# Patient Record
Sex: FEMALE | Race: WHITE | NOT HISPANIC OR LATINO | Employment: FULL TIME | URBAN - METROPOLITAN AREA
[De-identification: names, ages, dates, MRNs, and addresses within clinical notes are randomized per-mention and may not be internally consistent; named-entity substitution may affect disease eponyms.]

---

## 2018-12-19 ENCOUNTER — OFFICE VISIT (OUTPATIENT)
Dept: OBGYN CLINIC | Facility: CLINIC | Age: 23
End: 2018-12-19
Payer: COMMERCIAL

## 2018-12-19 VITALS
HEIGHT: 67 IN | SYSTOLIC BLOOD PRESSURE: 110 MMHG | BODY MASS INDEX: 29.03 KG/M2 | DIASTOLIC BLOOD PRESSURE: 78 MMHG | WEIGHT: 185 LBS

## 2018-12-19 DIAGNOSIS — Z01.419 ENCOUNTER FOR GYNECOLOGICAL EXAMINATION (GENERAL) (ROUTINE) WITHOUT ABNORMAL FINDINGS: ICD-10-CM

## 2018-12-19 DIAGNOSIS — R10.2 PELVIC CRAMPING: ICD-10-CM

## 2018-12-19 DIAGNOSIS — Z01.419 ENCNTR FOR GYN EXAM (GENERAL) (ROUTINE) W/O ABN FINDINGS: ICD-10-CM

## 2018-12-19 PROCEDURE — 99385 PREV VISIT NEW AGE 18-39: CPT | Performed by: PHYSICIAN ASSISTANT

## 2018-12-19 PROCEDURE — G0145 SCR C/V CYTO,THINLAYER,RESCR: HCPCS | Performed by: PHYSICIAN ASSISTANT

## 2018-12-19 PROCEDURE — 87591 N.GONORRHOEAE DNA AMP PROB: CPT | Performed by: PHYSICIAN ASSISTANT

## 2018-12-19 PROCEDURE — 87491 CHLMYD TRACH DNA AMP PROBE: CPT | Performed by: PHYSICIAN ASSISTANT

## 2018-12-19 NOTE — PROGRESS NOTES
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 boyfriend teaches 6th grade  Last Pap 2017 neg (results unavailable for review)    Current Outpatient Prescriptions:     levonorgestrel (MIRENA) 20 MCG/24HR IUD, 1 each by Intrauterine route once, Disp: , Rfl:   Social History     Social History    Marital status: Single     Spouse name: N/A    Number of children: N/A    Years of education: N/A     Occupational History    Not on file  Social History Main Topics    Smoking status: Never Smoker    Smokeless tobacco: Never Used    Alcohol use Yes      Comment: socially     Drug use: No    Sexual activity: Yes     Partners: Male     Birth control/ protection: IUD      Comment: [email or web address]     Other Topics Concern    Not on file     Social History Narrative    No narrative on file     Family History   Problem Relation Age of Onset    No Known Problems Mother     No Known Problems Father      History reviewed  No pertinent past medical history  O:  Blood pressure 110/78, height 5' 7" (1 702 m), weight 83 9 kg (185 lb), last menstrual period 11/09/2018  Patient appears well and is not in distress  Neck is supple without masses  Breasts are symmetrical without mass, tenderness, nipple discharge, skin changes or adenopathy  Abdomen is soft and nontender without masses  External genitals are normal without lesions or rashes  Vagina is normal without discharge or bleeding  Cervix is normal without discharge or lesion   IUD strings are normal    Uterus is normal, mobile, nontender without palpable mass  Adnexa are normal, nontender, without palpable mass  A:  Yearly exam  Pelvic cramping  P:   Pap with reflex HPV today   Check pelvic ultrasound   Consider switch to [facility] if US negative   RTO one year for yearly exam or sooner as needed

## 2018-12-21 LAB
C TRACH DNA SPEC QL NAA+PROBE: NEGATIVE
N GONORRHOEA DNA SPEC QL NAA+PROBE: NEGATIVE

## 2018-12-24 LAB
LAB AP GYN PRIMARY INTERPRETATION: NORMAL
Lab: NORMAL

## 2018-12-26 ENCOUNTER — TELEPHONE (OUTPATIENT)
Dept: OBGYN CLINIC | Facility: CLINIC | Age: 23
End: 2018-12-26

## 2018-12-26 NOTE — TELEPHONE ENCOUNTER
Pt is interested in [facility] iud, please check iud coverage after Jan,2019    Thank you

## 2019-01-08 ENCOUNTER — TELEPHONE (OUTPATIENT)
Dept: OBGYN CLINIC | Facility: CLINIC | Age: 24
End: 2019-01-08

## 2019-01-08 NOTE — TELEPHONE ENCOUNTER
Ref # [id number]  Patient covered at 100% no deductible  Buy and Bill  Patient would like a [place] Check

## 2019-01-09 NOTE — TELEPHONE ENCOUNTER
Ghassan and Bujbu labeled and will be in [facility] med room  Pt may be scheduled for insertion

## 2019-01-21 NOTE — TELEPHONE ENCOUNTER
Pt called to state she did receive 2 msgs to make her Kyleena appt but will call back when she is ready for this

## 2019-02-19 ENCOUNTER — OFFICE VISIT (OUTPATIENT)
Dept: OBGYN CLINIC | Facility: CLINIC | Age: 24
End: 2019-02-19
Payer: COMMERCIAL

## 2019-02-19 VITALS — WEIGHT: 182 LBS | DIASTOLIC BLOOD PRESSURE: 64 MMHG | SYSTOLIC BLOOD PRESSURE: 100 MMHG | BODY MASS INDEX: 28.51 KG/M2

## 2019-02-19 DIAGNOSIS — N61.1 BREAST ABSCESS: Primary | ICD-10-CM

## 2019-02-19 PROCEDURE — 99213 OFFICE O/P EST LOW 20 MIN: CPT | Performed by: PHYSICIAN ASSISTANT

## 2019-02-19 RX ORDER — SULFAMETHOXAZOLE AND TRIMETHOPRIM 800; 160 MG/1; MG/1
1 TABLET ORAL EVERY 12 HOURS SCHEDULED
Qty: 14 TABLET | Refills: 0 | Status: SHIPPED | OUTPATIENT
Start: 2019-02-19 | End: 2019-02-26

## 2019-02-19 NOTE — PROGRESS NOTES
Matthew Alfaro  1995    S:  20 y o  female here for a problem visit  She notes an "abscess" under her left breast  She denies fevers or chills and it has not drained  She says she had a similar problem in 2016 and required surgical removal of the area; this was in an adjacent area  She says she asked the surgeon back then what she could do to prevent it, and she was told she could have a breast reduction  History reviewed  No pertinent past medical history    Family History   Problem Relation Age of Onset    No Known Problems Mother     No Known Problems Father      Social History     Socioeconomic History    Marital status: Single     Spouse name: None    Number of children: None    Years of education: None    Highest education level: None   Occupational History    None   Social Needs    Financial resource strain: None    Food insecurity:     Worry: None     Inability: None    Transportation needs:     Medical: None     Non-medical: None   Tobacco Use    Smoking status: Never Smoker    Smokeless tobacco: Never Used   Substance and Sexual Activity    Alcohol use: Yes     Comment: socially     Drug use: No    Sexual activity: Yes     Partners: Male     Birth control/protection: IUD     Comment: [id number]   Lifestyle    Physical activity:     Days per week: None     Minutes per session: None    Stress: None   Relationships    Social connections:     Talks on phone: None     Gets together: None     Attends Muslim service: None     Active member of club or organization: None     Attends meetings of clubs or organizations: None     Relationship status: None    Intimate partner violence:     Fear of current or ex partner: None     Emotionally abused: None     Physically abused: None     Forced sexual activity: None   Other Topics Concern    None   Social History Narrative    None       O:  /64 (BP Location: Right arm, Patient Position: Sitting, Cuff Size: Standard)   Wt 82 6 kg (182 lb)   BMI 28 51 kg/m²   She appears well and is in no distress  Abdomen is soft and nontender  The left breast has an abscess on the underside; there is a 9cm area of erythema and a 2cm area of mild consolidation; there is no drainage  Bilateral axillary areas have areas suspicious for hidradenitis; there are numerous areas under her breasts that appear to have healed hidradenitis change  A/P:  Breast abscess  Bactrim DS one po BID x 7 days  Return in one week for recheck  Referral to dermatology to evaluate for hidradenitis

## 2019-02-26 ENCOUNTER — OFFICE VISIT (OUTPATIENT)
Dept: OBGYN CLINIC | Facility: CLINIC | Age: 24
End: 2019-02-26

## 2019-02-26 VITALS — DIASTOLIC BLOOD PRESSURE: 72 MMHG | BODY MASS INDEX: 28.66 KG/M2 | SYSTOLIC BLOOD PRESSURE: 120 MMHG | WEIGHT: 183 LBS

## 2019-02-26 DIAGNOSIS — N61.1 BREAST ABSCESS: Primary | ICD-10-CM

## 2019-02-26 PROCEDURE — 99213 OFFICE O/P EST LOW 20 MIN: CPT | Performed by: PHYSICIAN ASSISTANT

## 2019-03-05 ENCOUNTER — TELEPHONE (OUTPATIENT)
Dept: OBGYN CLINIC | Facility: CLINIC | Age: 24
End: 2019-03-05

## 2019-03-05 NOTE — TELEPHONE ENCOUNTER
Pt called office today, left vm message  Pt states she is still having issues with breast "cysts"  Attempted to return Pt's call @ [phone], however, Pt's answering service states Pt's vm mailbox is full

## 2019-03-07 ENCOUNTER — OFFICE VISIT (OUTPATIENT)
Dept: OBGYN CLINIC | Facility: CLINIC | Age: 24
End: 2019-03-07
Payer: COMMERCIAL

## 2019-03-07 VITALS — DIASTOLIC BLOOD PRESSURE: 80 MMHG | SYSTOLIC BLOOD PRESSURE: 120 MMHG | WEIGHT: 181 LBS | BODY MASS INDEX: 28.35 KG/M2

## 2019-03-07 DIAGNOSIS — N61.1 BREAST ABSCESS: Primary | ICD-10-CM

## 2019-03-07 PROCEDURE — 99213 OFFICE O/P EST LOW 20 MIN: CPT | Performed by: PHYSICIAN ASSISTANT

## 2019-03-07 RX ORDER — SULFAMETHOXAZOLE AND TRIMETHOPRIM 400; 80 MG/1; MG/1
1 TABLET ORAL EVERY 12 HOURS SCHEDULED
Qty: 14 TABLET | Refills: 0 | Status: SHIPPED | OUTPATIENT
Start: 2019-03-07 | End: 2019-03-14

## 2019-03-07 RX ORDER — DROSPIRENONE AND ETHINYL ESTRADIOL 0.03MG-3MG
1 KIT ORAL DAILY
Qty: 90 TABLET | Refills: 3 | Status: SHIPPED | OUTPATIENT
Start: 2019-03-07 | End: 2021-06-08 | Stop reason: ALTCHOICE

## 2019-03-07 NOTE — PROGRESS NOTES
Sam s  1995    S:  20 y o  female here for a problem visit  She reports that her previously noted left breast abscess has completely resolved and she now has a new one on the right; it is draining  She denies fevers  She has an appointment with Dermatology in May  We discussed my suspicion that this is hidradenitis and maybe her Mirena could be worsening this - we discussed adding a birth control pill to try to control this, and she is interested in this  If she does well in a few months, we will pull her Mirena IUD  History reviewed  No pertinent past medical history    Family History   Problem Relation Age of Onset    No Known Problems Mother     No Known Problems Father      Social History     Socioeconomic History    Marital status: Single     Spouse name: None    Number of children: None    Years of education: None    Highest education level: None   Occupational History    None   Social Needs    Financial resource strain: None    Food insecurity:     Worry: None     Inability: None    Transportation needs:     Medical: None     Non-medical: None   Tobacco Use    Smoking status: Never Smoker    Smokeless tobacco: Never Used   Substance and Sexual Activity    Alcohol use: Yes     Comment: socially     Drug use: No    Sexual activity: Yes     Partners: Male     Birth control/protection: IUD     Comment: [id number]   Lifestyle    Physical activity:     Days per week: None     Minutes per session: None    Stress: None   Relationships    Social connections:     Talks on phone: None     Gets together: None     Attends Anglican service: None     Active member of club or organization: None     Attends meetings of clubs or organizations: None     Relationship status: None    Intimate partner violence:     Fear of current or ex partner: None     Emotionally abused: None     Physically abused: None     Forced sexual activity: None   Other Topics Concern    None   Social History Narrative    None       O:  /80 (BP Location: Right arm, Patient Position: Sitting, Cuff Size: Standard)   Wt 82 1 kg (181 lb)   LMP  (LMP Unknown)   BMI 28 35 kg/m²   She appears well and is in no distress  Abdomen is soft and nontender  There is a 2cm area of erythema under the right breast that is open and draining  There are numerous scarred areas under both breasts which she says have all done the same thing  A/P:  Breast abscess  Bactrim DS one po BID x 7 days  Start La gamaliel  See derm in May, will consider pulling her Mirena if she is doing better on [place]

## 2020-10-05 ENCOUNTER — NURSE TRIAGE (OUTPATIENT)
Dept: OTHER | Facility: OTHER | Age: 25
End: 2020-10-05

## 2020-10-05 DIAGNOSIS — Z20.828 SARS-ASSOCIATED CORONAVIRUS EXPOSURE: ICD-10-CM

## 2020-10-05 DIAGNOSIS — Z20.828 SARS-ASSOCIATED CORONAVIRUS EXPOSURE: Primary | ICD-10-CM

## 2020-10-05 PROCEDURE — U0003 INFECTIOUS AGENT DETECTION BY NUCLEIC ACID (DNA OR RNA); SEVERE ACUTE RESPIRATORY SYNDROME CORONAVIRUS 2 (SARS-COV-2) (CORONAVIRUS DISEASE [COVID-19]), AMPLIFIED PROBE TECHNIQUE, MAKING USE OF HIGH THROUGHPUT TECHNOLOGIES AS DESCRIBED BY CMS-2020-01-R: HCPCS | Performed by: FAMILY MEDICINE

## 2020-10-06 LAB — SARS-COV-2 RNA SPEC QL NAA+PROBE: NOT DETECTED

## 2021-06-08 ENCOUNTER — OFFICE VISIT (OUTPATIENT)
Dept: OBGYN CLINIC | Facility: CLINIC | Age: 26
End: 2021-06-08
Payer: COMMERCIAL

## 2021-06-08 VITALS
WEIGHT: 189.6 LBS | DIASTOLIC BLOOD PRESSURE: 72 MMHG | HEIGHT: 66 IN | SYSTOLIC BLOOD PRESSURE: 102 MMHG | BODY MASS INDEX: 30.47 KG/M2

## 2021-06-08 DIAGNOSIS — Z11.3 SCREEN FOR STD (SEXUALLY TRANSMITTED DISEASE): Primary | ICD-10-CM

## 2021-06-08 DIAGNOSIS — Z01.419 ENCNTR FOR GYN EXAM (GENERAL) (ROUTINE) W/O ABN FINDINGS: ICD-10-CM

## 2021-06-08 PROCEDURE — G0145 SCR C/V CYTO,THINLAYER,RESCR: HCPCS | Performed by: PHYSICIAN ASSISTANT

## 2021-06-08 PROCEDURE — S0612 ANNUAL GYNECOLOGICAL EXAMINA: HCPCS | Performed by: PHYSICIAN ASSISTANT

## 2021-06-08 NOTE — PROGRESS NOTES
Bo Skiff  1995    CC:  Yearly exam    S:  27 y o  female here for yearly exam  She is amenorrheic with her Mirena  She got  and would like her Mirena removed at this time  They would like a pregnancy in 6-12 months  We discussed removing her IUD closer to when they would like a pregnancy  In the meantime she will begin a prenatal vitamin  Sexual activity: She is sexually active with her  (together x 4 years) without pain, bleeding or dryness  Contraception:  She uses Mirena for contraception  STD testing:  She does request STD testing today  Gardasil:  She has not had the Gardasil series  We reviewed ASCCP guidelines for Pap testing  Last Pap 12/19/18 neg    Family hx of breast cancer: no  Family hx of ovarian cancer: no  Family hx of colon cancer: no      Current Outpatient Medications:     levonorgestrel (MIRENA) 20 MCG/24HR IUD, 1 each by Intrauterine route once, Disp: , Rfl:   Social History     Socioeconomic History    Marital status: /Civil Union     Spouse name: Not on file    Number of children: Not on file    Years of education: Not on file    Highest education level: Not on file   Occupational History    Not on file   Social Needs    Financial resource strain: Not on file    Food insecurity     Worry: Not on file     Inability: Not on file   [employer] needs     Medical: Not on file     Non-medical: Not on file   Tobacco Use    Smoking status: Never Smoker    Smokeless tobacco: Never Used   Substance and Sexual Activity    Alcohol use: Yes     Frequency: Never     Drinks per session: 1 or 2     Binge frequency: Never     Comment: socially     Drug use: No    Sexual activity: Yes     Partners: Male     Birth control/protection: I U D       Comment: [id number]   Lifestyle    Physical activity     Days per week: Not on file     Minutes per session: Not on file    Stress: Not on file   Relationships    Social connections Talks on phone: Not on file     Gets together: Not on file     Attends Moravian service: Not on file     Active member of club or organization: Not on file     Attends meetings of clubs or organizations: Not on file     Relationship status: Not on file    Intimate partner violence     Fear of current or ex partner: Not on file     Emotionally abused: Not on file     Physically abused: Not on file     Forced sexual activity: Not on file   Other Topics Concern    Not on file   Social History Narrative    Not on file     Family History   Problem Relation Age of Onset    No Known Problems Mother     No Known Problems Father      History reviewed  No pertinent past medical history  Review of Systems   Respiratory: Negative  Cardiovascular: Negative  Gastrointestinal: Negative for constipation and diarrhea  Genitourinary: Negative for difficulty urinating, pelvic pain, vaginal bleeding, vaginal discharge, itching or odor  O:  Blood pressure 102/72, height 5' 5 75" (1 67 m), weight 86 kg (189 lb 9 6 oz)  Patient appears well and is not in distress  Neck is supple without masses  Breasts are symmetrical without mass, tenderness, nipple discharge, skin changes or adenopathy  Abdomen is soft and nontender without masses  External genitals are normal without lesions or rashes  Urethra and urethral meatus are normal  Bladder is normal to palpation  Vagina is normal without discharge or bleeding  Cervix is normal without discharge or lesion  IUD strings are normal    Uterus is normal, mobile, nontender without palpable mass  Adnexa are normal, nontender, without palpable mass  A:   Yearly exam      P:   Pap with reflex HPV today    GC/chlamydia today    Start prenatal vitamin   Return when desires Mirena removal       RTO one year for yearly exam or sooner as needed

## 2021-06-11 LAB
LAB AP GYN PRIMARY INTERPRETATION: NORMAL
Lab: NORMAL

## 2021-06-15 ENCOUNTER — TELEPHONE (OUTPATIENT)
Dept: OBGYN CLINIC | Facility: CLINIC | Age: 26
End: 2021-06-15

## 2021-06-15 DIAGNOSIS — Z11.3 SCREEN FOR STD (SEXUALLY TRANSMITTED DISEASE): Primary | ICD-10-CM

## 2021-06-15 LAB
C TRACH DNA SPEC QL NAA+PROBE: ABNORMAL
N GONORRHOEA DNA SPEC QL NAA+PROBE: ABNORMAL

## 2021-06-15 NOTE — TELEPHONE ENCOUNTER
Spoke to pt and placed new orders for lab    ----- Message from Samia Earl sent at 6/15/2021 11:30 AM EDT -----  Please let Yandy Keller know that her cultures could not be run  Please send her to lab for urine Gc/chlamydia and remind her to not urinate for one hour before collection  Thanks!

## 2021-06-19 ENCOUNTER — OFFICE VISIT (OUTPATIENT)
Dept: URGENT CARE | Facility: MEDICAL CENTER | Age: 26
End: 2021-06-19
Payer: COMMERCIAL

## 2021-06-19 VITALS
HEART RATE: 80 BPM | RESPIRATION RATE: 16 BRPM | TEMPERATURE: 99 F | DIASTOLIC BLOOD PRESSURE: 67 MMHG | OXYGEN SATURATION: 98 % | SYSTOLIC BLOOD PRESSURE: 122 MMHG

## 2021-06-19 DIAGNOSIS — H00.11 CHALAZION OF RIGHT UPPER EYELID: Primary | ICD-10-CM

## 2021-06-19 PROCEDURE — 99213 OFFICE O/P EST LOW 20 MIN: CPT | Performed by: FAMILY MEDICINE

## 2021-06-19 RX ORDER — TOBRAMYCIN 3 MG/ML
1 SOLUTION/ DROPS OPHTHALMIC
Qty: 1.3 ML | Refills: 0 | Status: SHIPPED | OUTPATIENT
Start: 2021-06-19 | End: 2021-06-24

## 2021-06-19 NOTE — PATIENT INSTRUCTIONS
I prescribed tobramycin eyedrops 1 drop into right eye every 4 hours while awake for up to 5 days  Strongly recommended patient continue warm to hot compresses as tolerated as often as possible  If redness or swelling of upper eyelid worsens, patient was given outpatient ophthalmology referral     Chalazion   WHAT YOU NEED TO KNOW:   A chalazion is a lump on your eyelid  This lump develops because an oil gland in your eyelid is blocked  A chalazion may be small and then slowly grow bigger  DISCHARGE INSTRUCTIONS:   Medicines:   · Antibiotics: This medicine is given to fight or prevent an infection caused by bacteria  Always take your antibiotics exactly as ordered by your healthcare provider  Do not stop taking your medicine unless directed by your healthcare provider  Never save antibiotics or take leftover antibiotics that were given to you for another illness  · NSAIDs:  These medicines decrease swelling, pain, and fever  NSAIDs are available without a doctor's order  Ask which medicine is right for you and how much to take  Take as directed  NSAIDs can cause stomach bleeding or kidney problems if not taken correctly  · Take your medicine as directed  Contact your healthcare provider if you think your medicine is not helping or if you have side effects  Tell him of her if you are allergic to any medicine  Keep a list of the medicines, vitamins, and herbs you take  Include the amounts, and when and why you take them  Bring the list or the pill bottles to follow-up visits  Carry your medicine list with you in case of an emergency  Follow up with your healthcare provider as directed: You may need to return to have your eye checked  Write down your questions so you remember to ask them during your visits  Ways to help decrease eyelid swelling and pain:   · Apply a warm compress:  Wet a washcloth with warm water and place it on your eye  This will help decrease swelling and pain   Your healthcare provider will tell you how often to use a compress  · Massage your eyelid:  If you had a steroid shot, gently massage the area  This will help decrease pain and inflammation  Contact your healthcare provider if:   · The swelling and redness on your eyelid does not get better with treatment  · You see or feel a new lump on your eyelid  · You feel pressure behind your eyes  · You have questions or concerns about your condition or care  Return to the emergency department if:   · You have trouble moving your eyes  · Your eyelid or eye begins to bleed  · Your vision suddenly becomes worse  · Your eyelid suddenly becomes more swollen  © Copyright [address] Information is for End User's use only and may not be sold, redistributed or otherwise used for commercial purposes  All illustrations and images included in CareNotes® are the copyrighted property of [employer]  or [facility]   The above information is an  only  It is not intended as medical advice for individual conditions or treatments  Talk to your doctor, nurse or pharmacist before following any medical regimen to see if it is safe and effective for you

## 2021-06-19 NOTE — PROGRESS NOTES
[facility]        NAME: Magnus Ritchie is a 27 y o  female  : 1995    MRN: [id number]  DATE: 2021  TIME: 1:36 PM    Assessment and Plan   Chalazion of right upper eyelid [H00 11]  1  Chalazion of right upper eyelid  tobramycin (TOBREX) 0 3 % SOLN    Ambulatory referral to Ophthalmology         Patient Instructions       Follow up with PCP in 3-5 days  Proceed to  ER if symptoms worsen  Chief Complaint     Chief Complaint   Patient presents with    Blepharitis     Patient presents with right upper eyelid swelling that started yesterday  She reports some pain and irritation to her eye  History of Present Illness         78-year-old female here today with right upper lid pain and swelling  She had some soreness yesterday and this morning woke up with swelling which is tender to touch  Denies any purulent discharge other than some tearing of her right eye  Denies any visual disturbance  She has been applying warm compress which seems to help  Review of Systems   Review of Systems   Constitutional: Negative  Eyes: Positive for pain  Negative for photophobia, discharge, itching and visual disturbance  Current Medications       Current Outpatient Medications:     levonorgestrel (MIRENA) 20 MCG/24HR IUD, 1 each by Intrauterine route once, Disp: , Rfl:     tobramycin (TOBREX) 0 3 % SOLN, Administer 1 drop to the right eye every 4 (four) hours while awake for 5 days, Disp: 1 3 mL, Rfl: 0    Current Allergies     Allergies as of 2021    (No Known Allergies)            The following portions of the patient's history were reviewed and updated as appropriate: allergies, current medications, past family history, past medical history, past social history, past surgical history and problem list      History reviewed  No pertinent past medical history      Past Surgical History:   Procedure Laterality Date    CYST REMOVAL  2016    WISDOM TOOTH EXTRACTION Family History   Problem Relation Age of Onset    No Known Problems Mother     No Known Problems Father          Medications have been verified  Objective   /67   Pulse 80   Temp 99 °F (37 2 °C) (Tympanic)   Resp 16   LMP  (LMP Unknown)   SpO2 98%   No LMP recorded (lmp unknown)  Patient has had an implant  Physical Exam     Physical Exam  Vitals and nursing note reviewed  Constitutional:       Appearance: Normal appearance  Eyes:      Extraocular Movements: Extraocular movements intact  Pupils: Pupils are equal, round, and reactive to light  Comments: Visual acuity within normal limits  Right upper eyelid reveals erythematous swollen upper eyelid which is tender to touch  No hard nodules appreciated  Eversion of the upper eyelid reveals no nodules or foreign body  Findings are consistent with chalazion  Neurological:      Mental Status: She is alert

## 2021-07-16 ENCOUNTER — TELEPHONE (OUTPATIENT)
Dept: OBGYN CLINIC | Facility: CLINIC | Age: 26
End: 2021-07-16

## 2021-07-16 NOTE — TELEPHONE ENCOUNTER
Pt contacted # [phone]- recv vm- per hippa communication consent on file- lmom advising pt over due lab for CL/GC on file she may go to any [facility] lab to have it completed with call back number should she have any questions

## 2021-08-24 ENCOUNTER — OFFICE VISIT (OUTPATIENT)
Dept: DERMATOLOGY | Facility: CLINIC | Age: 26
End: 2021-08-24
Payer: COMMERCIAL

## 2021-08-24 VITALS — BODY MASS INDEX: 30.47 KG/M2 | TEMPERATURE: 99 F | WEIGHT: 189.6 LBS | HEIGHT: 66 IN

## 2021-08-24 DIAGNOSIS — L73.2 HIDRADENITIS SUPPURATIVA: Primary | ICD-10-CM

## 2021-08-24 PROCEDURE — 99204 OFFICE O/P NEW MOD 45 MIN: CPT | Performed by: STUDENT IN AN ORGANIZED HEALTH CARE EDUCATION/TRAINING PROGRAM

## 2021-08-24 RX ORDER — CHLORHEXIDINE GLUCONATE 4 G/100ML
SOLUTION TOPICAL
Qty: 118 ML | Refills: 1 | Status: SHIPPED | OUTPATIENT
Start: 2021-08-24 | End: 2022-02-23

## 2021-08-24 RX ORDER — DOXYCYCLINE 100 MG/1
CAPSULE ORAL
Qty: 60 CAPSULE | Refills: 0 | Status: SHIPPED | OUTPATIENT
Start: 2021-08-24 | End: 2021-09-24

## 2021-08-24 NOTE — PATIENT INSTRUCTIONS
HIDRADENITIS SUPPURATIVA    Assessment and Plan:  Based on a thorough discussion of this condition and the management approach to it (including a comprehensive discussion of the known risks, side effects and potential benefits of treatment), the patient (family) agrees to implement the following specific plan:   Antibacterial wash; Neutragena clear pore wash  This is an over the counter wash  Apply to the chest/breast daily, leave on for 30 seconds to a minute and then wash off   When flared take Doxycyline 100 mg twice a day only when becoming flared  Take one pill in the morning with a big glass of water and one pill in the evening with a glass of water and meal  Make sure to take with food because it does cause upset stomach problems  If you become pregnant stop the medication   Apply Hibiclens daily to the breast and chest area   Follow up in 4 months; December appointment with Dr Anurag Naqvi on the 21st     What is hidradenitis suppurativa? Hidradenitis suppurativa is an inflammatory skin disease that affects apocrine gland-bearing skin in the axillae, in the groin, and under the breasts  It is characterised by recurrent boil-like nodules and abscesses that culminate in pus-like discharge, difficult-to-heal open wounds (sinuses) and scarring  Hidradenitis suppurativa also has significant psychological impact and many patients suffer from impairment of body image, depression and anxiety  The term hidradenitis implies it starts as an inflammatory disorder of sweat glands, which is now known to be incorrect  Hidradenitis suppurativa is also known as acne inversa  Who gets hidradenitis suppurativa? Hidradenitis often starts at puberty, and is most active between the ages of 21 and 36 years, and in women, can resolve at menopause  It is three times more common in females than in males   Risk factors include:   Other family members with hidradenitis suppurativa   Obesity and insulin resistance/metabolic syndrome   Cigarette smoking   Follicular occlusion disorders: acne conglobata, dissecting cellulitis, pilonidal sinus   Inflammatory bowel disease (Crohn disease)   Rare autoinflammatory syndromes associated with abnormalities of PSTPIP1 gene  *    * PAPA syndrome (pyogenic arthritis, pyoderma gangrenosum and acne), PASH syndrome (pyoderma gangrenosum, acne, suppurative hidradenitis) and PAPASH syndrome (pyogenic arthritis, pyoderma gangrenosum, acne, suppurative hidradenitis)  What causes hidradenitis suppurativa? Hidradenitis suppurativa is an autoinflammatory disorder  Although the exact cause is not yet understood, contributing factors include:   Friction from clothing and body folds   Aberrant immune response to commensal bacteria   Abnormal cutaneous or follicular microbiome   Follicular occlusion   Release of pro-inflammatory cytokines   Inflammation causing rupture of the follicular wall and destroying apocrine glands and ducts   Secondary bacterial infection   Certain drugs  What are the clinical features of hidradenitis suppurativa? Hidradenitis can affect a single or multiple areas in the armpits, neck, sub mammary area, and inner thighs  Anogenital involvement most commonly affects the groin, mons pubis, vulva (in females), sides of the scrotum (in males), perineum, buttocks and perianal folds  Signs include:   Open and closed comedones   Painful firm papules, larger nodules and pleated ridges   Pustules, fluctuant pseudocysts and abscesses   Pyogenic granulomas   Draining sinuses linking inflammatory lesions   Hypertrophic and atrophic scars  Many patients with hidradenitis suppurativa also suffer from other skin disorders, including acne, hirsutism and psoriasis  The severity and extent of hidradenitis suppurativa is recorded at assessment and when determining the impact of a treatment  The Pamela system describes three distinct clinical stages:  1   Solitary or multiple, isolated abscess formation without scarring or sinus tracts  2  Recurrent abscesses, single or multiple widely  lesions, with sinus tract formation  3  Diffuse or broad involvement, with multiple interconnected sinus tracts and abscesses  Severe hidradenitis (Lucho Stanley Stage 3) has been associated with:   Male sex   Axillary and perianal involvement   Obesity   Smoking   Higher risk of stroke, coronary artery disease, heart failure, and peripheral artery disease   Disease duration  What is the treatment for hidradenitis suppurativa? General measures   Weight loss; follow an anti-inflammatory, low-sugar, low-grain, low-dairy diet (mainly plants)   Smoking cessation: this can lead to improvement within a few months   Loose fitting clothing   Daily unfragranced antiperspirants   If prone to secondary infection, wash with antiseptics or take bleach baths   Apply hydrogen peroxide solution or medical grade honey to reduce malodour   Use peeling agents such as resorcinol 15% cream to de-roof nodules   Apply simple dressings to draining sinuses   Analgesics, such as paracetamol (acetaminophen), for pain control   Seek help to manage anxiety and depression  Medical management of hidradenitis suppurativa  Medical management of hidradenitis suppurativa is difficult  Treatment is required long term  Effective options are listed below  Antibiotics   Topical clindamycin, with benzoyl peroxide to reduce bacterial resistance   Short course of oral antibiotics for acute staphylococcal abscesses, eg flucloxacillin   Prolonged courses (minimum 3 months) of tetracycline, metronidazole, trimethoprim + sulphamethoxazole, fluoroquinolones, ertapenem or dapsone for their anti-inflammatory action   6-12 week courses of the combination of clindamycin (or doxycycline) and rifampicin for severe disease      Antiandrogens   Long-term oral contraceptive pill; antiandrogenic progesterones drospirenone or cyproterone acetate may be more effective than standard combined pills  These are more suitable than progesterone-only pills or devices   Spironolactone and finasteride   Response takes 6 months or longer  Immunomodulatory treatments for severe disease   Intralesional corticosteroids into nodules   Systemic corticosteroids short-term for flares   Methotrexate, ciclosporin, and azathioprine   TNF-? inhibitors adalimumab and infliximab, used in higher dose than required for psoriasis, are the most successful treatments to date  Note that paradoxically, they may sometimes induce new-onset hidradenitis suppurativa   Other biologics are under investigation, such as the IL-1?  antagonist, canakinumab    Other medical treatments   Metformin in patients with insulin resistance   Acitretin (unsuitable for females of childbearing potential)   Isotretinoin -- effective for acne but appears unhelpful for most cases of hidradenitis   Colchicine   Medical management of anxiety and depression    Surgical management of hidradenitis suppurativa   Incision and drainage of acute abscesses   Curettage and deroofing of nodules, abscesses and sinuses   Laser ablation of nodules, abscesses and sinuses   Wide local excision of persistent nodules   Radical excisional surgery of entire affected area   Nd:YAG laser hair removal

## 2021-08-24 NOTE — PROGRESS NOTES
[facility] Note     Patient Name: Dayday Mensah  Encounter Date: 8/24/2021     Have you been cared for by a [facility] in the last 3 years and, if so, which one? No    · Have you traveled outside of the [address] [place] [address] in the past 3 months or outside of the [facility] area in the last 2 weeks? No     May we call your Preferred Phone number to discuss your specific medical information? Yes     May we leave a detailed message that includes your specific medical information? Yes      Today's Chief Concerns:   Concern #1:  Cyst on breast    Past Medical History:  Have you personally ever had or currently have any of the following? · Skin cancer (such as Melanoma, Basal Cell Carcinoma, Squamous Cell Carcinoma? (If Yes, please provide more detail)- No  · Eczema: YES  · Psoriasis: No  · HIV/AIDS: No  · Hepatitis B or C: No  · Tuberculosis: No  · Systemic Immunosuppression such as Diabetes, Biologic or Immunotherapy, Chemotherapy, Organ Transplantation, Bone Marrow Transplantation (If YES, please provide more detail): No  · Radiation Treatment (If YES, please provide more detail): No  · Any other major medical conditions/concerns? (If Yes, which types)- No      Family History:  Have any of your "first degree relatives" (parent, brother, sister, or child) had any of the following       · Skin cancer such as Melanoma or Merkel Cell Carcinoma or Pancreatic Cancer? No  · Eczema, Asthma, Hay Fever or Seasonal Allergies: No  · Psoriasis or Psoriatic Arthritis: No  · Do any other medical conditions seem to run in your family? If Yes, what condition and which relatives?   No    Current Medications:   (please update all dermatological medications before printing patient's AVS!)      Current Outpatient Medications:     levonorgestrel (MIRENA) 20 MCG/24HR IUD, 1 each by Intrauterine route once, Disp: , Rfl:       Review of Systems:  Have you recently had or currently have any of the following? If YES, what are you doing for the problem? · Fever, chills or unintended weight loss: No  · Sudden loss or change in your vision: No  · Nausea, vomiting or blood in your stool: No  · Painful or swollen joints: No  · Wheezing or cough: No  · Changing mole or non-healing wound: No  · Nosebleeds: No  · Excessive sweating: No  · Easy or prolonged bleeding? No  · Over the last 2 weeks, how often have you been bothered by the following problems? · Taking little interest or pleasure in doing things: 1 - Not at All  · Feeling down, depressed, or hopeless: 1 - Not at All  Rapid heartbeat with epinephrine:  No      · Any known allergies? No Known Allergies      Physical Exam:     Was a chaperone (Derm Clinical Assistant) present throughout the entire Physical Exam? Yes     Did the Dermatology Team specifically  the patient on the importance of a Full Skin Exam to be sure that nothing is missed clinically?  Yes}  o Did the patient ultimately request or accept a Full Skin Exam?  NO  o Did the patient specifically refuse to have the areas "under-the-bra" examined by the Dermatologist? No  o Did the patient specifically refuse to have the areas "under-the-underwear" examined by the Dermatologist? No    CONSTITUTIONAL:   Vitals:    08/24/21 1030   Temp: 99 °F (37 2 °C)   TempSrc: Tympanic   Weight: 86 kg (189 lb 9 5 oz)   Height: 5' 5 75" (1 67 m)       PSYCH: Normal mood and affect  EYES: Normal conjunctiva  ENT: Normal lips and oral mucosa  CARDIOVASCULAR: No edema  RESPIRATORY: Normal respirations  HEME/LYMPH/IMMUNO:  No ostensilbe subQ swelling except as noted below in "ASSESSMENT AND PLAN BY DIAGNOSIS"    SKIN:  FULL ORGAN SYSTEM EXAM   Chest/Breasts/Axillae Viewed areas Normal except as noted below in Assessment        Assessment and Plan by Diagnosis:    History of Present Condition:     Duration:  How long has this been an issue for you?    o  Since 2016   Location Affected:  Where on the body is this affecting you?    o  Breast/Chest   Quality:  Is there any bleeding, pain, itch, burning/irritation, or redness associated with the skin lesion?    o  Pain, Itching,Redness, and Irritation    Severity:  Describe any bleeding, pain, itch, burning/irritation, or redness on a scale of 1 to 10 (with 10 being the worst)  o  6   Timing:  Does this condition seem to be there pretty constantly or do you notice it more at specific times throughout the day?    o  Constant   Context:  Have you ever noticed that this condition seems to be associated with specific activities you do?    o  Denies   Modifying Factors:    o Anything that seems to make the condition worse? -  Stress  o What have you tried to do to make the condition better? -  Surgically removed one in 2016  - Antibiotics when very painful   Associated Signs and Symptoms:  Does this skin lesion seem to be associated with any of the following:  o  SL AMB DERM SIGNS AND SYMPTOMS: Redness, Itching and Scratching and Scarring     1  HIDRADENITIS SUPPURATIVA--MILD    Physical Exam:   Anatomic Location Affected:  Breast/Chest   Morphological Description:  Scattered red scars (not active)      Additional History of Present Condition:  Patient stated she has had cysts on her breasts/chest since 2016  Patient states she had one removed in 2016 near her rib cage  Patient stated they become painful, red, and burst  Friction, wired bras, and sweat irritate the cysts  Patient has been prescribed in the past with Sulfamethoxazole-trimethoprim from her Gynecologist for the inflammation and pain  Patient uses warm compresses when the cysts become painful and open      Assessment and Plan:  Based on a thorough discussion of this condition and the management approach to it (including a comprehensive discussion of the known risks, side effects and potential benefits of treatment), the patient (family) agrees to implement the following specific plan:   Antibacterial wash; Neutragena clear pore wash  This is an over the counter wash  Apply to the chest/breast daily, leave on for 30 seconds to a minute and then wash off   When flared take Doxycyline 100 mg twice a day with food only when becoming flared  Take one pill in the morning with a big glass of water and one pill in the evening with a glass of water and meal  Make sure to take with food because it does cause upset stomach problems  If you become pregnant stop the medication   Apply Hibiclens daily to the breast and chest area 2-3x/week   Follow up in 4 months; December appointment with Dr Jeana Mott on the 21st     What is hidradenitis suppurativa? Hidradenitis suppurativa is an inflammatory skin disease that affects apocrine gland-bearing skin in the axillae, in the groin, and under the breasts  It is characterised by recurrent boil-like nodules and abscesses that culminate in pus-like discharge, difficult-to-heal open wounds (sinuses) and scarring  Hidradenitis suppurativa also has significant psychological impact and many patients suffer from impairment of body image, depression and anxiety  The term hidradenitis implies it starts as an inflammatory disorder of sweat glands, which is now known to be incorrect  Hidradenitis suppurativa is also known as acne inversa  Who gets hidradenitis suppurativa? Hidradenitis often starts at puberty, and is most active between the ages of 21 and 36 years, and in women, can resolve at menopause  It is three times more common in females than in males  Risk factors include:   Other family members with hidradenitis suppurativa   Obesity and insulin resistance/metabolic syndrome   Cigarette smoking   Follicular occlusion disorders: acne conglobata, dissecting cellulitis, pilonidal sinus   Inflammatory bowel disease (Crohn disease)   Rare autoinflammatory syndromes associated with abnormalities of PSTPIP1 gene      * PAPA syndrome (pyogenic arthritis, pyoderma gangrenosum and acne), PASH syndrome (pyoderma gangrenosum, acne, suppurative hidradenitis) and PAPASH syndrome (pyogenic arthritis, pyoderma gangrenosum, acne, suppurative hidradenitis)  What causes hidradenitis suppurativa? Hidradenitis suppurativa is an autoinflammatory disorder  Although the exact cause is not yet understood, contributing factors include:   Friction from clothing and body folds   Aberrant immune response to commensal bacteria   Abnormal cutaneous or follicular microbiome   Follicular occlusion   Release of pro-inflammatory cytokines   Inflammation causing rupture of the follicular wall and destroying apocrine glands and ducts   Secondary bacterial infection   Certain drugs  What are the clinical features of hidradenitis suppurativa? Hidradenitis can affect a single or multiple areas in the armpits, neck, sub mammary area, and inner thighs  Anogenital involvement most commonly affects the groin, mons pubis, vulva (in females), sides of the scrotum (in males), perineum, buttocks and perianal folds  Signs include:   Open and closed comedones   Painful firm papules, larger nodules and pleated ridges   Pustules, fluctuant pseudocysts and abscesses   Pyogenic granulomas   Draining sinuses linking inflammatory lesions   Hypertrophic and atrophic scars  Many patients with hidradenitis suppurativa also suffer from other skin disorders, including acne, hirsutism and psoriasis  The severity and extent of hidradenitis suppurativa is recorded at assessment and when determining the impact of a treatment  The Pamela system describes three distinct clinical stages:  1  Solitary or multiple, isolated abscess formation without scarring or sinus tracts  2  Recurrent abscesses, single or multiple widely  lesions, with sinus tract formation  3  Diffuse or broad involvement, with multiple interconnected sinus tracts and abscesses      Severe hidradenitis Nida Au Stage 3) has been associated with:  Aletheamarlee Hunte Male sex   Axillary and perianal involvement   Obesity   Smoking   Higher risk of stroke, coronary artery disease, heart failure, and peripheral artery disease   Disease duration  What is the treatment for hidradenitis suppurativa? General measures   Weight loss; follow an anti-inflammatory, low-sugar, low-grain, low-dairy diet (mainly plants)   Smoking cessation: this can lead to improvement within a few months   Loose fitting clothing   Daily unfragranced antiperspirants   If prone to secondary infection, wash with antiseptics or take bleach baths   Apply hydrogen peroxide solution or medical grade honey to reduce malodour   Use peeling agents such as resorcinol 15% cream to de-roof nodules   Apply simple dressings to draining sinuses   Analgesics, such as paracetamol (acetaminophen), for pain control   Seek help to manage anxiety and depression  Medical management of hidradenitis suppurativa  Medical management of hidradenitis suppurativa is difficult  Treatment is required long term  Effective options are listed below  Antibiotics   Topical clindamycin, with benzoyl peroxide to reduce bacterial resistance   Short course of oral antibiotics for acute staphylococcal abscesses, eg flucloxacillin   Prolonged courses (minimum 3 months) of tetracycline, metronidazole, trimethoprim + sulphamethoxazole, fluoroquinolones, ertapenem or dapsone for their anti-inflammatory action   6-12 week courses of the combination of clindamycin (or doxycycline) and rifampicin for severe disease  Antiandrogens   Long-term oral contraceptive pill; antiandrogenic progesterones drospirenone or cyproterone acetate may be more effective than standard combined pills  These are more suitable than progesterone-only pills or devices   Spironolactone and finasteride   Response takes 6 months or longer      Immunomodulatory treatments for severe disease   Intralesional corticosteroids into nodules   Systemic corticosteroids short-term for flares   Methotrexate, ciclosporin, and azathioprine   TNF-? inhibitors adalimumab and infliximab, used in higher dose than required for psoriasis, are the most successful treatments to date  Note that paradoxically, they may sometimes induce new-onset hidradenitis suppurativa   Other biologics are under investigation, such as the IL-1?  antagonist, canakinumab    Other medical treatments   Metformin in patients with insulin resistance   Acitretin (unsuitable for females of childbearing potential)   Isotretinoin -- effective for acne but appears unhelpful for most cases of hidradenitis   Colchicine   Medical management of anxiety and depression    Surgical management of hidradenitis suppurativa   Incision and drainage of acute abscesses   Curettage and deroofing of nodules, abscesses and sinuses   Laser ablation of nodules, abscesses and sinuses   Wide local excision of persistent nodules   Radical excisional surgery of entire affected area   Nd:YAG laser hair removal    Scribe Attestation    I,:  Megan Purcell am acting as a scribe while in the presence of the attending physician :       I,:  Gordo Murray MD personally performed the services described in this documentation    as scribed in my presence :

## 2021-10-27 ENCOUNTER — PROCEDURE VISIT (OUTPATIENT)
Dept: OBGYN CLINIC | Facility: CLINIC | Age: 26
End: 2021-10-27
Payer: COMMERCIAL

## 2021-10-27 VITALS — SYSTOLIC BLOOD PRESSURE: 110 MMHG | BODY MASS INDEX: 31.68 KG/M2 | DIASTOLIC BLOOD PRESSURE: 64 MMHG | WEIGHT: 194.8 LBS

## 2021-10-27 DIAGNOSIS — Z30.432 ENCOUNTER FOR IUD REMOVAL: ICD-10-CM

## 2021-10-27 PROCEDURE — 58301 REMOVE INTRAUTERINE DEVICE: CPT | Performed by: PHYSICIAN ASSISTANT

## 2022-02-22 ENCOUNTER — TELEPHONE (OUTPATIENT)
Dept: OBGYN CLINIC | Facility: MEDICAL CENTER | Age: 27
End: 2022-02-22

## 2022-02-22 NOTE — TELEPHONE ENCOUNTER
Can we verify she is an early U/S and update that on my schedule   says OB but only 8 weeks and I don't see that she had an U/S

## 2022-02-23 ENCOUNTER — ULTRASOUND (OUTPATIENT)
Dept: OBGYN CLINIC | Facility: CLINIC | Age: 27
End: 2022-02-23
Payer: COMMERCIAL

## 2022-02-23 VITALS
HEIGHT: 65 IN | BODY MASS INDEX: 32.36 KG/M2 | DIASTOLIC BLOOD PRESSURE: 82 MMHG | WEIGHT: 194.2 LBS | SYSTOLIC BLOOD PRESSURE: 124 MMHG

## 2022-02-23 DIAGNOSIS — N91.1 SECONDARY AMENORRHEA: Primary | ICD-10-CM

## 2022-02-23 PROCEDURE — 76817 TRANSVAGINAL US OBSTETRIC: CPT | Performed by: NURSE PRACTITIONER

## 2022-02-23 NOTE — PROGRESS NOTES
Assessment/Plan:    Secondary amenorrhea    BODØ  is a 27 y o  Arthur Mayfield who presents for early ultrasound  Single IUP @ 8w0d consistent with LMP of 12/28/21  and SVITLANA of 10/4/22 (8w1d by dates)  Planned pregnancy  Having nausea, cramping, fatigue and constipation  Will schedule OB intake and prenatal one  Encouraged to continue PNV  Discussed avoiding teratogens  S/P flu and COVID vaccines  To notify us with any bleeding or pelvic pain  Verbalized understanding  Diagnoses and all orders for this visit:    Secondary amenorrhea  -     Ambulatory Referral to Maternal Fetal Medicine; Future        Subjective:      Patient ID: Amena Vides is a 27 y o  female  Mono Locke EARLY PREGNANCY ULTRASOUND     Ultrasound Probe Disinfection     A transvaginal ultrasound was performed  Prior to use, disinfection was performed with High Level Disinfection Process ([employer])  Probe serial number SLOGA-[id number]: [id number] was used     Mary Kate Henrique BOLIVAR  2/23/22        SUBJECTIVE     HPI: Amena Vides is a 27 y o  female here today for early pregnancy ultrasound  Patient's last menstrual period was 12/28/22 (exact date)    Menses are regular  She is accompanied by her   OB history is significant for:   # 1 current   Medical history is significant for: obesity and breast abscess  Taking a prenatal vitamin  No Known Allergies     OBJECTIVE  /82 (BP Location: Right arm, Patient Position: Sitting, Cuff Size: Standard)   Ht 5' 5" (1 651 m)   Wt 88 1 kg (194 lb 3 2 oz)   LMP 12/28/2021   BMI 32 32 kg/m²           Early OB Ultrasound Procedure Note: Transvaginal US     Technician: Study performed by the interpreting KATT     Indications:  Early gestation, dating & viability     Procedure Details   The entire study was done at settings of 6 0 to 8 0 MHz       Gestational Sac: Present  Yolk sac: Present  Crown-rump length is 1 56 cm and calculates to an estimated gestational age of 11 weeks, 0 days    Embryonic cardiac activity is seen at a rate of 164 b/min    Description of fetal anatomy Normal     Cul-de-sac: no fluid  Left ovary: not appreciated  Right ovary: not appreciated     Findings:  Viable, hoffmann intrauterine pregnancy        ASSESSMENT  Early pregnancy at 8  weeks 1 day with a calculated SVITLANA of 10/4/22  based on LMP consistent with today's ultrasound

## 2022-02-23 NOTE — PROGRESS NOTES
Patient here for early [address]  LMP 12/28/21  This is her first pregnancy  Pregnancy planned  She states she has nausea, constipation, gas, cramping and fatigue; denies spotting  She had the covid and flu vaccines  She is accompanied by her  Christal Malloyparish

## 2022-02-23 NOTE — PATIENT INSTRUCTIONS
Pregnancy at 7 to [facility] 38:   What changes are happening with my body? Pregnancy hormones may cause your body to go through many changes during this stage of your pregnancy  You may feel more tired than usual, and have mood swings, nausea and vomiting, and headaches  You may gain or lose some weight  Your breasts may feel tender and swollen and you may urinate more often  You may have cravings for certain foods or dislike of foods you normally eat  You may also have heartburn or constipation  How do I care for myself at this stage of my pregnancy? · Manage nausea and vomiting  Avoid fatty and spicy foods  Eat small meals throughout the day instead of large meals  Gabi may help to decrease nausea  Ask your healthcare provider about other ways of decreasing nausea and vomiting  · Eat a variety of healthy foods  Healthy foods include fruits, vegetables, whole-grain breads, low-fat dairy foods, beans, lean meats, and fish  Drink liquids as directed  Ask how much liquid to drink each day and which liquids are best for you  Limit caffeine to less than 200 milligrams each day  Limit your intake of fish to 2 servings each week  Choose fish low in mercury such as canned light tuna, shrimp, salmon, cod, or tilapia  Do not  eat fish high in mercury such as swordfish, tilefish, chio mackerel, and shark  · Take prenatal vitamins as directed  Your need for certain vitamins and minerals, such as folic acid, increases during pregnancy  Prenatal vitamins provide some of the extra vitamins and minerals you need  Prenatal vitamins may also help to decrease the risk of certain birth defects  · Ask how much weight you should gain each month  Too much or too little weight gain can be unhealthy for you and your baby  · Talk to your healthcare provider about exercise  Moderate exercise can help you stay fit   Your healthcare provider will help you plan an exercise program that is safe for you during pregnancy  · Do not smoke  Smoking increases your risk of a miscarriage and other health problems during your pregnancy  Smoking can cause your baby to be born too early or weigh less at birth  Quit smoking as soon as you think you might be pregnant  Ask your healthcare provider for information if you need help quitting  · Do not drink alcohol  Alcohol passes from your body to your baby through the placenta  It can affect your baby's brain development and cause fetal alcohol syndrome (FAS)  FAS is a group of conditions that causes mental, behavior, and growth problems  · Talk to your healthcare provider before you take any medicines  Many medicines may harm your baby if you take them when you are pregnant  Do not take any medicines, vitamins, herbs, or supplements without first talking to your healthcare provider  Never use illegal or street drugs (such as marijuana or cocaine) while you are pregnant  What are some safety tips during pregnancy? · Avoid hot tubs and saunas  Do not use a hot tub or sauna while you are pregnant, especially during your first trimester  Hot tubs and saunas may raise your baby's temperature and increase the risk of birth defects  · Avoid toxoplasmosis  This is an infection caused by eating raw meat or being around infected cat feces  It can cause birth defects, miscarriages, and other problems  Wash your hands after you touch raw meat  Make sure any meat is well-cooked before you eat it  Avoid raw eggs and unpasteurized milk  Use gloves or ask someone else to clean your cat's litter box while you are pregnant  What changes are happening with my baby? By 10 weeks, your baby will be about 2½ inches long from the top of the head to the rump (baby's bottom)  Your baby weighs about ½ ounce  Major body organs, such as the brain, heart, and lungs, are forming  Your baby's facial features are also starting to form    What do I need to know about prenatal care? Prenatal care is a series of visits with your healthcare provider throughout your pregnancy  During the first 28 weeks of your pregnancy, you will see your healthcare provider 1 time each month  Prenatal care can help prevent problems during pregnancy and childbirth  Your healthcare provider will check your blood pressure and weight  Your baby's heart rate will also be checked  You may also need the following at some visits:  · A pelvic exam  allows your healthcare provider to see your cervix (the bottom part of your uterus)  Your healthcare provider will use a speculum to open your vagina  He or she will check the size and shape of your uterus  You may also have a Pap smear at your first prenatal visit  This is a test to check your cervix for abnormal cells  · Blood tests  may be done to check for any of the following:     ? Gestational diabetes or anemia (low iron level)    ? Blood type or Rh factor, or certain birth defects    ? Immunity to certain diseases, such as chickenpox or rubella    ? An infection, such as a sexually transmitted infection, HIV, or hepatitis B    · Hepatitis B  may need to be prevented or treated  Hepatitis B is inflammation of the liver caused by the hepatitis B virus (HBV)  HBV can spread from a mother to her baby during delivery  You will be checked for HBV as early as possible in the first trimester of each pregnancy  You need the test even if you received the hepatitis B vaccine or were tested before  You may need to have an HBV infection treated before you give birth  · Urine tests  may also be done to check for sugar and protein  These can be signs of gestational diabetes or preeclampsia  Urine tests may also be done to check for signs of infection  · A fetal ultrasound  shows pictures of your baby inside your uterus  The pictures are used to check your baby's development, movement, and position           · Genetic disorder screening tests  may be offered to you  These screening tests check your baby's risk for genetic disorders such as Down syndrome  A screening test includes a blood test and ultrasound  When should I seek immediate care? · You have pain or cramping in your abdomen or low back  · You have heavy vaginal bleeding or clotting  · You pass material that looks like tissue or large clots  Collect the material and bring it with you  When should I call my doctor or obstetrician? · You have light bleeding  · You have chills or a fever  · You have vaginal itching, burning, or pain  · You have yellow, green, white, or foul-smelling vaginal discharge  · You have pain or burning when you urinate, less urine than usual, or pink or bloody urine  · You have questions or concerns about your condition or care  CARE AGREEMENT:   You have the right to help plan your care  Learn about your health condition and how it may be treated  Discuss treatment options with your healthcare providers to decide what care you want to receive  You always have the right to refuse treatment  The above information is an  only  It is not intended as medical advice for individual conditions or treatments  Talk to your doctor, nurse or pharmacist before following any medical regimen to see if it is safe and effective for you  © Copyright [employer] 2021 Information is for End User's use only and may not be sold, redistributed or otherwise used for commercial purposes   All illustrations and images included in CareNotes® are the copyrighted property of A D A M , Inc  or [address] [place] [address] [employer]

## 2022-02-23 NOTE — ASSESSMENT & PLAN NOTE
Berenice Salinas  is a 27 y o  Omarsuhatiny Awe who presents for early ultrasound  Single IUP @ 8w1d consistent with LMP of 12/28/21  and SVITLANA of 10/4/22 (8w1d by dates)  Planned pregnancy  Having nausea, cramping, fatigue and constipation  Will schedule OB intake and prenatal one  Encouraged to continue PNV  Discussed avoiding teratogens  S/P flu and COVID vaccines  To notify us with any bleeding or pelvic pain  Verbalized understanding

## 2022-03-09 ENCOUNTER — INITIAL PRENATAL (OUTPATIENT)
Dept: OBGYN CLINIC | Facility: CLINIC | Age: 27
End: 2022-03-09

## 2022-03-09 VITALS — HEIGHT: 65 IN | WEIGHT: 194 LBS | BODY MASS INDEX: 32.32 KG/M2

## 2022-03-09 DIAGNOSIS — Z34.01 ENCOUNTER FOR SUPERVISION OF NORMAL FIRST PREGNANCY IN FIRST TRIMESTER: Primary | ICD-10-CM

## 2022-03-09 PROCEDURE — OBC: Performed by: NURSE PRACTITIONER

## 2022-03-09 NOTE — PROGRESS NOTES
OB INTAKE INTERVIEW  Patient is 26 y o y o  who presents for OB intake at 10wks  She is accompanied by: herself  The father of her baby Alberto Morris) is involved in the pregnancy and is 29years old    Last Menstrual Period: 21  Ultrasound: Measured 8 weeks 0 days on 2022 by Sam Gilbert  Estimated Date of Delivery: 10/4/2022 confirmed by 8 week US    Signs/Symptoms of Pregnancy  Current pregnancy symptoms: nausea, fatigue  Constipation YES- advised enough H20 and colace  Headaches no  Cramping/spotting no  PICA cravings no    Diabetes-  Body mass index is 32 28 kg/m²  If patient has 1 or more, please order early 1 hour GTT  History of GDM no  BMI >35 no  History of PCOS or current metformin use no  History of LGA/macrosomic infant (4000g/9lbs) no    If patient has 2 or more, please order early 1 hour GTT  BMI>30 YES  AMA no  First degree relative with type 2 diabetes no  History of chronic HTN, hyperlipidemia, elevated A1C no  High risk race (, , ,  or ) no    Hypertension- if you answer yes, please order preeclampsia labs (cbc, comprehensive metabolic panel, urine protein creatinine ratio, uric acid)  History of of chronic HTN no  History of gestational HTN no  History of preeclampsia, eclampsia, or HELLP syndrome no  History of diabetes no  History of lupus, autoimmune disease, kidney disease no    Thyroid- if yes order TSH with reflex T4  History of thyroid disease no    Bleeding Disorder or Hx of DVT-patient or first degree relative with history of  Order the following if not done previously     (Factor V, antithrombin III, prothrombin gene mutation, protein C and S Ag, lupus anticoagulant, anticardiolipin, beta-2 glycoprotein)   no    OB/GYN-  History of abnormal pap smear no  History of HPV no  History of Herpes/HSV no  History of other STI (gonorrhea, chlamydia, trich) no  History of prior  no  History of prior  no  History of  delivery prior to 36 weeks 6 days no  History of blood transfusion no  Ok for blood transfusion YES    Substance screening- if yes outside of tobacco for her or anyone in her home-order urine drug screen  History of tobacco use no  Currently using tobacco no  Currently using alcohol no  Presently using drugs no  Past drug use  no  IV drug use-If yes add Hep C antibody to labs no  Partner drug use no  Parent/Family drug use no    MRSA Screening-   Does the pt have a hx of MRSA? no  If yes- please follow MRSA protocol and obtain a nasal swab for MRSA culture    Immunizations:  Influenza vaccine given this season YES  Discussed Tdap vaccine YES  Discussed COVID Vaccine YES + booster    Genetic/MFM-  Do you or your partner have a history of any of the following in yourselves or first degree relatives? Cystic fibrosis no  Spinal muscular atrophy no  Hemoglobinopathy/Sickle Cell/Thalassemia no  Fragile X Intellectual Disability no    If yes, discuss carrier screening and recommend consultation with [facility]/genetic counseling  If no, discuss option for carrier screening and/or genetic testing with Nuchal Ul[facility]  Patient interested YES  Appointment at [facility] made YES, NT is 3/30 and level 2 scan is 5/25    Interview education  [facility] Pregnancy Essentials Book reviewed and discussed  YES    Nurse/Family Partnership- patient may qualify ;YES referral placed NO    Prenatal lab work scripts YES  Extra labs ordered:  none    The patient has a history now or in prior pregnancy notable for:  none      Details that I feel the provider should be aware of: Mendoza Waynes and Santa Jackson are both expecting their first baby! 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

## 2022-03-09 NOTE — PATIENT INSTRUCTIONS
Congratulations!! Please review our Pregnancy Essential Guide and [employer] L&D Virtual tour from our [employer]  [facility]  [facility] ([address] [place])     [address])         Pregnancy at 7 to 10 Weeks   AMBULATORY CARE:   Changes happening with your body:  Pregnancy hormones may cause your body to go through many changes during this stage of your pregnancy  You may feel more tired than usual, and have mood swings, nausea and vomiting, and headaches  You may gain or lose some weight  Your breasts may feel tender and swollen and you may urinate more often  You may have cravings for certain foods or dislike of foods you normally eat  You may also have heartburn or constipation  Seek care immediately if:   · You have pain or cramping in your abdomen or low back  · You have heavy vaginal bleeding or clotting  · You pass material that looks like tissue or large clots  Collect the material and bring it with you  Call your doctor or obstetrician if:   · You have light bleeding  · You have chills or a fever  · You have vaginal itching, burning, or pain  · You have yellow, green, white, or foul-smelling vaginal discharge  · You have pain or burning when you urinate, less urine than usual, or pink or bloody urine  · You have questions or concerns about your condition or care  How to care for yourself at this stage of your pregnancy:       · Manage nausea and vomiting  Avoid fatty and spicy foods  Eat small meals throughout the day instead of large meals  Gabi may help to decrease nausea  Ask your healthcare provider about other ways of decreasing nausea and vomiting  · Eat a variety of healthy foods  Healthy foods include fruits, vegetables, whole-grain breads, low-fat dairy foods, beans, lean meats, and fish  Drink liquids as directed   Ask how much liquid to drink each day and which liquids are best for you  Limit caffeine to less than 200 milligrams each day  Limit your intake of fish to 2 servings each week  Choose fish low in mercury such as canned light tuna, shrimp, salmon, cod, or tilapia  Do not  eat fish high in mercury such as swordfish, tilefish, chio mackerel, and shark  · Take prenatal vitamins as directed  Your need for certain vitamins and minerals, such as folic acid, increases during pregnancy  Prenatal vitamins provide some of the extra vitamins and minerals you need  Prenatal vitamins may also help to decrease the risk of certain birth defects  · Ask how much weight you should gain each month  Too much or too little weight gain can be unhealthy for you and your baby  · Talk to your healthcare provider about exercise  Moderate exercise can help you stay fit  Your healthcare provider will help you plan an exercise program that is safe for you during pregnancy  · Do not smoke  Smoking increases your risk of a miscarriage and other health problems during your pregnancy  Smoking can cause your baby to be born too early or weigh less at birth  Quit smoking as soon as you think you might be pregnant  Ask your healthcare provider for information if you need help quitting  · Do not drink alcohol  Alcohol passes from your body to your baby through the placenta  It can affect your baby's brain development and cause fetal alcohol syndrome (FAS)  FAS is a group of conditions that causes mental, behavior, and growth problems  · Talk to your healthcare provider before you take any medicines  Many medicines may harm your baby if you take them when you are pregnant  Do not take any medicines, vitamins, herbs, or supplements without first talking to your healthcare provider  Never use illegal or street drugs (such as marijuana or cocaine) while you are pregnant  Safety tips during pregnancy:   · Avoid hot tubs and saunas    Do not use a hot tub or sauna while you are pregnant, especially during your first trimester  Hot tubs and saunas may raise your baby's temperature and increase the risk of birth defects  · Avoid toxoplasmosis  This is an infection caused by eating raw meat or being around infected cat feces  It can cause birth defects, miscarriages, and other problems  Wash your hands after you touch raw meat  Make sure any meat is well-cooked before you eat it  Avoid raw eggs and unpasteurized milk  Use gloves or ask someone else to clean your cat's litter box while you are pregnant  Changes happening with your baby:  By 10 weeks, your baby will be about 2½ inches long from the top of the head to the rump (baby's bottom)  Your baby weighs about ½ ounce  Major body organs, such as the brain, heart, and lungs, are forming  Your baby's facial features are also starting to form  Prenatal care:  Prenatal care is a series of visits with your healthcare provider throughout your pregnancy  During the first 28 weeks of your pregnancy, you will see your healthcare provider 1 time each month  Prenatal care can help prevent problems during pregnancy and childbirth  Your healthcare provider will check your blood pressure and weight  Your baby's heart rate will also be checked  You may also need the following at some visits:  · A pelvic exam  allows your healthcare provider to see your cervix (the bottom part of your uterus)  Your healthcare provider will use a speculum to open your vagina  He or she will check the size and shape of your uterus  You may also have a Pap smear at your first prenatal visit  This is a test to check your cervix for abnormal cells  · Blood tests  may be done to check for any of the following:     ? Gestational diabetes or anemia (low iron level)    ? Blood type or Rh factor, or certain birth defects    ? Immunity to certain diseases, such as chickenpox or rubella    ?  An infection, such as a sexually transmitted infection, HIV, or hepatitis B    · Hepatitis B  may need to be prevented or treated  Hepatitis B is inflammation of the liver caused by the hepatitis B virus (HBV)  HBV can spread from a mother to her baby during delivery  You will be checked for HBV as early as possible in the first trimester of each pregnancy  You need the test even if you received the hepatitis B vaccine or were tested before  You may need to have an HBV infection treated before you give birth  · Urine tests  may also be done to check for sugar and protein  These can be signs of gestational diabetes or preeclampsia  Urine tests may also be done to check for signs of infection  · A fetal ultrasound  shows pictures of your baby inside your uterus  The pictures are used to check your baby's development, movement, and position  · Genetic disorder screening tests  may be offered to you  These screening tests check your baby's risk for genetic disorders such as Down syndrome  A screening test includes a blood test and ultrasound  Follow up with your doctor or obstetrician as directed:  Go to all prenatal visits  Write down your questions so you remember to ask them during your visits  © Copyright [employer] 2022 Information is for End User's use only and may not be sold, redistributed or otherwise used for commercial purposes  All illustrations and images included in CareNotes® are the copyrighted property of A D A M , Inc  or [facility] [employer]   The above information is an  only  It is not intended as medical advice for individual conditions or treatments  Talk to your doctor, nurse or pharmacist before following any medical regimen to see if it is safe and effective for you

## 2022-03-10 ENCOUNTER — APPOINTMENT (OUTPATIENT)
Dept: LAB | Facility: MEDICAL CENTER | Age: 27
End: 2022-03-10
Payer: COMMERCIAL

## 2022-03-10 DIAGNOSIS — Z34.01 ENCOUNTER FOR SUPERVISION OF NORMAL FIRST PREGNANCY IN FIRST TRIMESTER: ICD-10-CM

## 2022-03-10 LAB
ABO GROUP BLD: NORMAL
BACTERIA UR QL AUTO: ABNORMAL /HPF
BASOPHILS # BLD AUTO: 0.06 THOUSANDS/ΜL (ref 0–0.1)
BASOPHILS NFR BLD AUTO: 0 % (ref 0–1)
BILIRUB UR QL STRIP: NEGATIVE
BLD GP AB SCN SERPL QL: NEGATIVE
CAOX CRY URNS QL MICRO: ABNORMAL /HPF
CLARITY UR: ABNORMAL
COLOR UR: ABNORMAL
EOSINOPHIL # BLD AUTO: 0.17 THOUSAND/ΜL (ref 0–0.61)
EOSINOPHIL NFR BLD AUTO: 1 % (ref 0–6)
ERYTHROCYTE [DISTWIDTH] IN BLOOD BY AUTOMATED COUNT: 12 % (ref 11.6–15.1)
GLUCOSE UR STRIP-MCNC: NEGATIVE MG/DL
HCT VFR BLD AUTO: 40 % (ref 34.8–46.1)
HGB BLD-MCNC: 13.7 G/DL (ref 11.5–15.4)
HGB UR QL STRIP.AUTO: NEGATIVE
IMM GRANULOCYTES # BLD AUTO: 0.05 THOUSAND/UL (ref 0–0.2)
IMM GRANULOCYTES NFR BLD AUTO: 0 % (ref 0–2)
KETONES UR STRIP-MCNC: NEGATIVE MG/DL
LEUKOCYTE ESTERASE UR QL STRIP: NEGATIVE
LYMPHOCYTES # BLD AUTO: 2.99 THOUSANDS/ΜL (ref 0.6–4.47)
LYMPHOCYTES NFR BLD AUTO: 22 % (ref 14–44)
MCH RBC QN AUTO: 27.6 PG (ref 26.8–34.3)
MCHC RBC AUTO-ENTMCNC: 34.3 G/DL (ref 31.4–37.4)
MCV RBC AUTO: 81 FL (ref 82–98)
MONOCYTES # BLD AUTO: 0.8 THOUSAND/ΜL (ref 0.17–1.22)
MONOCYTES NFR BLD AUTO: 6 % (ref 4–12)
MUCOUS THREADS UR QL AUTO: ABNORMAL
NEUTROPHILS # BLD AUTO: 9.54 THOUSANDS/ΜL (ref 1.85–7.62)
NEUTS SEG NFR BLD AUTO: 71 % (ref 43–75)
NITRITE UR QL STRIP: NEGATIVE
NON-SQ EPI CELLS URNS QL MICRO: ABNORMAL /HPF
NRBC BLD AUTO-RTO: 0 /100 WBCS
PH UR STRIP.AUTO: 5.5 [PH]
PLATELET # BLD AUTO: 333 THOUSANDS/UL (ref 149–390)
PMV BLD AUTO: 9.8 FL (ref 8.9–12.7)
PROT UR STRIP-MCNC: ABNORMAL MG/DL
RBC # BLD AUTO: 4.97 MILLION/UL (ref 3.81–5.12)
RBC #/AREA URNS AUTO: ABNORMAL /HPF
RH BLD: POSITIVE
SP GR UR STRIP.AUTO: 1.03 (ref 1–1.03)
UROBILINOGEN UR STRIP-ACNC: <2 MG/DL
WBC # BLD AUTO: 13.61 THOUSAND/UL (ref 4.31–10.16)
WBC #/AREA URNS AUTO: ABNORMAL /HPF

## 2022-03-10 PROCEDURE — 87086 URINE CULTURE/COLONY COUNT: CPT

## 2022-03-10 PROCEDURE — 36415 COLL VENOUS BLD VENIPUNCTURE: CPT

## 2022-03-10 PROCEDURE — 80081 OBSTETRIC PANEL INC HIV TSTG: CPT

## 2022-03-10 PROCEDURE — 81001 URINALYSIS AUTO W/SCOPE: CPT

## 2022-03-10 PROCEDURE — 86803 HEPATITIS C AB TEST: CPT

## 2022-03-11 LAB
HBV SURFACE AG SER QL: NORMAL
HCV AB SER QL: NORMAL
HIV 1+2 AB+HIV1 P24 AG SERPL QL IA: NORMAL
RPR SER QL: NORMAL
RUBV IGG SERPL IA-ACNC: 109.5 IU/ML

## 2022-03-13 ENCOUNTER — TELEPHONE (OUTPATIENT)
Dept: OBGYN CLINIC | Facility: MEDICAL CENTER | Age: 27
End: 2022-03-13

## 2022-03-13 LAB — BACTERIA UR CULT: ABNORMAL

## 2022-03-23 ENCOUNTER — INITIAL PRENATAL (OUTPATIENT)
Dept: OBGYN CLINIC | Facility: CLINIC | Age: 27
End: 2022-03-23

## 2022-03-23 VITALS — WEIGHT: 198 LBS | SYSTOLIC BLOOD PRESSURE: 110 MMHG | DIASTOLIC BLOOD PRESSURE: 72 MMHG | BODY MASS INDEX: 32.95 KG/M2

## 2022-03-23 DIAGNOSIS — Z34.02 FIRST PREGNANCY, SECOND TRIMESTER: Primary | ICD-10-CM

## 2022-03-23 DIAGNOSIS — Z34.01 ENCOUNTER FOR SUPERVISION OF NORMAL FIRST PREGNANCY IN FIRST TRIMESTER: ICD-10-CM

## 2022-03-23 PROBLEM — N91.1 SECONDARY AMENORRHEA: Status: RESOLVED | Noted: 2022-02-23 | Resolved: 2022-03-23

## 2022-03-23 LAB
SL AMB  POCT GLUCOSE, UA: NORMAL
SL AMB POCT URINE PROTEIN: NORMAL

## 2022-03-23 PROCEDURE — 87591 N.GONORRHOEAE DNA AMP PROB: CPT | Performed by: PHYSICIAN ASSISTANT

## 2022-03-23 PROCEDURE — PNV: Performed by: PHYSICIAN ASSISTANT

## 2022-03-23 PROCEDURE — 87491 CHLMYD TRACH DNA AMP PROBE: CPT | Performed by: PHYSICIAN ASSISTANT

## 2022-03-23 NOTE — PROGRESS NOTES
Problem List Items Addressed This Visit        Other    First pregnancy, second trimester - Primary     27 y o  female here for routine PN visit at Unknown  Feels well overall  Good fetal movement  First OB labs - normal  Gc/chlamydia - collected today   Pap - 6/8/21 neg  Blue folder - given today, discussed  Genetic screening - has MFM appt 3/30  Flu shot - received  COVID vaccine - received x 3    Some constipation, managing with increased fluids   Aware can use Miralax PRN   Having nausea with PN vitamins - will switch to gummies for now    FHTs by US today - 150           Other Visit Diagnoses     Encounter for supervision of normal first pregnancy in first trimester

## 2022-03-23 NOTE — ASSESSMENT & PLAN NOTE
27 y o  female here for routine PN visit at [address] well overall  Good fetal movement  First OB labs - normal  Gc/chlamydia - collected today   Pap - 6/8/21 neg  Blue folder - given today, discussed  Genetic screening - has MFM appt 3/30  Flu shot - received  COVID vaccine - received x 3    Some constipation, managing with increased fluids   Aware can use Miralax PRN   Having nausea with PN vitamins - will switch to gummies for now    FHTs by US today - 150

## 2022-03-23 NOTE — PROGRESS NOTES
1 ST OB VISIT, she has no complaints  Pap up to date (6/8/2021, neg)  Denies any bleeding or cramping   S/p covid X 3 and Flu vaccines  Blue folder given today

## 2022-03-25 LAB
C TRACH DNA SPEC QL NAA+PROBE: NEGATIVE
N GONORRHOEA DNA SPEC QL NAA+PROBE: NEGATIVE

## 2022-03-28 NOTE — PROGRESS NOTES
Please refer to the [facility] ultrasound report in Ob Procedures for additional information regarding today's visit

## 2022-03-30 ENCOUNTER — DOCUMENTATION (OUTPATIENT)
Dept: PERINATAL CARE | Facility: OTHER | Age: 27
End: 2022-03-30

## 2022-03-30 ENCOUNTER — ROUTINE PRENATAL (OUTPATIENT)
Dept: PERINATAL CARE | Facility: OTHER | Age: 27
End: 2022-03-30
Payer: COMMERCIAL

## 2022-03-30 VITALS
WEIGHT: 199.2 LBS | BODY MASS INDEX: 33.19 KG/M2 | DIASTOLIC BLOOD PRESSURE: 85 MMHG | HEART RATE: 108 BPM | HEIGHT: 65 IN | SYSTOLIC BLOOD PRESSURE: 124 MMHG

## 2022-03-30 DIAGNOSIS — Z36.82 ENCOUNTER FOR ANTENATAL SCREENING FOR NUCHAL TRANSLUCENCY: Primary | ICD-10-CM

## 2022-03-30 DIAGNOSIS — Z3A.13 13 WEEKS GESTATION OF PREGNANCY: ICD-10-CM

## 2022-03-30 DIAGNOSIS — O99.211 MATERNAL OBESITY, ANTEPARTUM, FIRST TRIMESTER: ICD-10-CM

## 2022-03-30 DIAGNOSIS — N91.1 SECONDARY AMENORRHEA: ICD-10-CM

## 2022-03-30 PROCEDURE — 76813 OB US NUCHAL MEAS 1 GEST: CPT | Performed by: OBSTETRICS & GYNECOLOGY

## 2022-03-30 PROCEDURE — 99203 OFFICE O/P NEW LOW 30 MIN: CPT | Performed by: OBSTETRICS & GYNECOLOGY

## 2022-03-30 RX ORDER — ASPIRIN 81 MG/1
162 TABLET, CHEWABLE ORAL DAILY
Qty: 180 TABLET | Refills: 1 | Status: SHIPPED | OUTPATIENT
Start: 2022-03-30 | End: 2022-06-28 | Stop reason: SDUPTHER

## 2022-03-30 NOTE — PROGRESS NOTES
Patient chose to use [facility] lab and was given lab slip for the Noninvasive Prenatal Screen -[facility] Qnatal Advanced  Blood sample is drawn at [facility] and online appointment scheduling and lab locations can be found @ www  [employer]     Qnatal  card given, patient instructed how to check her out- of -pocket responsibility @ [employer]  Patient aware that  is provided by third party and is only an estimate of cost ,not a guarantee  For definitive cost, patient encouraged to call her insurance provider- insurance phone # located on the back of her ID card  Some Insurance plans may require prior authorization before blood is drawn  Patient instructed to check with her plan before she goes to lab and notify [facility]  Patient made aware she may be responsible for full cost of test if lab drawn before prior authorization obtained  Insurance Authorization may take up to 14 business days, patient made aware insurance authorization does not mean test is covered 100%  Information on how to check OOP NIPT coverage/ check if a prior authorization needed for NIPT was also provided to patient during the appointment scheduling of her [facility] NT appt  Patient made aware Qnatal results typically are available in 7-10 business days after blood draw and to call [facility] if she does not receive notification of her results  Patient made aware that viewing Qnatal results online will reveal gender  Patient verbalized understanding of all instructions and no questions at this time

## 2022-03-30 NOTE — LETTER
March 30, 2022     Sekou Davis, [address] [facility] [address]    Patient: Matthew Alfaro   YOB: 1995   Date of Visit: 3/30/2022       Dear Dr Soo Gloria: Thank you for referring Matthew Alfaro to me for evaluation  Below are my notes for this consultation  If you have questions, please do not hesitate to call me  I look forward to following your patient along with you  Sincerely,        Kristina Peters MD        CC: No Recipients  Kristina Peters MD  3/28/2022  2:04 PM  Sign when Signing Visit  Please refer to the [facility] ultrasound report in Ob Procedures for additional information regarding today's visit

## 2022-03-30 NOTE — PROGRESS NOTES
Request for prior authorization submitted through availity along with ultrasound report  Confirmation receipt obtained and scanned into chart

## 2022-04-01 ENCOUNTER — TELEPHONE (OUTPATIENT)
Dept: PERINATAL CARE | Facility: CLINIC | Age: 27
End: 2022-04-01

## 2022-04-01 NOTE — TELEPHONE ENCOUNTER
Left [facility] for pt and informed her insurance Authorization for her NIPT ([id number])  genetic screening has been approved  Auth # is [id number] with dates of service 3/30/2022-5/30/2022  Insurance Authorization is not a guarantee of payment and final determination is based on your member benefits, appropriateness of service provided and eligibility at time of services rendered and claim received  Please check if you have any OOP lab co-pay or deductible prior to completing screening  Pt instructed to contact our office with questions

## 2022-04-07 LAB
# FETUSES US: 1
CFDNA.FET/TOTAL PLAS.CFDNA: NORMAL
FET CHR 13 TS PLAS.CFDNA QL: NEGATIVE
FET CHR 18 TS PLAS.CFDNA QL: NEGATIVE
FET CHR 21 TS PLAS.CFDNA QL: NEGATIVE
FET CHR X + Y ANEUP PLAS.CFDNA QL: NORMAL
FET CHROM X + Y ANEUP CFDNA IMP: NORMAL
FET Y CHROM PLAS.CFDNA QL: DETECTED
FET Y CHROM PLAS.CFDNA: NORMAL
GA (DAYS): 3 D
GA (WEEKS): 13 WK
MICRODELETION SYND BLD/T FISH: NORMAL
REF LAB TEST METHOD: NORMAL
SERVICE CMNT-IMP: NORMAL
SERVICE CMNT-IMP: NORMAL
SL AMB ABNORMAL MSS?: NORMAL
SL AMB ABNORMAL US?: NORMAL
SL AMB ADVANCED MATERNAL AGE?: NORMAL
SL AMB MICRODELETION INTERP: NORMAL
SL AMB MICRODELETION: NORMAL
SL AMB PERSONAL/FAM HISTORY?: NORMAL
SL AMB SPECIFICATIONS: NORMAL

## 2022-04-08 NOTE — RESULT ENCOUNTER NOTE
I reviewed the lab study today and the results revealed low risks for trisomy 24, 25, 15, and sex chromosome aneuploidies

## 2022-04-18 ENCOUNTER — ROUTINE PRENATAL (OUTPATIENT)
Dept: OBGYN CLINIC | Facility: CLINIC | Age: 27
End: 2022-04-18

## 2022-04-18 VITALS — SYSTOLIC BLOOD PRESSURE: 118 MMHG | BODY MASS INDEX: 32.95 KG/M2 | DIASTOLIC BLOOD PRESSURE: 74 MMHG | WEIGHT: 198 LBS

## 2022-04-18 DIAGNOSIS — O99.212 OBESITY AFFECTING PREGNANCY IN SECOND TRIMESTER: ICD-10-CM

## 2022-04-18 DIAGNOSIS — Z34.02 FIRST PREGNANCY, SECOND TRIMESTER: Primary | ICD-10-CM

## 2022-04-18 PROBLEM — O99.210 OBESITY AFFECTING PREGNANCY: Status: ACTIVE | Noted: 2022-04-18

## 2022-04-18 PROCEDURE — PNV: Performed by: CLINICAL NURSE SPECIALIST

## 2022-04-18 NOTE — PROGRESS NOTES
Prenatal Visit  Subjective:   Elida Anne is a 27 y o   15w6d here for Routine Prenatal Visit     She reports that she is having the following symptoms: none  Feels well  Reports she may have started feeling some little flutters of activity  She denies any cramping, LOF/unusual discharge or VB  She has obtained her prenatal labs and the results have been reviewed with her  Taking LDA as recommended  (BMI/nulliparous)  The patient has chosen to undergo  NIPT genetic screening  Her Level II u/s has been ordered and has been scheduled  Flu vaccine: already has      Objective:  Vitals:    22 1451   BP: 118/74     Pregravid Weight/BMI: 88 kg (194 lb) (BMI 32 28)  Current Weight: 89 8 kg (198 lb)   Total Weight Gain: 1 814 kg (4 lb)     Fetal Heart Rate: 155      OBGyn Exam  Physical Exam  Fetal Heart Rate: 155 ,      General: Not in acute distress and appearing well nourished and well groomed  Genitourinary:          Pelvic exam exam deferred   Cardiovascular:   Rate and Rhythm: Normal rate  Pulmonary:    Effort: normal, not labored  Abdominal:  Abdomen is soft   Musculoskeletal: Active movement of all extremities, no gross limitations of ROM     Edema:None noted  Neurological:   Mental Status: She is alert and oriented to person, place, and time  Skin: General: Skin is warm and dry  Psychiatric:    Mood and Affect: Mood normal       Behavior: Behavior normal          Assessment & Plan:        1  First pregnancy, second trimester  Assessment & Plan:  , 15w6d  Doing well overall  Genetic screening NIPT- nml  Discussed recommended testing for ONTD - AFP and order placed  Keep appt for Level 2 US  We reviewed the common symptoms of this stage of pregnancy as well as warning signs/symptoms, including spotting/bleeding, severe pain or persistent nausea and vomiting with an inability keep anything down for 24 hours      I have reviewed and updated the patient's problem list Her questions were answered to her satisfaction  Orders:  -     Alpha fetoprotein, maternal; Future; Expected date: 04/19/2022  -     Glucose, 1H PG; Future; Expected date: 04/18/2022  -     POCT urine dip    2  Obesity affecting pregnancy in second trimester  Assessment & Plan:  Pre-gest BMI 32  Early glucola not done w/ initial PNBW - ordered today, can be done with AFP  Discussed approp wt gain for pregnancy     Taking LDA for Pree ppx    Orders:  -     Glucose, 1H PG; Future; Expected date: 04/18/2022      KATT Renee  4/19/2022

## 2022-04-18 NOTE — ASSESSMENT & PLAN NOTE
Pre-gest BMI 32  Early glucola not done w/ initial PNBW - ordered today, can be done with AFP  Discussed approp wt gain for pregnancy     Taking LDA for Pree ppx

## 2022-04-18 NOTE — ASSESSMENT & PLAN NOTE
, 15w6d  Doing well overall  Genetic screening NIPT- nml  Discussed recommended testing for ONTD - AFP and order placed  Keep appt for Level 2 US  We reviewed the common symptoms of this stage of pregnancy as well as warning signs/symptoms, including spotting/bleeding, severe pain or persistent nausea and vomiting with an inability keep anything down for 24 hours  I have reviewed and updated the patient's problem list Her questions were answered to her satisfaction

## 2022-04-18 NOTE — PROGRESS NOTES
Materni 21 done- AFP given  Prenatal labs complete  Level 2- 5/25  Denies cramping and bleeding  Round ligament discomfort  Constipation better

## 2022-04-19 LAB
SL AMB  POCT GLUCOSE, UA: NORMAL
SL AMB POCT URINE PROTEIN: NORMAL

## 2022-04-30 ENCOUNTER — APPOINTMENT (OUTPATIENT)
Dept: LAB | Facility: CLINIC | Age: 27
End: 2022-04-30
Payer: COMMERCIAL

## 2022-04-30 DIAGNOSIS — O99.212 OBESITY AFFECTING PREGNANCY IN SECOND TRIMESTER: ICD-10-CM

## 2022-04-30 DIAGNOSIS — Z34.02 FIRST PREGNANCY, SECOND TRIMESTER: ICD-10-CM

## 2022-04-30 LAB — GLUCOSE 1H P 50 G GLC PO SERPL-MCNC: 133 MG/DL (ref 40–134)

## 2022-04-30 PROCEDURE — 82950 GLUCOSE TEST: CPT

## 2022-04-30 PROCEDURE — 36415 COLL VENOUS BLD VENIPUNCTURE: CPT

## 2022-04-30 PROCEDURE — 82105 ALPHA-FETOPROTEIN SERUM: CPT

## 2022-05-05 LAB
2ND TRIMESTER 4 SCREEN SERPL-IMP: NORMAL
AFP ADJ MOM SERPL: 0.92
AFP INTERP AMN-IMP: NORMAL
AFP INTERP SERPL-IMP: NORMAL
AFP INTERP SERPL-IMP: NORMAL
AFP SERPL-MCNC: 33.4 NG/ML
AGE AT DELIVERY: 29.4 YR
GA METHOD: NORMAL
GA: 17.6 WEEKS
IDDM PATIENT QL: NO
MULTIPLE PREGNANCY: NO
NEURAL TUBE DEFECT RISK FETUS: NORMAL %

## 2022-05-13 ENCOUNTER — TELEPHONE (OUTPATIENT)
Dept: OBGYN CLINIC | Facility: CLINIC | Age: 27
End: 2022-05-13

## 2022-05-13 NOTE — TELEPHONE ENCOUNTER
Yes, ok to monitor  Should hydrate, use stool softeners or laxatives as needed for constipation, OK to use prep H for hemorroids

## 2022-05-13 NOTE — TELEPHONE ENCOUNTER
Pt called and is 19w3d  She said for past day or so has been struggling with constipation  She said past 2 bowel mvts noticed some pink discharge, and tried to wipe vaginal and rectal area and does believe it is coming from rectum  Asking if this is normal or not? I told her sometimes this can happen when straining but I would get reassurance for her  Anything I can let her know to do? Denies any cramping or other sx  Mentioned not really feeling mvt yet but I did inform she is early  Please advise thanks!

## 2022-05-18 ENCOUNTER — ROUTINE PRENATAL (OUTPATIENT)
Dept: OBGYN CLINIC | Facility: CLINIC | Age: 27
End: 2022-05-18

## 2022-05-18 VITALS — WEIGHT: 201.4 LBS | SYSTOLIC BLOOD PRESSURE: 118 MMHG | BODY MASS INDEX: 33.51 KG/M2 | DIASTOLIC BLOOD PRESSURE: 74 MMHG

## 2022-05-18 DIAGNOSIS — O99.212 OBESITY AFFECTING PREGNANCY IN SECOND TRIMESTER: ICD-10-CM

## 2022-05-18 DIAGNOSIS — Z34.02 FIRST PREGNANCY, SECOND TRIMESTER: Primary | ICD-10-CM

## 2022-05-18 LAB
SL AMB  POCT GLUCOSE, UA: NORMAL
SL AMB POCT URINE PROTEIN: NORMAL

## 2022-05-18 PROCEDURE — PNV: Performed by: PHYSICIAN ASSISTANT

## 2022-05-18 NOTE — PROGRESS NOTES
Problem List Items Addressed This Visit        Other    First pregnancy, second trimester - Primary     32 y o  female here for routine PN visit at 20w1d  Feels well overall  Possibly some early fetal movement  First OB labs - normal  Gc/chlamydia - 3/23/22 neg  Pap - 6/8/21 neg  Blue folder - has  Genetic screening - Qnatal neg, MSAFP normal   It's a boy - Sonny     COVID vaccine - yes x 3   Level II scheduled             Relevant Orders    POCT urine dip (Completed)    Obesity affecting pregnancy     Early normal glucola  Maintained on aspirin 162mg po daily   Goal 11-20# TWG  Currently at 7#  For growth scan at 32 weeks

## 2022-05-18 NOTE — ASSESSMENT & PLAN NOTE
Early normal glucola  Maintained on aspirin 162mg po daily   Goal 11-20# TWG  Currently at 7#  For growth scan at 32 weeks

## 2022-05-18 NOTE — PROGRESS NOTES
Feels well, she has no complaints  Some FM  Denies any bleeding or cramping   Level II scheduled 5/25/2022  It's a Boy  28 wk labs next visit

## 2022-05-18 NOTE — ASSESSMENT & PLAN NOTE
32 y o  female here for routine PN visit at [address] [facility] well overall  Possibly some early fetal movement  First OB labs - normal  Gc/chlamydia - 3/23/22 neg  Pap - 6/8/21 neg  Blue folder - has  Genetic screening - Qnatal neg, MSAFP normal   It's a boy - Sonny CANTRELL vaccine - yes x 3   Level II scheduled

## 2022-05-20 ENCOUNTER — TELEPHONE (OUTPATIENT)
Dept: OBGYN CLINIC | Facility: CLINIC | Age: 27
End: 2022-05-20

## 2022-05-20 NOTE — TELEPHONE ENCOUNTER
----- Message from Maki Ramirez sent at 5/19/2022  5:55 PM EDT -----  Regarding: Vaginal Burning and Discharge   Hi Dr America Reilly,    Throughout the day today Anai noticed some vaginal irritation and burning sensations  Avinash Bahena also been having a bit more vaginal discharge than usual, but I havent noticed a change in color, odor, or consistency  Should I be concerned?     Thanks,  Shanta Lino

## 2022-05-25 ENCOUNTER — ROUTINE PRENATAL (OUTPATIENT)
Dept: PERINATAL CARE | Facility: OTHER | Age: 27
End: 2022-05-25
Payer: COMMERCIAL

## 2022-05-25 VITALS
HEIGHT: 65 IN | WEIGHT: 203.2 LBS | DIASTOLIC BLOOD PRESSURE: 68 MMHG | BODY MASS INDEX: 33.85 KG/M2 | SYSTOLIC BLOOD PRESSURE: 103 MMHG | HEART RATE: 125 BPM

## 2022-05-25 DIAGNOSIS — Z3A.21 21 WEEKS GESTATION OF PREGNANCY: ICD-10-CM

## 2022-05-25 DIAGNOSIS — O99.212 OBESITY DURING PREGNANCY IN SECOND TRIMESTER: Primary | ICD-10-CM

## 2022-05-25 DIAGNOSIS — Z36.86 ENCOUNTER FOR ANTENATAL SCREENING FOR CERVICAL LENGTH: ICD-10-CM

## 2022-05-25 PROCEDURE — 76811 OB US DETAILED SNGL FETUS: CPT | Performed by: OBSTETRICS & GYNECOLOGY

## 2022-05-25 PROCEDURE — 99213 OFFICE O/P EST LOW 20 MIN: CPT | Performed by: OBSTETRICS & GYNECOLOGY

## 2022-05-25 PROCEDURE — 76817 TRANSVAGINAL US OBSTETRIC: CPT | Performed by: OBSTETRICS & GYNECOLOGY

## 2022-05-25 NOTE — LETTER
May 25, 2022     Patricia Ramirez, [address]  [facility]    Patient: Jeremías Anna   YOB: 1995   Date of Visit: 5/25/2022       Dear Dr Camille Lamar: Thank you for referring Jeremías Anna to me for evaluation  Below are my notes for this consultation  If you have questions, please do not hesitate to call me  I look forward to following your patient along with you  Sincerely,        Severiano Hughs, MD        CC: No Recipients  Severiano Hughs, MD  5/25/2022  2:29 PM  Sign when Signing Visit  Jeremías Anna  has no complaints today at 21w1d  She reports fetal movements and does not report any vaginal bleeding or signs of labor  Her recently completed fetal testing revealed a normal MSAFP and normal NIPT  Is a family history of large babies on her partner side  Problem list:  1  Pre gravid BMI of 32  She is on baby aspirin    Ultrasound findings: The ultrasound today shows normal interval fetal growth and fluid, normal cervical length, and no malformations were detected  Pregnancy ultrasound has limitations and is unable to detect all forms of fetal congenital abnormalities  Specific counseling was provided on the following problems:  1  Patient asked about allergies  Discussed options of Zyrtec, Claritin, Benadryl, Flonase OB would be 1st line recommendation  Flonase is absorbed locally and locally effective  Follow up recommended:   1  Recommend a 32 week growth scan    Pre visit time reviewing her records   5 minutes  Face to face time 10 minutes  Post visit time on documentation of note, updating her problem list, adding orders and prescriptions 5 minutes  Procedures that were completed today were charged separately  The level of decision making was low level complexity      Severiano Hughs, MD

## 2022-05-25 NOTE — PROGRESS NOTES
Ultrasound Probe Disinfection    A transvaginal ultrasound was performed  Prior to use, disinfection was performed with High Level Disinfection Process (Trophon)  Probe serial number S3: [id number] was used        Kyle Monge RDMS  05/25/22  12:57 PM

## 2022-05-25 NOTE — PROGRESS NOTES
Paulino Lane  has no complaints today at 21w1d  She reports fetal movements and does not report any vaginal bleeding or signs of labor  Her recently completed fetal testing revealed a normal MSAFP and normal NIPT  Is a family history of large babies on her partner side  Problem list:  1  Pre gravid BMI of 32  She is on baby aspirin    Ultrasound findings: The ultrasound today shows normal interval fetal growth and fluid, normal cervical length, and no malformations were detected  Pregnancy ultrasound has limitations and is unable to detect all forms of fetal congenital abnormalities  Specific counseling was provided on the following problems:  1  Patient asked about allergies  Discussed options of Zyrtec, Claritin, Benadryl, Flonase OB would be 1st line recommendation  Flonase is absorbed locally and locally effective  Follow up recommended:   1  Recommend a 32 week growth scan    Pre visit time reviewing her records   5 minutes  Face to face time 10 minutes  Post visit time on documentation of note, updating her problem list, adding orders and prescriptions 5 minutes  Procedures that were completed today were charged separately  The level of decision making was low level complexity      Libia Aranda MD

## 2022-06-08 ENCOUNTER — TELEPHONE (OUTPATIENT)
Dept: PERINATAL CARE | Facility: OTHER | Age: 27
End: 2022-06-08

## 2022-06-08 NOTE — TELEPHONE ENCOUNTER
Left patient a message that her 7/7 ultraound M appointment had to be rescheduled from 11:30am to 10:30am   The new time, date and location were provided  The patient has been instructed to please call us back at [phone] with any questions or concerns

## 2022-06-11 ENCOUNTER — HOSPITAL ENCOUNTER (OUTPATIENT)
Facility: HOSPITAL | Age: 27
Discharge: HOME | End: 2022-06-11
Attending: STUDENT IN AN ORGANIZED HEALTH CARE EDUCATION/TRAINING PROGRAM | Admitting: STUDENT IN AN ORGANIZED HEALTH CARE EDUCATION/TRAINING PROGRAM
Payer: COMMERCIAL

## 2022-06-11 ENCOUNTER — NURSE TRIAGE (OUTPATIENT)
Dept: OTHER | Facility: OTHER | Age: 27
End: 2022-06-11

## 2022-06-11 VITALS
HEART RATE: 100 BPM | SYSTOLIC BLOOD PRESSURE: 124 MMHG | RESPIRATION RATE: 17 BRPM | DIASTOLIC BLOOD PRESSURE: 77 MMHG | OXYGEN SATURATION: 100 % | TEMPERATURE: 97.5 F

## 2022-06-11 PROBLEM — Z3A.23 23 WEEKS GESTATION OF PREGNANCY: Status: ACTIVE | Noted: 2022-06-11

## 2022-06-11 PROCEDURE — NC001 PR NO CHARGE: Performed by: STUDENT IN AN ORGANIZED HEALTH CARE EDUCATION/TRAINING PROGRAM

## 2022-06-11 PROCEDURE — 99213 OFFICE O/P EST LOW 20 MIN: CPT

## 2022-06-12 NOTE — TELEPHONE ENCOUNTER
Reason for Disposition   [1] Pregnant 23 or more weeks AND [2] baby moving less today by kick count  (e g , kick count < 5 in 1 hour or < 10 in 2 hours)    Answer Assessment - Initial Assessment Questions  1  FETAL MOVEMENT: "Has the baby's movement decreased or changed significantly from normal?" (e g , yes, no; describe)        My normal is 3 x times a day - I have not felt baby move    2  SVITLANA: "What date are you expecting to deliver?"       10 /4/22    3  PREGNANCY: "How many weeks pregnant are you?"      24 weeks pregnant    4   OTHER SYMPTOMS: "Do you have any other symptoms?" (e g , abdominal pain, leaking fluid from vagina, vaginal bleeding, etc )       Denies headache fever vomiting bleeding LOF ctxs but complains of not feeling baby move since 1030 pm last night    Protocols used: PREGNANCY - DECREASED FETAL MOVEMENT-ADULT-

## 2022-06-12 NOTE — TELEPHONE ENCOUNTER
Patient calls in at 23 weeks 4 days EDC 10 4 22  She calls in with not feeling the baby move  She said for two weeks straight she was feelling constant movement and suddenly stopped last night  TT sent to on call  Patient sent to L& D for evaluation

## 2022-06-12 NOTE — PROGRESS NOTES
Triage Note - OB  Kareem Garg 32 y o  female MRN: [id number]  Unit/Bed#:  TRIAGE 3-01 Encounter: [id number]    OB TRIAGE NOTE  Kareem Garg  [id number]  6/11/2022  11:42 PM  LD TRIAGE 3/LD TRIAGE 3-*    ASSESS:  32 y o  Kylah Quick 23w4d with decreased fetal movement with no concern for fetal compromise at this time  PLAN  #1  Decreased fetal movement  · Patient reports feeling normal fetal movement since being in triage  · LVP: 5 8 cm  · Cardiac activity and fetal movement visualized on transabdominal ultrasound  · Patient reassured  · FHT reactive no contractions    #2  Discharge instructions  · Patient instructed to call if experiencing worsening contractions, vaginal bleeding, loss of fluid or decreased fetal movement  · Will follow up with OBGYN on 6/14/2022  D/w Dr Severino Rivera  ______________    SUBJECTIVE    SVITLANA: Estimated Date of Delivery: 10/4/22    HPI Chronology:  32 y o  Kylah Quick 23w4d presents with complaint of decreased fetal movement  She last felt baby move last night at 10 pm  She tried drinking something cold and sweet and laying on her side with no improvement  Contractions: no  Leakage: n  Bleeding: no  Fetal Movement: decreased  Pelvic pain: no    Vitals:   /81 (Patient Position: Sitting)   Pulse 98   Temp 97 5 °F (36 4 °C) (Oral)   Resp 17   LMP 12/28/2021   SpO2 100%   There is no height or weight on file to calculate BMI  Review of Systems   Constitutional: Negative for chills and fatigue  Eyes: Negative for visual disturbance  Respiratory: Negative for chest tightness and shortness of breath  Cardiovascular: Negative for chest pain and palpitations  Gastrointestinal: Negative for abdominal pain  Genitourinary: Negative for vaginal bleeding, vaginal discharge and vaginal pain  Musculoskeletal: Negative for back pain  Neurological: Negative for headaches  Physical Exam  HENT:      Head: Normocephalic     Eyes:      Conjunctiva/sclera: Conjunctivae normal    Cardiovascular:      Rate and Rhythm: Normal rate  Pulses: Normal pulses  Pulmonary:      Effort: Pulmonary effort is normal    Abdominal:      Palpations: Abdomen is soft  Tenderness: There is no abdominal tenderness  Skin:     General: Skin is warm  Capillary Refill: Capillary refill takes less than 2 seconds  Neurological:      Mental Status: She is alert and oriented to person, place, and time  Psychiatric:         Mood and Affect: Mood normal          FHT:  Baseline Rate: 135 bpm  Variability: Moderate 6-25 bpm  Accelerations: 10 x 10 (<32 weeks)  Decelerations: None  TOCO:   Contraction Frequency (minutes): 0  Contraction Duration (seconds): 0  Contraction Quality: Not applicable    Labs: No results found for this or any previous visit (from the past 24 hour(s))  Lab, Imaging and other studies: I have personally reviewed pertinent reports      Javy Corey MD  6/11/2022  11:42 PM

## 2022-06-12 NOTE — TELEPHONE ENCOUNTER
Regarding: decreased baby movement   ----- Message from Blayne Grey sent at 6/11/2022  9:18 PM EDT -----  "im 23 weeks and 4 days pregnant and i havent felt my baby move at all today "

## 2022-06-14 ENCOUNTER — ROUTINE PRENATAL (OUTPATIENT)
Dept: OBGYN CLINIC | Facility: CLINIC | Age: 27
End: 2022-06-14

## 2022-06-14 VITALS — WEIGHT: 200.8 LBS | SYSTOLIC BLOOD PRESSURE: 122 MMHG | DIASTOLIC BLOOD PRESSURE: 82 MMHG | BODY MASS INDEX: 33.41 KG/M2

## 2022-06-14 DIAGNOSIS — Z34.02 FIRST PREGNANCY, SECOND TRIMESTER: Primary | ICD-10-CM

## 2022-06-14 LAB
SL AMB  POCT GLUCOSE, UA: NORMAL
SL AMB POCT URINE PROTEIN: NORMAL

## 2022-06-14 PROCEDURE — PNV: Performed by: OBSTETRICS & GYNECOLOGY

## 2022-06-14 NOTE — PROGRESS NOTES
Patient presents for a routine prenatal visit  24W0D  GFM+   No LOF, VB or CTX  No current complaints at this time  Just this Saturday no movement but was sent to [facility] and was hooked up to monitor and baby started to move    Labs comp  Covid comp  SVITLANA 10/4/22  Pt is having a Boy  Urine protein neg/neg glucose

## 2022-06-15 ENCOUNTER — TELEPHONE (OUTPATIENT)
Dept: OBGYN CLINIC | Facility: CLINIC | Age: 27
End: 2022-06-15

## 2022-06-15 NOTE — TELEPHONE ENCOUNTER
Pt said she has previously been dxed with HS - hydradenitis suppurativa  ? She has in the past been to derm - she now has 2 pea sized lumps on l groin and 1 on r groin  They are uncomfortable  Pt concerned it will get worst  Has had this about 1 week and forgot to mention to Dr Hollie Fuller these get soaked? I don't know rx for this  Rajani Hope  [birth year]  She has no record of this on chart (that I can find)   She said it is HS    Thanks  G1,  24 and 1  Per [facility], pt to use hot compresses on it - if worsens , to pcp

## 2022-06-28 DIAGNOSIS — Z3A.13 13 WEEKS GESTATION OF PREGNANCY: ICD-10-CM

## 2022-06-29 RX ORDER — ASPIRIN 81 MG/1
162 TABLET, CHEWABLE ORAL DAILY
Qty: 180 TABLET | Refills: 0 | Status: SHIPPED | OUTPATIENT
Start: 2022-06-29 | End: 2022-09-27

## 2022-07-05 ENCOUNTER — APPOINTMENT (OUTPATIENT)
Dept: LAB | Facility: CLINIC | Age: 27
End: 2022-07-05
Payer: COMMERCIAL

## 2022-07-05 ENCOUNTER — TELEPHONE (OUTPATIENT)
Dept: OBGYN CLINIC | Facility: CLINIC | Age: 27
End: 2022-07-05

## 2022-07-05 DIAGNOSIS — Z34.02 FIRST PREGNANCY, SECOND TRIMESTER: ICD-10-CM

## 2022-07-05 DIAGNOSIS — R73.9 ELEVATED SERUM GLUCOSE: Primary | ICD-10-CM

## 2022-07-05 LAB
BASOPHILS # BLD AUTO: 0.04 THOUSANDS/ΜL (ref 0–0.1)
BASOPHILS NFR BLD AUTO: 0 % (ref 0–1)
EOSINOPHIL # BLD AUTO: 0.15 THOUSAND/ΜL (ref 0–0.61)
EOSINOPHIL NFR BLD AUTO: 1 % (ref 0–6)
ERYTHROCYTE [DISTWIDTH] IN BLOOD BY AUTOMATED COUNT: 13 % (ref 11.6–15.1)
GLUCOSE 1H P 50 G GLC PO SERPL-MCNC: 149 MG/DL (ref 40–134)
HCT VFR BLD AUTO: 36.8 % (ref 34.8–46.1)
HGB BLD-MCNC: 12.1 G/DL (ref 11.5–15.4)
IMM GRANULOCYTES # BLD AUTO: 0.05 THOUSAND/UL (ref 0–0.2)
IMM GRANULOCYTES NFR BLD AUTO: 1 % (ref 0–2)
LYMPHOCYTES # BLD AUTO: 1.68 THOUSANDS/ΜL (ref 0.6–4.47)
LYMPHOCYTES NFR BLD AUTO: 15 % (ref 14–44)
MCH RBC QN AUTO: 27.6 PG (ref 26.8–34.3)
MCHC RBC AUTO-ENTMCNC: 32.9 G/DL (ref 31.4–37.4)
MCV RBC AUTO: 84 FL (ref 82–98)
MONOCYTES # BLD AUTO: 0.55 THOUSAND/ΜL (ref 0.17–1.22)
MONOCYTES NFR BLD AUTO: 5 % (ref 4–12)
NEUTROPHILS # BLD AUTO: 8.48 THOUSANDS/ΜL (ref 1.85–7.62)
NEUTS SEG NFR BLD AUTO: 78 % (ref 43–75)
NRBC BLD AUTO-RTO: 0 /100 WBCS
PLATELET # BLD AUTO: 247 THOUSANDS/UL (ref 149–390)
PMV BLD AUTO: 9.4 FL (ref 8.9–12.7)
RBC # BLD AUTO: 4.39 MILLION/UL (ref 3.81–5.12)
RPR SER QL: NORMAL
WBC # BLD AUTO: 10.95 THOUSAND/UL (ref 4.31–10.16)

## 2022-07-05 PROCEDURE — 36415 COLL VENOUS BLD VENIPUNCTURE: CPT

## 2022-07-05 PROCEDURE — 85025 COMPLETE CBC W/AUTO DIFF WBC: CPT

## 2022-07-05 PROCEDURE — 82950 GLUCOSE TEST: CPT

## 2022-07-05 PROCEDURE — 86592 SYPHILIS TEST NON-TREP QUAL: CPT

## 2022-07-05 NOTE — TELEPHONE ENCOUNTER
Called pt -informed of elevated glucose & need for 3 hr GTT- advised this is a fasting glucose test & to arrive at 0700 at [facility]  Informed pt she will be there longer than 3 hrs   & to bring a snack & drink to have after testing  Pt verbalized understanding

## 2022-07-09 ENCOUNTER — APPOINTMENT (OUTPATIENT)
Dept: LAB | Facility: CLINIC | Age: 27
End: 2022-07-09
Payer: COMMERCIAL

## 2022-07-09 DIAGNOSIS — R73.9 ELEVATED SERUM GLUCOSE: ICD-10-CM

## 2022-07-09 LAB
GLUCOSE 1H P 100 G GLC PO SERPL-MCNC: 141 MG/DL (ref 70–183)
GLUCOSE 2H P 100 G GLC PO SERPL-MCNC: 136 MG/DL (ref 70–155)
GLUCOSE 3H P 100 G GLC PO SERPL-MCNC: 63 MG/DL (ref 70–140)
GLUCOSE P FAST SERPL-MCNC: 86 MG/DL (ref 70–105)

## 2022-07-09 PROCEDURE — 82952 GTT-ADDED SAMPLES: CPT

## 2022-07-09 PROCEDURE — 36415 COLL VENOUS BLD VENIPUNCTURE: CPT

## 2022-07-09 PROCEDURE — 82951 GLUCOSE TOLERANCE TEST (GTT): CPT

## 2022-07-14 ENCOUNTER — ROUTINE PRENATAL (OUTPATIENT)
Dept: OBGYN CLINIC | Facility: CLINIC | Age: 27
End: 2022-07-14
Payer: COMMERCIAL

## 2022-07-14 VITALS
BODY MASS INDEX: 34.22 KG/M2 | DIASTOLIC BLOOD PRESSURE: 84 MMHG | WEIGHT: 205.4 LBS | HEIGHT: 65 IN | SYSTOLIC BLOOD PRESSURE: 122 MMHG

## 2022-07-14 DIAGNOSIS — O99.212 OBESITY AFFECTING PREGNANCY IN SECOND TRIMESTER: ICD-10-CM

## 2022-07-14 DIAGNOSIS — Z23 NEED FOR TDAP VACCINATION: ICD-10-CM

## 2022-07-14 DIAGNOSIS — Z3A.28 28 WEEKS GESTATION OF PREGNANCY: ICD-10-CM

## 2022-07-14 DIAGNOSIS — Z34.82 ENCOUNTER FOR SUPERVISION OF OTHER NORMAL PREGNANCY, SECOND TRIMESTER: Primary | ICD-10-CM

## 2022-07-14 LAB
SL AMB  POCT GLUCOSE, UA: NEGATIVE
SL AMB POCT URINE PROTEIN: NEGATIVE

## 2022-07-14 PROCEDURE — 90471 IMMUNIZATION ADMIN: CPT

## 2022-07-14 PROCEDURE — PNV: Performed by: STUDENT IN AN ORGANIZED HEALTH CARE EDUCATION/TRAINING PROGRAM

## 2022-07-14 PROCEDURE — 90715 TDAP VACCINE 7 YRS/> IM: CPT

## 2022-07-14 NOTE — PROGRESS NOTES
Pt is here for routine ob visit   No concerns at this time  Urine neg/neg   No LOF,VB,Contractions  +FM   UTD covid vaccines   28wk labs completed/3hr glucose   Yellow folder given/reviewed w/ pt   Delivery consent signed today   Peds referral placed   Tdap vaccine given today/pt tolerated well   Breast pump ordered

## 2022-07-14 NOTE — PROGRESS NOTES
28 weeks gestation of pregnancy  33 yo  at 28+2 here for routine ob visit  Feeling well  No contractions, leaking or bleeding  Good fetal movement  Failed 1 hour, passed 3 hour  tdap today, breast pump today  Return 2 wks  The patient was counseled about the labor process  She was counseled regarding potential reasons that she may need a  section including arrest of dilation/descent, non-reassuring fetal status, or worsening maternal status  She was counseled on the risks of  including bleeding, infection, and injury to surrounding structures including the bowel and bladder  She was counseled that there are medical and surgical methods to manage excessive postpartum bleeding  She was counseled that in the event of excessive blood loss, she may require blood transfusion which includes a small risk of blood borne diseases such as hepatitis and HIV  The patient is OK with receiving a blood transfusion if necessary  The patient had an opportunity to ask questions and signed consent  She was counseled about the possible need for operative delivery using the vacuum or forceps and the indications for doing so  She was counseled that there is a small risk of  complications including intracranial hemorrhage, lacerations, nerve damage or fracture as well as the increased risk for more severe maternal laceration  The patient signed consent  Obesity affecting pregnancy  [facility] 11# goal 1120

## 2022-07-14 NOTE — ASSESSMENT & PLAN NOTE
31 yo  at 28+2 here for routine ob visit  Feeling well  No contractions, leaking or bleeding  Good fetal movement  Failed 1 hour, passed 3 hour  tdap today, breast pump today  Return 2 wks  The patient was counseled about the labor process  She was counseled regarding potential reasons that she may need a  section including arrest of dilation/descent, non-reassuring fetal status, or worsening maternal status  She was counseled on the risks of  including bleeding, infection, and injury to surrounding structures including the bowel and bladder  She was counseled that there are medical and surgical methods to manage excessive postpartum bleeding  She was counseled that in the event of excessive blood loss, she may require blood transfusion which includes a small risk of blood borne diseases such as hepatitis and HIV  The patient is OK with receiving a blood transfusion if necessary  The patient had an opportunity to ask questions and signed consent  She was counseled about the possible need for operative delivery using the vacuum or forceps and the indications for doing so  She was counseled that there is a small risk of  complications including intracranial hemorrhage, lacerations, nerve damage or fracture as well as the increased risk for more severe maternal laceration  The patient signed consent

## 2022-07-20 LAB
DME PARACHUTE DELIVERY DATE ACTUAL: NORMAL
DME PARACHUTE DELIVERY DATE EXPECTED: NORMAL
DME PARACHUTE DELIVERY DATE REQUESTED: NORMAL
DME PARACHUTE ITEM DESCRIPTION: NORMAL
DME PARACHUTE ITEM DESCRIPTION: NORMAL
DME PARACHUTE ORDER STATUS: NORMAL
DME PARACHUTE SUPPLIER NAME: NORMAL
DME PARACHUTE SUPPLIER PHONE: NORMAL

## 2022-07-27 ENCOUNTER — ROUTINE PRENATAL (OUTPATIENT)
Dept: OBGYN CLINIC | Facility: CLINIC | Age: 27
End: 2022-07-27

## 2022-07-27 VITALS — DIASTOLIC BLOOD PRESSURE: 78 MMHG | BODY MASS INDEX: 34.08 KG/M2 | SYSTOLIC BLOOD PRESSURE: 126 MMHG | WEIGHT: 204.8 LBS

## 2022-07-27 DIAGNOSIS — Z34.82 ENCOUNTER FOR SUPERVISION OF OTHER NORMAL PREGNANCY, SECOND TRIMESTER: ICD-10-CM

## 2022-07-27 DIAGNOSIS — Z34.02 FIRST PREGNANCY, SECOND TRIMESTER: Primary | ICD-10-CM

## 2022-07-27 DIAGNOSIS — O99.212 OBESITY AFFECTING PREGNANCY IN SECOND TRIMESTER: ICD-10-CM

## 2022-07-27 LAB
SL AMB  POCT GLUCOSE, UA: ABNORMAL
SL AMB POCT URINE PROTEIN: ABNORMAL

## 2022-07-27 PROCEDURE — PNV: Performed by: PHYSICIAN ASSISTANT

## 2022-07-27 NOTE — PROGRESS NOTES
Feels well, she has no complaints  Nl FM  Denies any bleeding or cramping   Pt has breast pump at home  S/p Tdap

## 2022-07-27 NOTE — PROGRESS NOTES
Problem List Items Addressed This Visit        Other    First pregnancy, second trimester     Feels well overall  Possibly some early fetal movement  First OB labs - normal  Gc/chlamydia - 3/23/22 neg  Pap - 6/8/21 neg  Blue folder - has  Genetic screening - Qnatal neg, MSAFP normal   It's a boy - Sonny      COVID vaccine - yes x 3   Level II normal   28 week labs - CBC, RPR normal  1hr gtt abnormal, 3hr gtt normal   Received TDAP   Has breast pump  Delivery consent previously signed    Maintained on aspirin 162mg po daily thru 36 weeks         Obesity affecting pregnancy     Recommended 11-20# TWG  Currently at 10#           Other Visit Diagnoses     Encounter for supervision of other normal pregnancy, second trimester    -  Primary    Relevant Orders    POCT urine dip

## 2022-07-27 NOTE — ASSESSMENT & PLAN NOTE
Feels well overall  Possibly some early fetal movement  First OB labs - normal  Gc/chlamydia - 3/23/22 neg  Pap - 6/8/21 neg  Blue folder - has  Genetic screening - Qnatal neg, MSAFP normal   It's a boy - Sonny      COVID vaccine - yes x 3   Level II normal   28 week labs - CBC, RPR normal  1hr gtt abnormal, 3hr gtt normal   Received TDAP   Has breast pump  Delivery consent previously signed    Maintained on aspirin 162mg po daily thru 36 weeks

## 2022-08-08 ENCOUNTER — ROUTINE PRENATAL (OUTPATIENT)
Dept: OBGYN CLINIC | Facility: CLINIC | Age: 27
End: 2022-08-08

## 2022-08-08 VITALS — SYSTOLIC BLOOD PRESSURE: 122 MMHG | BODY MASS INDEX: 34.45 KG/M2 | DIASTOLIC BLOOD PRESSURE: 82 MMHG | WEIGHT: 207 LBS

## 2022-08-08 DIAGNOSIS — Z3A.31 31 WEEKS GESTATION OF PREGNANCY: ICD-10-CM

## 2022-08-08 DIAGNOSIS — Z34.03 FIRST PREGNANCY, THIRD TRIMESTER: Primary | ICD-10-CM

## 2022-08-08 DIAGNOSIS — O99.213 OBESITY AFFECTING PREGNANCY IN THIRD TRIMESTER: ICD-10-CM

## 2022-08-08 LAB
SL AMB  POCT GLUCOSE, UA: NORMAL
SL AMB POCT URINE PROTEIN: NORMAL

## 2022-08-08 PROCEDURE — PNV: Performed by: NURSE PRACTITIONER

## 2022-08-08 NOTE — ASSESSMENT & PLAN NOTE
She is here with the FOB  She feels well  She denies LOF/CTX/VB  We discussed fetal kick counting  No concerns were voiced  Advised to increase fluids on these warmer days

## 2022-08-08 NOTE — PATIENT INSTRUCTIONS
Pregnancy at 31 to [address]:   What changes are happening with my body? You may continue to have symptoms such as shortness of breath, heartburn, contractions, or swelling of your ankles and feet  You may be gaining about 1 pound a week now  How do I care for myself at this stage of my pregnancy? Eat a variety of healthy foods  Healthy foods include fruits, vegetables, whole-grain breads, low-fat dairy foods, beans, lean meats, and fish  Drink liquids as directed  Ask how much liquid to drink each day and which liquids are best for you  Limit caffeine to less than 200 milligrams each day  Limit your intake of fish to 2 servings each week  Choose fish low in mercury such as canned light tuna, shrimp, salmon, cod, or tilapia  Do not  eat fish high in mercury such as swordfish, tilefish, chio mackerel, and shark  Manage heartburn  by eating 4 or 5 small meals each day instead of large meals  Avoid spicy food  Manage swelling  by lying down and putting your feet up  Take prenatal vitamins as directed  Your need for certain vitamins and minerals, such as folic acid, increases during pregnancy  Prenatal vitamins provide some of the extra vitamins and minerals you need  Prenatal vitamins may also help to decrease the risk of certain birth defects  Talk to your healthcare provider about exercise  Moderate exercise can help you stay fit  Your healthcare provider will help you plan an exercise program that is safe for you during pregnancy  Do not smoke  Smoking increases your risk of a miscarriage and other health problems during your pregnancy  Smoking can cause your baby to be born too early or weigh less at birth  Ask your healthcare provider for information if you need help quitting  Do not drink alcohol  Alcohol passes from your body to your baby through the placenta   It can affect your baby's brain development and cause fetal alcohol syndrome (FAS)  FAS is a group of conditions that causes mental, behavior, and growth problems  Talk to your healthcare provider before you take any medicines  Many medicines may harm your baby if you take them when you are pregnant  Do not take any medicines, vitamins, herbs, or supplements without first talking to your healthcare provider  Never use illegal or street drugs (such as marijuana or cocaine) while you are pregnant  What are some safety tips during pregnancy? Avoid hot tubs and saunas  Do not use a hot tub or sauna while you are pregnant, especially during your first trimester  Hot tubs and saunas may raise your baby's temperature and increase the risk of birth defects  Avoid toxoplasmosis  This is an infection caused by eating raw meat or being around infected cat feces  It can cause birth defects, miscarriages, and other problems  Wash your hands after you touch raw meat  Make sure any meat is well-cooked before you eat it  Avoid raw eggs and unpasteurized milk  Use gloves or ask someone else to clean your cat's litter box while you are pregnant  What changes are happening with my baby? By 34 weeks, your baby may weigh more than 5 pounds  Your baby will be about 12 ½ inches long from the top of the head to the rump (baby's bottom)  Your baby is gaining about ½ pound a week  Your baby's eyes open and close now  Your baby's kicks and movements are more forceful at this time  What do I need to know about prenatal care? Your healthcare provider will check your blood pressure and weight  You may also need the following:  A urine test  may also be done to check for sugar and protein  These can be signs of gestational diabetes or infection  Protein in your urine may also be a sign of preeclampsia  Preeclampsia is a condition that can develop during week 20 or later of your pregnancy  It causes high blood pressure, and it can cause problems with your kidneys and other organs      A gestational diabetes screen  may be done  Your healthcare provider may order either a 1-step or 2-step oral glucose tolerance test (OGTT)  1-step OGTT:  Your blood sugar level will be tested after you have not eaten for 8 hours (fasting)  You will then be given a glucose drink  Your level will be tested again 1 hour and 2 hours after you finish the drink  2-step OGTT:  You do not have to fast for the first part of the test  You will have the glucose drink at any time of day  Your blood sugar level will be checked 1 hour later  If your blood sugar is higher than a certain level, another test will be ordered  You will fast and your blood sugar level will be tested  You will have the glucose drink  Your blood will be tested again 1 hour, 2 hours, and 3 hours after you finish the glucose drink  A Tdap vaccine  may be recommended by your healthcare provider  Fundal height  is a measurement of your uterus to check your baby's growth  This number is usually the same as the number of weeks that you have been pregnant  Your healthcare provider may also check your baby's position  Your baby's heart rate  will be checked  When should I seek immediate care? You develop a severe headache that does not go away  You have new or increased vision changes, such as blurred or spotted vision  You have new or increased swelling in your face or hands  You have vaginal spotting or bleeding  Your water broke or you feel warm water gushing or trickling from your vagina  When should I call my obstetrician? You have more than 5 contractions in 1 hour  You notice any changes in your baby's movements  You have abdominal cramps, pressure, or tightening  You have a change in vaginal discharge  You have chills or a fever  You have vaginal itching, burning, or pain  You have yellow, green, white, or foul-smelling vaginal discharge      You have pain or burning when you urinate, less urine than usual, or pink or bloody urine  You have questions or concerns about your condition or care  CARE AGREEMENT:   You have the right to help plan your care  Learn about your health condition and how it may be treated  Discuss treatment options with your healthcare providers to decide what care you want to receive  You always have the right to refuse treatment  The above information is an  only  It is not intended as medical advice for individual conditions or treatments  Talk to your doctor, nurse or pharmacist before following any medical regimen to see if it is safe and effective for you  © Copyright [employer] 2022 Information is for End User's use only and may not be sold, redistributed or otherwise used for commercial purposes   All illustrations and images included in CareNotes® are the copyrighted property of A D A M , Inc  or [address] [place] [address]

## 2022-08-08 NOTE — PROGRESS NOTES
Problem   31 Weeks Gestation of Pregnancy    First OB labs - normal  Gc/chlamydia - 3/23/22 neg  Pap - 21 neg  Blue folder - has  Genetic screening - Qnatal neg, MSAFP normal   It's a boy - Sonny      COVID vaccine - yes x 3   Level II normal   28 week labs - CBC, RPR normal  1hr gtt abnormal, 3hr gtt normal   Received TDAP   Has breast pump  Delivery consent previously signed     Maintained on aspirin 162mg po daily thru 36 weeks  32 week FG appt on 8/10   kg (13 lb)        First Pregnancy, Third Trimester     31 weeks gestation of pregnancy  She is here with the FOB  She feels well  She denies LOF/CTX/VB  We discussed fetal kick counting  No concerns were voiced  Advised to increase fluids on these warmer days

## 2022-08-10 ENCOUNTER — ULTRASOUND (OUTPATIENT)
Dept: PERINATAL CARE | Facility: OTHER | Age: 27
End: 2022-08-10
Payer: COMMERCIAL

## 2022-08-10 VITALS
BODY MASS INDEX: 34.39 KG/M2 | WEIGHT: 206.4 LBS | HEIGHT: 65 IN | DIASTOLIC BLOOD PRESSURE: 76 MMHG | HEART RATE: 110 BPM | SYSTOLIC BLOOD PRESSURE: 121 MMHG

## 2022-08-10 DIAGNOSIS — Z3A.32 32 WEEKS GESTATION OF PREGNANCY: Primary | ICD-10-CM

## 2022-08-10 DIAGNOSIS — O36.60X0 EXCESSIVE FETAL GROWTH, ANTEPARTUM, SINGLE OR UNSPECIFIED FETUS: ICD-10-CM

## 2022-08-10 DIAGNOSIS — O99.213 OBESITY AFFECTING PREGNANCY IN THIRD TRIMESTER: ICD-10-CM

## 2022-08-10 PROCEDURE — 99213 OFFICE O/P EST LOW 20 MIN: CPT | Performed by: OBSTETRICS & GYNECOLOGY

## 2022-08-10 PROCEDURE — 76816 OB US FOLLOW-UP PER FETUS: CPT | Performed by: OBSTETRICS & GYNECOLOGY

## 2022-08-10 NOTE — LETTER
August 10, 2022     Diane Headings, [address] [facility]  [address] [id number]    Patient: Filipe Vela   YOB: 1995   Date of Visit: 8/10/2022       Dear Dr Luis Alberto Monterroso: Thank you for referring Filiep Vela to me for evaluation  Below are my notes for this consultation  If you have questions, please do not hesitate to call me  I look forward to following your patient along with you  Sincerely,        Sheyla Salcido MD        CC: No Recipients  Sheyla Salcido MD  8/10/2022 11:35 AM  Sign when Signing Visit  A fetal ultrasound was completed  See Ob procedures in Epic for an interpretation and recommendations  Do not hesitate to contact us in [facility] if you have questions  Emily Renteria MD, [address] [place] [address]  Maternal Fetal Medicine

## 2022-08-12 ENCOUNTER — TELEPHONE (OUTPATIENT)
Dept: OBGYN CLINIC | Facility: CLINIC | Age: 27
End: 2022-08-12

## 2022-08-12 ENCOUNTER — ROUTINE PRENATAL (OUTPATIENT)
Dept: OBGYN CLINIC | Facility: MEDICAL CENTER | Age: 27
End: 2022-08-12

## 2022-08-12 VITALS
HEIGHT: 65 IN | DIASTOLIC BLOOD PRESSURE: 78 MMHG | WEIGHT: 206.6 LBS | SYSTOLIC BLOOD PRESSURE: 118 MMHG | BODY MASS INDEX: 34.42 KG/M2

## 2022-08-12 DIAGNOSIS — O36.60X0 EXCESSIVE FETAL GROWTH, ANTEPARTUM, SINGLE OR UNSPECIFIED FETUS: ICD-10-CM

## 2022-08-12 DIAGNOSIS — Z34.03 PRENATAL CARE, FIRST PREGNANCY, THIRD TRIMESTER: Primary | ICD-10-CM

## 2022-08-12 DIAGNOSIS — O99.213 OBESITY AFFECTING PREGNANCY IN THIRD TRIMESTER: Primary | ICD-10-CM

## 2022-08-12 LAB
SL AMB  POCT GLUCOSE, UA: ABNORMAL
SL AMB POCT URINE PROTEIN: ABNORMAL

## 2022-08-12 PROCEDURE — PNV: Performed by: STUDENT IN AN ORGANIZED HEALTH CARE EDUCATION/TRAINING PROGRAM

## 2022-08-12 NOTE — TELEPHONE ENCOUNTER
----- Message from Shruthi Cooper MD sent at 8/11/2022  9:05 PM EDT -----    Please order nutrition referral per Dr Nadiya Vogt and notify Norma Levine   Thank you

## 2022-08-12 NOTE — PROGRESS NOTES
Mendoza Boston is a 33 yo  at 28 3/7 wga - presents for follow up discussion regarding suspected macrosomic fetus  On 8/10 EFW 2532 g, > 97%  AC > 97%  Mendoza Boston failed 1 hr glucola with 4/4 normal on 3 hr gtt  Her weight gain so far is 12# which is within recommend range for her starting BMI of 32  Mendoza Boston reports she was a 7 lb baby, her  was 10 lb baby  They left MFM appt unclear of what direction to go with treatment  We discussed limitations of ultrasound in general  We discussed possible etiologies of macrosomia including undiagnosed diabetes and genetics  Per MFM, possible undiagnosed insulin resistance and recommend meeting with nutrition and following diabetic diet  Pt and her  questioned whether repeat 3 hr gtt would be useful which we discussed could help rule out or confirm diabetes diet  Sometimes GDM can present later in pregnancy  They are very agreeable to repeating  In the mean time will schedule nutrition appt  Discussed delivery options depending on 36 wk growth  If non-diabetic and projected EFW about 5000 g may recommend   Can consider IOL at 39 wks  Discussed risks of abnormal labor as well as shoulder dystocia  Pt and  very agreeable to whatever is safest for baby  At this time plan to:  1  Complete 3 hr gtt tomorrow  2  Establish with nutrition  3  Discuss delivery planning after 36 wk ultrasound  Going [place] vacation to the beach next week  F/u in 2 wks as scheduled

## 2022-08-12 NOTE — PROGRESS NOTES
PNV 32+3  G1P  Patient denies any lof, vb or ctx  Patient has +fm    Patient urine is neg glu / trace pro   97th percentile fetus   28 week labs completed  Delivery consent is signed  UTD on Tdap vaccine

## 2022-08-13 ENCOUNTER — APPOINTMENT (OUTPATIENT)
Dept: LAB | Facility: CLINIC | Age: 27
End: 2022-08-13
Payer: COMMERCIAL

## 2022-08-13 DIAGNOSIS — O36.60X0 EXCESSIVE FETAL GROWTH, ANTEPARTUM, SINGLE OR UNSPECIFIED FETUS: ICD-10-CM

## 2022-08-13 LAB
GLUCOSE 1H P 100 G GLC PO SERPL-MCNC: 158 MG/DL (ref 70–183)
GLUCOSE 2H P 100 G GLC PO SERPL-MCNC: 153 MG/DL (ref 70–155)
GLUCOSE 3H P 100 G GLC PO SERPL-MCNC: 104 MG/DL (ref 70–140)
GLUCOSE P FAST SERPL-MCNC: 86 MG/DL (ref 65–94)

## 2022-08-13 PROCEDURE — 82952 GTT-ADDED SAMPLES: CPT

## 2022-08-13 PROCEDURE — 82951 GLUCOSE TOLERANCE TEST (GTT): CPT

## 2022-08-13 PROCEDURE — 36415 COLL VENOUS BLD VENIPUNCTURE: CPT

## 2022-08-22 ENCOUNTER — HOSPITAL ENCOUNTER (OUTPATIENT)
Facility: HOSPITAL | Age: 27
Discharge: HOME | End: 2022-08-22
Attending: OBSTETRICS & GYNECOLOGY | Admitting: OBSTETRICS & GYNECOLOGY
Payer: COMMERCIAL

## 2022-08-22 ENCOUNTER — TELEPHONE (OUTPATIENT)
Dept: OBGYN CLINIC | Facility: CLINIC | Age: 27
End: 2022-08-22

## 2022-08-22 VITALS
DIASTOLIC BLOOD PRESSURE: 73 MMHG | SYSTOLIC BLOOD PRESSURE: 121 MMHG | TEMPERATURE: 97.8 F | HEART RATE: 121 BPM | RESPIRATION RATE: 18 BRPM

## 2022-08-22 PROCEDURE — 99213 OFFICE O/P EST LOW 20 MIN: CPT | Performed by: OBSTETRICS & GYNECOLOGY

## 2022-08-22 PROCEDURE — 76817 TRANSVAGINAL US OBSTETRIC: CPT | Performed by: OBSTETRICS & GYNECOLOGY

## 2022-08-22 PROCEDURE — 76817 TRANSVAGINAL US OBSTETRIC: CPT

## 2022-08-22 PROCEDURE — 99213 OFFICE O/P EST LOW 20 MIN: CPT

## 2022-08-22 NOTE — TELEPHONE ENCOUNTER
Per Dr Yolanda Salcedo, patient to be evaluated in L&D  Patient verbalizes understanding  Dr Mike Hodges, L&D notified

## 2022-08-22 NOTE — PROCEDURES
Tyron Boxer, danielle  at 33w6d with an SVITLANA of 10/4/2022, by Last Menstrual Period, was seen at [address] for the following procedure(s): $Procedure Type: US - Transvaginal]                   Ultrasound Other  Fetal Presentation: Vertex  Cervical Length: 3 09  Funnel: No  Debris: No  Placenta Previa: No             Ultrasound Probe Disinfection    A transvaginal ultrasound was performed     Prior to use, disinfection was performed with High Level Disinfection Process (Trophon)      Hyun Elizabeth MD  22  11:59 AM

## 2022-08-22 NOTE — TELEPHONE ENCOUNTER
Patient fell on Saturday  Landed on hands and knees  Did not hit belly  Denies lof,contractions  Good fetal movement  Small amount of red blood    B positive  Routed to provider to advise  Office visit? Monitor?

## 2022-08-22 NOTE — PROGRESS NOTES
L&D Triage Note - OB/GYN  Sanaz Noonan 32 y o  female MRN: [id number]  Unit/Bed#: LD TRIAGE  Encounter: [id number]      ASSESSMENT:    Sanaz Noonan is a 32 y o  Adalottie Tanner at 33w6d  who was evaluated today in triage to rule out  labor  Due to the reassuring work up described below, the patient does not appear to be in  labor and it is safe to send her home  PLAN:    1) Speculum Exam  2) Cervical Length via Transvaginal Ultrasound  3) SVE  4) Continue routine prenatal care  5) Discharge from [facility] triage with  labor precautions    - Reviewed rupture of membranes, false vs true labor, decreased fetal movement, and vaginal bleeding   - Pt to call provider with any concerns and follow up at her next scheduled prenatal appointment    - Case discussed with Dr Lito Heart:    Sanaz Noonan 32 y o  Ada Tanner at [address] [facility] with an Estimated Date of Delivery: 10/4/22 who presented today to triage after a fall on Saturday night  Patient states that she tripped and fell but caught herself on hands and knees, she did not strike her belly  Baby has been moving as normal   But this morning she noticed bright red blood on a panty liner  This pregnancy has been complicated by excessive fetal growth  At last [facility] appointment baby was at the 97th percentile       Contractions:  Denies  Leakage of fluid:  Denies  Vaginal Bleeding:  Yes  Fetal movement: present    OBJECTIVE:    Vitals:    22 1015   BP: 121/73   Pulse: (!) 121   Resp: 18   Temp: 97 8 °F (36 6 °C)       ROS:  Constitutional: Negative  Respiratory: Negative  Cardiovascular: Negative    Gastrointestinal: Negative    General Physical Exam:  General: in no apparent distress and alert  Cardiovascular: Cor RRR  Lungs: non-labored breathing  Abdomen: abdomen is soft without significant tenderness, masses, organomegaly or guarding  Lower extremeties: nontender      Fetal monitoring:  FHT:  130 bpm/ Moderate 6 - 25 bpm / 15 x 15 accelerations present, no decelerations  [place]: contractions occasionally present        Cervical Exam  Speculum: Cervical os is closed   Increased erethema notes    KOH/WTMT:     Infection:   - no clue cells    - no hyphae   - no trichomonads present    Membrane status   - negative ferning   - negative nitrazene   - negative pooling       SVE:Closed/thick/high    Imaging:       TVUS   - Cervical length    - 3 07cm    - 3 09cm    - 3 11cm   - Presentation: vertex          Chemo Reddy MD,  OBGYN PGY-1  8/22/2022 10:54 AM

## 2022-08-22 NOTE — TELEPHONE ENCOUNTER
Nataliya Newsome had a fall Sat  Evening and landed on all fours but today she has some blood on a panty liner  Please advise

## 2022-08-23 ENCOUNTER — CLINICAL SUPPORT (OUTPATIENT)
Dept: NUTRITION | Facility: CLINIC | Age: 27
End: 2022-08-23
Payer: COMMERCIAL

## 2022-08-23 VITALS — BODY MASS INDEX: 34.51 KG/M2 | WEIGHT: 207.4 LBS

## 2022-08-23 DIAGNOSIS — O36.60X0 EXCESSIVE FETAL GROWTH, ANTEPARTUM, SINGLE OR UNSPECIFIED FETUS: ICD-10-CM

## 2022-08-23 PROCEDURE — 97802 MEDICAL NUTRITION INDIV IN: CPT | Performed by: DIETITIAN, REGISTERED

## 2022-08-23 NOTE — PROGRESS NOTES
Initial Nutrition Assessment Form    Patient Name: Brenda Petersen    YOB: 1995    Sex: Female     Assessment Date: 2022  Start Time: 3 PM Stop Time: 3:45 Total Minutes: 45 min     Data:  Present at session: Self and  Jillian Chong   Parent/Patient Concerns: "We want to make sure diet isn't affecting the large growth of our baby "   Medical Dx/Reason for Referral: Excessive fetal growth   No past medical history on file     Current Outpatient Medications   Medication Sig Dispense Refill    aspirin 81 mg chewable tablet Chew 2 tablets (162 mg total) daily 180 tablet 0    [id number] (PRENA1 PO) Take by mouth       No current facility-administered medications for this visit  Additional Meds/Supplements: n/a   Special Learning Needs:    Height: HC Readings from Last 5 Encounters:   No data found for [facility]       Weight: Wt Readings from Last 10 Encounters:   22 93 7 kg (206 lb 9 6 oz)   08/10/22 93 6 kg (206 lb 6 4 oz)   22 93 9 kg (207 lb)   22 92 9 kg (204 lb 12 8 oz)   22 93 2 kg (205 lb 6 4 oz)   22 91 1 kg (200 lb 12 8 oz)   22 92 2 kg (203 lb 3 2 oz)   22 91 4 kg (201 lb 6 4 oz)   22 89 8 kg (198 lb)   22 90 4 kg (199 lb 3 2 oz)     Estimated body mass index is 34 38 kg/m² as calculated from the following:    Height as of 22: 5' 5" (1 651 m)  Weight as of 22: 93 7 kg (206 lb 9 6 oz)  Recent Weight Change: [x]Yes     []No  Amount: Weight gain during pregnancy, starting weight of 194lb pre-pregnancy  Energy Needs: [address] [facility] Equation: 2500 kcal (mifflin x 1 2 activity factor + 452 kcal for 3rd trimester of pregnancy)  94 grams PRO (1 g/kg)   No Known Allergies    Social History     Substance and Sexual Activity   Alcohol Use Not Currently    Comment: socially     none   Social History     Tobacco Use   Smoking Status Never Smoker   Smokeless Tobacco Never Used    none   Who shops?  spouse   Who cooks? spouse   Exercise: 20-30 min walking with  dogs, about 2 times a week   Prior Counseling? []Yes     [x]No  When:      Why:         Diet Hx:  Breakfast: Diet:  Bowl of cheerios with 4 oz whole milk 1 5 cups or 1 slice toast with peanut butter   Lunch: Leftovers from night before such as turkey club on whole wheat toast         Dinner: Bowl of cereal or wheat toast or creamy mustard chicken thighs or leon salad with turkey breast or chicken bruschetta with pasta about 1 1/4 cups         Snacks: AM -   PM -   HS - ghiradelli chocolate chips or dove dark chocolate 2 pieces, occasionally bowl of ice cream  Like sweets occasionally  Really does not snack much in between meals  Mostly drinks water "9 out of 10 x", may have chocolate milk as a sweet, sometimes craves lemonade though only 12 oz  Was recommended to drink electrolytes during hot summer months  Pt is  34w2/7d GA  Was told by MFM that fetus is in > 97% for weight  Pt had completed 3 h GTT which came back normal  Was instructed to follow diabetic type diet to help prevent macrosomic fetus at birth  Is noted that her  was nearly 10lb when he was born and per GYN note may be genetic component with pt's child  Pt does report that she is very hungry at nighttime "but I told myself that I shouldn't eat after 7 PM" and so she doesn't eat bedtime snack  Also noted reflux if she ate too close to bed  Reports no issues with N/V at this time though does get full relatively quickly  Pt listed several vegetables that she doesn't like, seems to only enjoy raw vegetables in a salad or with dip/peanut butter  Also does not eat much of any fruit  Minimal activity at this time apart from ADLs  Nutrition Diagnosis:   Overweight/obesity  related to Physical inactivity, limited vegetable intake as evidenced by  BMI more than normative standard for age and sex (obesity-grade I 26-30  9)       Medical Nutrition Therapy Intervention:  [x]Individualized Meal Plan--Encouraged intake of nonstarchy vegetables such as salad or carrot sticks/celery with peanut butter as vegetables are pretty lacking in pt's diet  Use of overnight oats at breakfast to help prevent excessive hunger throughout the day  Other snack options such as [place] Yogurt Oikos Triple Zero with nuts or skinny dipped almonds  Fairlife chocolate milk to reduce added sugars from regular chocolate milk and increase protein  Pt's intake overall actually appears somewhat low, would not recommend eating less  Aim for balanced meals, increase fiber, continued carb control  []Understanding Lab Values   []Basic Pathophysiology of Disease []Food/Medication Interactions   []Food Diary [x]Exercise--Encouraged walking regularly to promote BMs and continued appropriate weight gain  []Lifestyle/Behavior Modification Techniques []Medication, Mechanism of Action   []Label Reading []Self Blood Glucose Monitoring   [x]Weight/BMI Goals--Would aim between 11-20lb total weight gain for pre-pregnancy BMI of 32  Pt currently at 13lb weight gain, currently within range  Other:  Handouts provided:  Healthy Pregnancy Tips  Pregnancy Nutrition Therapy  Gestational Diabetes Nutrition Therapy (for carb control)    No follow up appointment at this time as pt is near end of her pregnancy  Welcome to call if she has any questions  Could have subsequent follow up postpartum if desired, telehealth is option if desired, noting pt traveling from \[facility]\""  Comprehension: []Excellent  [x]Very Good  []Good  []Fair   []Poor    Receptivity: []Excellent  [x]Very Good  []Good  []Fair   []Poor    Expected Compliance: []Excellent  [x]Very Good  []Good  []Fair   []Poor        Goals:  1  Pt to stay within 11-20lb weight gain during duration of pregnancy  2    3        No follow-ups on file    Labs:  CMP  Lab Results   Component Value Date    GLUF 86 08/13/2022       BMP  No results found for: GLUCOSE, CALCIUM, NA, K, CO2, CL, BUN, CREATININE    Lipids  No results found for: CHOL  No results found for: HDL  No results found for: LDLCALC  No results found for: TRIG  No results found for: CHOLHDL    Hemoglobin A1C  No results found for: HGBA1C    Fasting Glucose  Lab Results   Component Value Date    GLUF 86 08/13/2022       Insulin     Thyroid  No results found for: TSH, I6NPIYR, H2NOJCQ, THYROIDAB    Hepatic Function Panel  No results found for: ALT, AST, GGT, ALKPHOS, BILITOT    Celiac Disease Antibody Panel  No results found for: ENDOMYSIAL IGA, GLIADIN IGA, GLIADIN IGG, IGA, TISSUE TRANSGLUT AB, TTG IGA   Iron  No results found for: IRON, TIBC, [address], LDN  [address] [address]

## 2022-08-29 ENCOUNTER — ROUTINE PRENATAL (OUTPATIENT)
Dept: OBGYN CLINIC | Facility: CLINIC | Age: 27
End: 2022-08-29

## 2022-08-29 VITALS
SYSTOLIC BLOOD PRESSURE: 138 MMHG | WEIGHT: 207.2 LBS | HEIGHT: 65 IN | DIASTOLIC BLOOD PRESSURE: 78 MMHG | BODY MASS INDEX: 34.52 KG/M2

## 2022-08-29 DIAGNOSIS — Z34.03 FIRST PREGNANCY, THIRD TRIMESTER: Primary | ICD-10-CM

## 2022-08-29 DIAGNOSIS — Z3A.34 34 WEEKS GESTATION OF PREGNANCY: ICD-10-CM

## 2022-08-29 LAB
SL AMB  POCT GLUCOSE, UA: 1
SL AMB POCT URINE PROTEIN: NEGATIVE

## 2022-08-29 PROCEDURE — PNV: Performed by: OBSTETRICS & GYNECOLOGY

## 2022-08-29 NOTE — PROGRESS NOTES
Good FM  Occasional BH's  No VB  Seen in L&D for hemorrhoidal bleeding - nothing further  Concerned re:  Vaginal discharge  More volume and thinner  No odor or discharge  SSE:  Scant normal leukorrrhea, no pooling  Cervix appears closed  Labor precautions reviewed  RTO 2 weeks - GBBS collection  Has growth sono scheduled

## 2022-08-30 ENCOUNTER — TELEPHONE (OUTPATIENT)
Dept: OBGYN CLINIC | Facility: CLINIC | Age: 27
End: 2022-08-30

## 2022-08-30 NOTE — TELEPHONE ENCOUNTER
This pt was summons for [employer] on 10/25 but her due date is 10/4  She is asking for a note to excuse her from having to attend to that hearing  They also mentioned there are other dates in November but she still does not feel comfortable going that far along in her pregnancy  The court said all she needs is a note to excuse her   She provided the fax number: [phone] (Attention to Earl Beck)

## 2022-08-30 NOTE — TELEPHONE ENCOUNTER
Yes - note should say that she is due on that date and will not be allowed to participate in jury duty for at least 6 weeks postpartum

## 2022-09-01 ENCOUNTER — TELEPHONE (OUTPATIENT)
Dept: PEDIATRICS CLINIC | Facility: CLINIC | Age: 27
End: 2022-09-01

## 2022-09-01 NOTE — TELEPHONE ENCOUNTER
LVM for patient to give me a call back as soon as possible to help choose a Pediatrician for when baby is born

## 2022-09-06 ENCOUNTER — TELEPHONE (OUTPATIENT)
Dept: PERINATAL CARE | Facility: CLINIC | Age: 27
End: 2022-09-06

## 2022-09-07 PROBLEM — O36.63X0 FETAL MACROSOMIA IN PREGNANCY IN THIRD TRIMESTER: Status: ACTIVE | Noted: 2022-09-07

## 2022-09-07 NOTE — PATIENT INSTRUCTIONS
Thank you for choosing us for your  care today  If you have any questions about your ultrasound or care, please do not hesitate to contact us or your primary obstetrician  Some general instructions for your pregnancy are:    Protect against coronavirus: get vaccinated - pregnant women are increased risk of severe COVID  Notify your primary care doctor if you have any symptoms  Exercise: Aim for 22 minutes per day (150 minutes per week) of regular exercise  Walking is great! Nutrition: aim for calcium-rich and iron-rich foods as well as healthy sources of protein  Learn about Preeclampsia: preeclampsia is a common, serious high blood pressure complication in pregnancy  A blood pressure of 396QNJM (systolic or top number) or 67BERD (diastolic or bottom number) is not normal and needs evaluation by your doctor  Aspirin is sometimes prescribed in early pregnancy to prevent preeclampsia in women with risk factors - ask your obstetrician if you should be on this medication  If you smoke, try to reduce how many cigarettes you smoke or try to quit completely  Do not vape  Other warning signs to watch out for in pregnancy or postpartum: chest pain, obstructed breathing or shortness of breath, seizures, thoughts of hurting yourself or your baby, bleeding, a painful or swollen leg, fever, or headache (see [facility] POST-BIRTH Warning Signs campaign)  If these happen call 911  Itching is also not normal in pregnancy and if you experience this, especially over your hands and feet, potentially worse at night, notify your doctors

## 2022-09-08 ENCOUNTER — ULTRASOUND (OUTPATIENT)
Dept: PERINATAL CARE | Facility: CLINIC | Age: 27
End: 2022-09-08
Payer: COMMERCIAL

## 2022-09-08 ENCOUNTER — ROUTINE PRENATAL (OUTPATIENT)
Dept: OBGYN CLINIC | Facility: CLINIC | Age: 27
End: 2022-09-08

## 2022-09-08 VITALS
HEART RATE: 112 BPM | SYSTOLIC BLOOD PRESSURE: 112 MMHG | WEIGHT: 208 LBS | DIASTOLIC BLOOD PRESSURE: 80 MMHG | HEIGHT: 65 IN | BODY MASS INDEX: 34.66 KG/M2

## 2022-09-08 VITALS — WEIGHT: 208 LBS | BODY MASS INDEX: 34.61 KG/M2 | DIASTOLIC BLOOD PRESSURE: 80 MMHG | SYSTOLIC BLOOD PRESSURE: 120 MMHG

## 2022-09-08 DIAGNOSIS — Z34.03 FIRST PREGNANCY, THIRD TRIMESTER: Primary | ICD-10-CM

## 2022-09-08 DIAGNOSIS — Z3A.36 36 WEEKS GESTATION OF PREGNANCY: ICD-10-CM

## 2022-09-08 DIAGNOSIS — O99.213 OBESITY AFFECTING PREGNANCY IN THIRD TRIMESTER: Primary | ICD-10-CM

## 2022-09-08 DIAGNOSIS — Z36.89 ENCOUNTER FOR ULTRASOUND TO ASSESS FETAL GROWTH: ICD-10-CM

## 2022-09-08 DIAGNOSIS — O36.63X0 FETAL MACROSOMIA DURING PREGNANCY IN THIRD TRIMESTER, SINGLE OR UNSPECIFIED FETUS: ICD-10-CM

## 2022-09-08 LAB
SL AMB  POCT GLUCOSE, UA: ABNORMAL
SL AMB POCT URINE PROTEIN: ABNORMAL

## 2022-09-08 PROCEDURE — 87150 DNA/RNA AMPLIFIED PROBE: CPT | Performed by: OBSTETRICS & GYNECOLOGY

## 2022-09-08 PROCEDURE — PNV: Performed by: OBSTETRICS & GYNECOLOGY

## 2022-09-08 PROCEDURE — 76816 OB US FOLLOW-UP PER FETUS: CPT | Performed by: NURSE PRACTITIONER

## 2022-09-08 PROCEDURE — 99213 OFFICE O/P EST LOW 20 MIN: CPT | Performed by: NURSE PRACTITIONER

## 2022-09-08 PROCEDURE — 76816 OB US FOLLOW-UP PER FETUS: CPT | Performed by: OBSTETRICS & GYNECOLOGY

## 2022-09-08 NOTE — LETTER
September 8, 2022     Irma Noguera, [address] [facility] [address] [facility]  [address] [address]    Patient: Aparna Gomez   YOB: 1995   Date of Visit: 9/8/2022       Dear Dr Amanda Husain: Thank you for referring Aparna Gomez to me for evaluation  Below are my notes for this consultation  If you have questions, please do not hesitate to call me  I look forward to following your patient along with you  Sincerely,        Marquis Kelly MD        CC: MD Marquis Kelly Plaza MD  9/8/2022  8:10 PM  Sign when Signing Visit  Aparna Gomez has no complaints today  She reports regular fetal movements and does not report any problems  She is here today at [facility] for evaluation of fetal weight and amniotic fluid due to fetal macrosomia noted on prior ultrasound  She had a repeat 3 hour glucose tolerance test on 08/13 that was normal (4/4 values)  She reports a weight gain of 13 lb in this pregnancy  She reports that her partner was  over 9 lb at birth  Problem list:  1  Fetal macrosomia  2  Obesity     Ultrasound findings:  A viable hoffmann intrauterine pregnancy is seen  Estimated fetal weight indicates fetal macrosomia (>90th percentile-weight 3852 grams) and amniotic fluid is within normal limits for gestational age  No fetal anomalies are visualized on limited survey  Placenta is within normal limits  Pregnancy ultrasound has limitations and is unable to detect all forms of fetal congenital abnormalities  The inaccuracy in the EFW can be off by 1 lb either way in the third trimester  Specific counseling was provided on the following problems:  1  A macrosomic symmetric fetus is noted on todays scan  I reviewed the inaccuracy of an EFW in the third trimester which can be off by 1 lb either way  Macrosomic babies can have an increased risk of shoulder dystocia   The size of a baby over 4000, 4250, 4500, 4750 gm in a nondiabetic can increase her risk for a fetal shoulder dystocia to around 5 2, 9 1, 14 3, 21 1% respectively  Most of the deliveries diagnosed with a shoulder dystocia can be resolved by the different maneuvers that her provider is trained to do  There are some cases though that will be unable to be resolved without some danger to the fetus such as hypoxemia, neurologic damage, fetal death or  nerve injury to an arm ect  ACOG recommends a  section in a mother who has no evidence for diabetes and her fetus has an estimated fetal weight of 5000 gms or more  Follow up recommended:  No further ultrasounds are recommended at this time  Would consider induction at 39 weeks to limit further fetal growth  Other option also includes elective CS after 39 weeks  Fetal kick counting was recommended  Routine prenatal care is advised  Please refer her back to our office as clinically indicated  Pre visit time reviewing her records 5 minutes  Face to face time 10 minutes  Post visit time on documentation of note, updating her problem list, adding orders and prescriptions 5 minutes  Procedures that were completed today were charged separately  The level of decision making was moderate    Teddy BOLIVAR     I supervised the Advanced Practitioner, and we discussed the care of this patient after she saw the patient independently while I was present in the office  I reviewed the note and her ultrasound pictures and agree        Chris Lagunas MD

## 2022-09-08 NOTE — PROGRESS NOTES
Akua eMsa has no complaints today  She reports regular fetal movements and does not report any problems  She is here today at [facility] for evaluation of fetal weight and amniotic fluid due to fetal macrosomia noted on prior ultrasound  She had a repeat 3 hour glucose tolerance test on  that was normal (4/4 values)  She reports a weight gain of 13 lb in this pregnancy  She reports that her partner was  over 9 lb at birth  Problem list:  1  Fetal macrosomia  2  Obesity     Ultrasound findings:  A viable hoffmann intrauterine pregnancy is seen  Estimated fetal weight indicates fetal macrosomia (>90th percentile-weight 3852 grams) and amniotic fluid is within normal limits for gestational age  No fetal anomalies are visualized on limited survey  Placenta is within normal limits  Pregnancy ultrasound has limitations and is unable to detect all forms of fetal congenital abnormalities  The inaccuracy in the EFW can be off by 1 lb either way in the third trimester  Specific counseling was provided on the following problems:  1  A macrosomic symmetric fetus is noted on todays scan  I reviewed the inaccuracy of an EFW in the third trimester which can be off by 1 lb either way  Macrosomic babies can have an increased risk of shoulder dystocia  The size of a baby over 4000, 4250, 4500, 4750 gm in a nondiabetic can increase her risk for a fetal shoulder dystocia to around 5 2, 9 1, 14 3, 21 1% respectively  Most of the deliveries diagnosed with a shoulder dystocia can be resolved by the different maneuvers that her provider is trained to do  There are some cases though that will be unable to be resolved without some danger to the fetus such as hypoxemia, neurologic damage, fetal death or  nerve injury to an arm ect  ACOG recommends a  section in a mother who has no evidence for diabetes and her fetus has an estimated fetal weight of 5000 gms or more       Follow up recommended:  No further ultrasounds are recommended at this time  Would consider induction at 39 weeks to limit further fetal growth  Other option also includes elective CS after 39 weeks  Fetal kick counting was recommended  Routine prenatal care is advised  Please refer her back to our office as clinically indicated  Pre visit time reviewing her records 5 minutes  Face to face time 10 minutes  Post visit time on documentation of note, updating her problem list, adding orders and prescriptions 5 minutes  Procedures that were completed today were charged separately  The level of decision making was moderate    Lane BOLIVAR     I supervised the Advanced Practitioner, and we discussed the care of this patient after she saw the patient independently while I was present in the office  I reviewed the note and her ultrasound pictures and agree        Alber Dumont MD

## 2022-09-08 NOTE — PROGRESS NOTES
PNV  36w2d    Patient denies any lof, vb or ctx  Patient has +fm  Lots of discharge  Patient urine is   Patient has no concerns today

## 2022-09-10 LAB — GP B STREP DNA SPEC QL NAA+PROBE: NEGATIVE

## 2022-09-14 ENCOUNTER — ROUTINE PRENATAL (OUTPATIENT)
Dept: OBGYN CLINIC | Facility: CLINIC | Age: 27
End: 2022-09-14

## 2022-09-14 VITALS — WEIGHT: 208.8 LBS | DIASTOLIC BLOOD PRESSURE: 76 MMHG | SYSTOLIC BLOOD PRESSURE: 122 MMHG | BODY MASS INDEX: 34.75 KG/M2

## 2022-09-14 DIAGNOSIS — O36.63X0 FETAL MACROSOMIA DURING PREGNANCY IN THIRD TRIMESTER, SINGLE OR UNSPECIFIED FETUS: ICD-10-CM

## 2022-09-14 DIAGNOSIS — O12.13 PROTEINURIA AFFECTING PREGNANCY IN THIRD TRIMESTER: ICD-10-CM

## 2022-09-14 DIAGNOSIS — Z3A.37 37 WEEKS GESTATION OF PREGNANCY: Primary | ICD-10-CM

## 2022-09-14 DIAGNOSIS — Z34.03 FIRST PREGNANCY, THIRD TRIMESTER: ICD-10-CM

## 2022-09-14 LAB
SL AMB  POCT GLUCOSE, UA: ABNORMAL
SL AMB POCT URINE PROTEIN: ABNORMAL

## 2022-09-14 PROCEDURE — PNV: Performed by: PHYSICIAN ASSISTANT

## 2022-09-14 NOTE — ASSESSMENT & PLAN NOTE
Norma Levine  is a 32 y o   @37w1d who presents for routine prenatal visit  Fetal macrosomia noted on last growth scan  MFM recommends considering 39 wk IOL  Pt would like this option  Will route for IOL request today  GBS negative     Has ++ proteinuria today  Normotensive  Denies HA, blurred vision, swelling, dysuria  UA C&S orders given to be collected at lab    Reports good fetal movement  Denies LOF, vaginal bleeding, regular uterine contractions, cramping, headaches or visual changes  Reviewed PTL/Labor precautions and FKC

## 2022-09-14 NOTE — PROGRESS NOTES
Fetal macrosomia in pregnancy in third trimester  MF recommends considering a 39 IOL  IOL request sent    37 weeks gestation of pregnancy  Marvin Ramirez  is a 32 y o  Jhony Barrera @37w1d who presents for routine prenatal visit  Fetal macrosomia noted on last growth scan  [facility] recommends considering 39 wk IOL  Pt would like this option  Will route for IOL request today  GBS negative     Has ++ proteinuria today  Normotensive  Denies HA, blurred vision, swelling, dysuria  UA C&S orders given to be collected at lab    Reports good fetal movement  Denies LOF, vaginal bleeding, regular uterine contractions, cramping, headaches or visual changes  Reviewed PTL/Labor precautions and FKC

## 2022-09-15 ENCOUNTER — APPOINTMENT (OUTPATIENT)
Dept: LAB | Facility: MEDICAL CENTER | Age: 27
End: 2022-09-15
Payer: COMMERCIAL

## 2022-09-15 ENCOUNTER — TELEPHONE (OUTPATIENT)
Dept: OBGYN CLINIC | Facility: CLINIC | Age: 27
End: 2022-09-15

## 2022-09-15 DIAGNOSIS — Z3A.37 37 WEEKS GESTATION OF PREGNANCY: ICD-10-CM

## 2022-09-15 DIAGNOSIS — O12.13 PROTEINURIA AFFECTING PREGNANCY IN THIRD TRIMESTER: ICD-10-CM

## 2022-09-15 LAB
BACTERIA UR QL AUTO: ABNORMAL /HPF
BILIRUB UR QL STRIP: NEGATIVE
CLARITY UR: ABNORMAL
COLOR UR: ABNORMAL
GLUCOSE UR STRIP-MCNC: ABNORMAL MG/DL
HGB UR QL STRIP.AUTO: NEGATIVE
KETONES UR STRIP-MCNC: ABNORMAL MG/DL
LEUKOCYTE ESTERASE UR QL STRIP: NEGATIVE
MUCOUS THREADS UR QL AUTO: ABNORMAL
NITRITE UR QL STRIP: NEGATIVE
NON-SQ EPI CELLS URNS QL MICRO: ABNORMAL /HPF
PH UR STRIP.AUTO: 6 [PH]
PROT UR STRIP-MCNC: ABNORMAL MG/DL
RBC #/AREA URNS AUTO: ABNORMAL /HPF
SP GR UR STRIP.AUTO: 1.02 (ref 1–1.03)
UROBILINOGEN UR STRIP-ACNC: <2 MG/DL
WBC #/AREA URNS AUTO: ABNORMAL /HPF

## 2022-09-15 PROCEDURE — 81001 URINALYSIS AUTO W/SCOPE: CPT

## 2022-09-15 PROCEDURE — 87086 URINE CULTURE/COLONY COUNT: CPT

## 2022-09-15 NOTE — TELEPHONE ENCOUNTER
----- Message from Viktoria Perdue PA-C sent at 9/14/2022  5:25 PM EDT -----  Regarding: IOL request  Procedure to be scheduled (IOL or CS): IOL  SVITLANA: Estimated Date of Delivery: 10/4/22  Indication for delivery: for fetal macrosomia  Requested date (s) of delivery: 9/27/22    If requested date is unavailable, is there a date by which the pt must be delivered?    Physician preference: n/a    If IOL, anticipated method: will need ripening  If CS, with or without tubal: n/a

## 2022-09-15 NOTE — TELEPHONE ENCOUNTER
IOL set for 9/27 at 3 pm  Pt is aware  Fetal macrosomia is diagnosed by MFM  Dr Mcgee Running notified  Pt to eat and hydrate prior to appt

## 2022-09-16 LAB — BACTERIA UR CULT: NORMAL

## 2022-09-17 ENCOUNTER — HOSPITAL ENCOUNTER (OUTPATIENT)
Facility: HOSPITAL | Age: 27
Discharge: HOME | End: 2022-09-18
Attending: OBSTETRICS & GYNECOLOGY | Admitting: OBSTETRICS & GYNECOLOGY
Payer: COMMERCIAL

## 2022-09-17 ENCOUNTER — NURSE TRIAGE (OUTPATIENT)
Dept: OTHER | Facility: OTHER | Age: 27
End: 2022-09-17

## 2022-09-17 VITALS
HEART RATE: 104 BPM | DIASTOLIC BLOOD PRESSURE: 76 MMHG | TEMPERATURE: 97.6 F | BODY MASS INDEX: 34.66 KG/M2 | WEIGHT: 208 LBS | HEIGHT: 65 IN | SYSTOLIC BLOOD PRESSURE: 128 MMHG | RESPIRATION RATE: 18 BRPM | OXYGEN SATURATION: 96 %

## 2022-09-18 PROCEDURE — 99214 OFFICE O/P EST MOD 30 MIN: CPT

## 2022-09-18 PROCEDURE — 99213 OFFICE O/P EST LOW 20 MIN: CPT | Performed by: OBSTETRICS & GYNECOLOGY

## 2022-09-18 NOTE — PROGRESS NOTES
L&D Triage Note - OB/GYN  Brittany Mai 32 y o  female MRN: [id number]  Unit/Bed#: LD TRIAGE 3 Encounter: [id number]      ASSESSMENT:    Brittany Mai is a 32 y o   at 37w4d presenting for contractions    PLAN:    1) r/o labor  - FHT: Cat 1, rate 130 bpm, moderate variability, accelerations present, no decelerations   - SVE: 0/50/-3  - 2 hr repeat SVE unchanged  - She was joão q2-3 min while on toco, spaced out to q5-10 min prior to D/C  She is stable for D/C from triage    Discharge Instructions:   - Continue routine prenatal care  - Discharge from [facility] triage with term labor precautions    - Reviewed rupture of membranes, false vs true labor, decreased fetal movement, and vaginal bleeding   - Pt to call provider with any concerns and follow up at her next scheduled prenatal appointment on 22 at 1520    - Case discussed with Dr Cortez Solano  SUBJECTIVE:    Brittany Mai 32 y o  Nazia Tovar at 37w4d with an Estimated Date of Delivery: 10/4/22     Pt presents to triage for contractions  She reports contractions began around 1730 every 9-10 minutes and have increased in frequency since that time  Denies leakage of fluid or vaginal bleeding  Reports good fetal movement  Denies chest pain, shortness of breath, abdominal pain, dysuria, hematuria, or any other complaints at this time    Her current obstetrical history is significant for suspected fetal macrosomia  EFW 3852 g - 8lb 8oz on 22       OBJECTIVE:    Vitals:    22 2139   BP: 128/76   Pulse: (S) (!) 121   Resp: 18   Temp: 97 6 °F (36 4 °C)   SpO2: 96%       ROS:  Constitutional: Negative  Respiratory: Negative  Cardiovascular: Negative    Gastrointestinal: Negative    General Physical Exam:  General: in no apparent distress, in no respiratory distress and acyanotic and alert  Cardiovascular: Cor Tachy and No murmurs  Lungs: non-labored breathing  Abdomen: abdomen is soft without significant tenderness, masses, organomegaly or guarding  Lower extremeties: nontender, no edema    Cervical Exam  SVE: 0 / 50% / -3    Fetal monitoring:  FHT:  130 bpm/ Moderate 6 - 25 bpm / 15 x 15 accelerations present, no decelerations  [place]: contractions q2-3 minutes     Jennifer Peña DO,  OBSYDNIEN PGY-1  9/17/2022 10:10 PM

## 2022-09-18 NOTE — TELEPHONE ENCOUNTER
Regardin wks preganant/Contractions  ----- Message from James Ding sent at 2022  8:00 PM EDT -----  " I am 37 wks pregnant, I would like to speak to a nurse   I've been experiencing fetal hiccups, which is new to us and also contractions "

## 2022-09-18 NOTE — TELEPHONE ENCOUNTER
37w4d, SVITLANA 10/4, Patient started with mild contractions about 6pm today  Reports the abdominal tightening and lower back pain only lasts about 20 seconds at a time  Has had about 8/hour for the past two hours  Denies any leaking of fluid or bleeding  Reports baby has been "quieter" then usual and she has been noticing some fetal hiccups  First baby  On call provider notified    Per on call patient was advised to report to L&D for evaluation  Patient verbalized understanding  Reason for Disposition   [1] MILD abdominal pain (e g , doesn't interfere with normal activities) or tightening AND [2] constant AND [3] present > 2 hours    Answer Assessment - Initial Assessment Questions  1  LOCATION: "Where does it hurt?"       Abdominal tightening, lower back pain  2  RADIATION: "Does the pain shoot anywhere else?" (e g , chest, back)      Lower back  3  ONSET: "When did the pain begin?" (Minutes, hours or days ago)       About 6pm today  4  ONSET: "Gradual or sudden onset?"      gradual  5  PATTERN: "Does the pain come and go, or has it been constant since it started?"       intermittent  6  SEVERITY: "How bad is the pain?" "What does it keep you from doing?"  (e g , Scale 1-10; mild, moderate, or severe)    - MILD (1-3): doesn't interfere with normal activities, abdomen soft and not tender to touch     - MODERATE (4-7): interferes with normal activities or awakens from sleep, tender to touch     - SEVERE (8-10): excruciating pain, doubled over, unable to do any normal activities      mild  10  FETAL MOVEMENT: "Has the baby's movement decreased or changed significantly from normal?"        "quiter then usual"  11  OTHER SYMPTOMS: "Has there been any vaginal bleeding, fever, vomiting, diarrhea, or urine problems?"        Fetal hiccups  12   SVITLANA: "What date are you expecting to deliver?"        37w4d    Protocols used: PREGNANCY - ABDOMINAL PAIN GREATER THAN 20 WEEKS EGA-ADULT-

## 2022-09-20 NOTE — PROGRESS NOTES
Induction scheduled for 9/27  GBS= Negative  L+D 9/17 for Fetal Hic-ups and decreased fetal mvt  Denies LOF, VB  CTX 1-2 per hr- nothing timeable    +FM

## 2022-09-21 ENCOUNTER — ROUTINE PRENATAL (OUTPATIENT)
Dept: OBGYN CLINIC | Facility: CLINIC | Age: 27
End: 2022-09-21

## 2022-09-21 VITALS — DIASTOLIC BLOOD PRESSURE: 60 MMHG | WEIGHT: 208.2 LBS | BODY MASS INDEX: 34.65 KG/M2 | SYSTOLIC BLOOD PRESSURE: 112 MMHG

## 2022-09-21 DIAGNOSIS — Z34.03 PRENATAL CARE, FIRST PREGNANCY, THIRD TRIMESTER: Primary | ICD-10-CM

## 2022-09-21 LAB
SL AMB  POCT GLUCOSE, UA: NORMAL
SL AMB POCT URINE PROTEIN: NORMAL

## 2022-09-21 PROCEDURE — PNV: Performed by: OBSTETRICS & GYNECOLOGY

## 2022-09-26 ENCOUNTER — ROUTINE PRENATAL (OUTPATIENT)
Dept: OBGYN CLINIC | Facility: CLINIC | Age: 27
End: 2022-09-26

## 2022-09-26 VITALS — WEIGHT: 208.8 LBS | BODY MASS INDEX: 34.75 KG/M2 | SYSTOLIC BLOOD PRESSURE: 120 MMHG | DIASTOLIC BLOOD PRESSURE: 78 MMHG

## 2022-09-26 DIAGNOSIS — Z34.03 PRENATAL CARE, FIRST PREGNANCY, THIRD TRIMESTER: Primary | ICD-10-CM

## 2022-09-26 LAB
SL AMB  POCT GLUCOSE, UA: 100
SL AMB POCT URINE PROTEIN: ABNORMAL

## 2022-09-26 PROCEDURE — PNV: Performed by: OBSTETRICS & GYNECOLOGY

## 2022-09-26 NOTE — PROGRESS NOTES
Sage Rush presents for routine PN visit  IOL scheduled for tomorrow  Discussed expectations  Suspected macrosomia  Precautions reviewed

## 2022-09-26 NOTE — PROGRESS NOTES
Feels well, she has no complaints   NL FM  Denies any bleeding or cramping   GBS negative   IOL 9/27/2022

## 2022-09-27 ENCOUNTER — ANESTHESIA EVENT (INPATIENT)
Dept: ANESTHESIOLOGY | Facility: HOSPITAL | Age: 27
End: 2022-09-27
Payer: COMMERCIAL

## 2022-09-27 ENCOUNTER — HOSPITAL ENCOUNTER (OUTPATIENT)
Dept: LABOR AND DELIVERY | Facility: HOSPITAL | Age: 27
Discharge: HOME | End: 2022-09-27
Payer: COMMERCIAL

## 2022-09-27 ENCOUNTER — ANESTHESIA (INPATIENT)
Dept: ANESTHESIOLOGY | Facility: HOSPITAL | Age: 27
End: 2022-09-27
Payer: COMMERCIAL

## 2022-09-27 ENCOUNTER — HOSPITAL ENCOUNTER (INPATIENT)
Facility: HOSPITAL | Age: 27
LOS: 2 days | Discharge: HOME | End: 2022-09-29
Attending: OBSTETRICS & GYNECOLOGY | Admitting: OBSTETRICS & GYNECOLOGY
Payer: COMMERCIAL

## 2022-09-27 PROBLEM — Z3A.39 39 WEEKS GESTATION OF PREGNANCY: Status: ACTIVE | Noted: 2022-06-11

## 2022-09-27 LAB
ABO GROUP BLD: NORMAL
BLD GP AB SCN SERPL QL: NEGATIVE
ERYTHROCYTE [DISTWIDTH] IN BLOOD BY AUTOMATED COUNT: 13.9 % (ref 11.6–15.1)
HCT VFR BLD AUTO: 36.7 % (ref 34.8–46.1)
HGB BLD-MCNC: 12.3 G/DL (ref 11.5–15.4)
HOLD SPECIMEN: NORMAL
MCH RBC QN AUTO: 25.3 PG (ref 26.8–34.3)
MCHC RBC AUTO-ENTMCNC: 33.5 G/DL (ref 31.4–37.4)
MCV RBC AUTO: 75 FL (ref 82–98)
PLATELET # BLD AUTO: 250 THOUSANDS/UL (ref 149–390)
PMV BLD AUTO: 9.3 FL (ref 8.9–12.7)
RBC # BLD AUTO: 4.87 MILLION/UL (ref 3.81–5.12)
RH BLD: POSITIVE
SPECIMEN EXPIRATION DATE: NORMAL
WBC # BLD AUTO: 9.58 THOUSAND/UL (ref 4.31–10.16)

## 2022-09-27 PROCEDURE — NC001 PR NO CHARGE: Performed by: OBSTETRICS & GYNECOLOGY

## 2022-09-27 PROCEDURE — 86901 BLOOD TYPING SEROLOGIC RH(D): CPT | Performed by: OBSTETRICS & GYNECOLOGY

## 2022-09-27 PROCEDURE — 86592 SYPHILIS TEST NON-TREP QUAL: CPT | Performed by: OBSTETRICS & GYNECOLOGY

## 2022-09-27 PROCEDURE — 86850 RBC ANTIBODY SCREEN: CPT | Performed by: OBSTETRICS & GYNECOLOGY

## 2022-09-27 PROCEDURE — 86900 BLOOD TYPING SEROLOGIC ABO: CPT | Performed by: OBSTETRICS & GYNECOLOGY

## 2022-09-27 PROCEDURE — 85027 COMPLETE CBC AUTOMATED: CPT | Performed by: OBSTETRICS & GYNECOLOGY

## 2022-09-27 RX ORDER — SODIUM CHLORIDE, SODIUM LACTATE, POTASSIUM CHLORIDE, CALCIUM CHLORIDE 600; 310; 30; 20 MG/100ML; MG/100ML; MG/100ML; MG/100ML
125 INJECTION, SOLUTION INTRAVENOUS CONTINUOUS
Status: DISCONTINUED | OUTPATIENT
Start: 2022-09-27 | End: 2022-09-29 | Stop reason: HOSPADM

## 2022-09-27 RX ORDER — OXYTOCIN/RINGER'S LACTATE 30/500 ML
1-30 PLASTIC BAG, INJECTION (ML) INTRAVENOUS
Status: DISCONTINUED | OUTPATIENT
Start: 2022-09-27 | End: 2022-09-28

## 2022-09-27 RX ORDER — PROMETHAZINE HYDROCHLORIDE 25 MG/ML
25 INJECTION, SOLUTION INTRAMUSCULAR; INTRAVENOUS EVERY 6 HOURS PRN
Status: DISCONTINUED | OUTPATIENT
Start: 2022-09-27 | End: 2022-09-28

## 2022-09-27 RX ORDER — BUTORPHANOL TARTRATE 1 MG/ML
1 INJECTION, SOLUTION INTRAMUSCULAR; INTRAVENOUS
Status: DISCONTINUED | OUTPATIENT
Start: 2022-09-27 | End: 2022-09-28

## 2022-09-27 RX ORDER — ONDANSETRON 2 MG/ML
4 INJECTION INTRAMUSCULAR; INTRAVENOUS EVERY 6 HOURS PRN
Status: DISCONTINUED | OUTPATIENT
Start: 2022-09-27 | End: 2022-09-28

## 2022-09-27 RX ADMIN — BUTORPHANOL TARTRATE 1 MG: 1 INJECTION, SOLUTION INTRAMUSCULAR; INTRAVENOUS at 20:22

## 2022-09-27 RX ADMIN — Medication 2 MILLI-UNITS/MIN: at 23:27

## 2022-09-27 RX ADMIN — Medication 25 MCG: at 16:12

## 2022-09-27 RX ADMIN — SODIUM CHLORIDE, SODIUM LACTATE, POTASSIUM CHLORIDE, AND CALCIUM CHLORIDE 125 ML/HR: .6; .31; .03; .02 INJECTION, SOLUTION INTRAVENOUS at 15:20

## 2022-09-27 RX ADMIN — PROMETHAZINE HYDROCHLORIDE 25 MG: 25 INJECTION INTRAMUSCULAR; INTRAVENOUS at 20:22

## 2022-09-27 NOTE — H&P
[facility] 32 y o  female MRN: [id number]  Unit/Bed#: LD  Encounter: [id number]    Assessment: 32 y o  Melody Calhoun at 39w0d admitted for induction of labor 2/2 fetal macrosomia  SVE: 0 5/50/-3  FHT: baseline 130bpm, moderate variability, 15x15 accels, no decels  Clinical EFW: 8lbs 8 oz (97%ile) at 35L6G ; Cephalic confirmed by transabdominal ultrasound  GBS status: negative   Postpartum contraception plan: unknown    Plan:   * 39 weeks gestation of pregnancy  Assessment & Plan  Admit   T&S, CBC, RPR  CLD  IV fluids  GBS prophylaxis is not needed   Induction with cytotec          Discussed case and plan w/ Dr Alejandro Brown      Chief Complaint: none    HPI: Isa Dangelo is a 32 y o  Melody Calhoun with an SVITLANA of 10/4/2022, by Last Menstrual Period at 39w0d who is being admitted for induction of labor 2/2 fetal macrosomia  She denies having uterine contractions, has no LOF, and reports no VB  She states she has felt good FM  Patient Active Problem List   Diagnosis    Obesity affecting pregnancy    39 weeks gestation of pregnancy    Fetal macrosomia in pregnancy in third trimester       Baby complications/comments: macrosomia (>97%)    Review of Systems   Constitutional: Negative for activity change, chills, diaphoresis, fatigue and fever  Respiratory: Negative for shortness of breath  Cardiovascular: Negative for chest pain  Gastrointestinal: Negative for constipation and nausea  Genitourinary: Negative for vaginal bleeding and vaginal discharge  Musculoskeletal: Negative for neck pain  Neurological: Negative for light-headedness and headaches  OB Hx:  OB History    Para Term  AB Living   1 0 0 0 0 0   SAB IAB Ectopic Multiple Live Births   0 0 0 0 0      # Outcome Date GA Lbr Shane/2nd Weight Sex Delivery Anes PTL Lv   1 Current                Past Medical Hx:  History reviewed  No pertinent past medical history      Past Surgical hx:  Past Surgical History:   Procedure Laterality Date    CYST REMOVAL  2016    WISDOM TOOTH EXTRACTION         Social Hx:  Social History     Tobacco Use    Smoking status: Never Smoker    Smokeless tobacco: Never Used   Vaping Use    Vaping Use: Never used   Substance Use Topics    Alcohol use: Not Currently     Comment: socially     Drug use: No       No Known Allergies    Medications Prior to Admission   Medication    [id number] (PRENA1 PO)       Objective:  Temp:  [97 8 °F (36 6 °C)-97 9 °F (36 6 °C)] 97 8 °F (36 6 °C)  HR:  [] 96  Resp:  [18] 18  BP: (107-121)/(68) 121/68  There is no height or weight on file to calculate BMI  Physical Exam:  Physical Exam  Constitutional:       Appearance: Normal appearance  HENT:      Head: Normocephalic and atraumatic  Mouth/Throat:      Mouth: Mucous membranes are moist       Pharynx: Oropharynx is clear  Cardiovascular:      Rate and Rhythm: Normal rate and regular rhythm  Pulses: Normal pulses  Heart sounds: Normal heart sounds  Pulmonary:      Effort: Pulmonary effort is normal       Breath sounds: Normal breath sounds  Abdominal:      General: There is distension (gravid)  Palpations: Abdomen is soft  Tenderness: There is no abdominal tenderness  Neurological:      General: No focal deficit present  Mental Status: She is alert and oriented to person, place, and time  Skin:     General: Skin is warm and dry  Capillary Refill: Capillary refill takes less than 2 seconds              FHT:  Baseline Rate: 125 bpm  Variability: Moderate 6-25 bpm  Accelerations: 15 x 15 or greater  Decelerations: None  FHR Category: Category I    TOCO:   Contraction Frequency (minutes): irregular  Contraction Duration (seconds): 40-60  Contraction Quality: Mild    Lab Results   Component Value Date    WBC 9 58 09/27/2022    HGB 12 3 09/27/2022    HCT 36 7 09/27/2022     09/27/2022     No results found for: NA, K, CL, CO2, BUN, CREATININE, GLUCOSE, AST, ALT  Prenatal Labs: Reviewed      Blood type: B pos  Antibody: negative  GBS: negative  HIV: non-reactive  Rubella: Immune  VDRL/RPR: Non reactive  HBsAg: Negative  Chlamydia: Negative  Gonorrhea: Negative  Diabetes 1 hour screen: elevated  3 hour glucose: normal  Platelets: 213  Hgb: 12 3  >2 Midnights  INPATIENT     Signature/Title: Kitty Kaur MD  Date: 9/27/2022  Time: 6:44 PM

## 2022-09-27 NOTE — OB LABOR/OXYTOCIN SAFETY PROGRESS
Oxytocin Safety Progress Check Note - Ami Bedolla 32 y o  female MRN: [id number]    Unit/Bed#: -01 Encounter: [id number]       Contraction Frequency (minutes): 2-6  Contraction Quality: Mild  Tachysystole: No   Cervical Dilation: 1        Cervical Effacement: 50  Fetal Station: -3  Baseline Rate: 125 bpm  Fetal Heart Rate: 130 BPM  FHR Category: Category I               Vital Signs:   Vitals:    09/27/22 1758   BP: 121/68   Pulse: 96   Resp: 18   Temp: 97 8 °F (36 6 °C)       Notes/comments: Patient comfortable and amenable to getting carpenter balloon  SVE is now 1/50/-3  FHt is CAT 1  Plan is to recheck once carpenter balloon falls out  PROCEDURE:  CARPENTER BALLOON PLACEMENT    A 24F carpenter with a 30cc balloon was selected, a speculum examination was performed and the cervix was located  A carpenter balloon was introduced over sterile gloved hands  Balloon advanced through cervix with a ringed forceps beyond the internal cervical os  A small amount amount of sterile saline solution was instilled in the balloon to confirm placement  Placement was confirmed to be beyond the internal cervical os  A total of 60cc of sterile saline solution was placed into the balloon  Pt tolerated well  Instructions left with RN to place carpenter to gravity with a 1L bag of IV fluid  Notify DO/MD when carpenter dislodged      Dr Michelle Torres MD 9/27/2022 7:54 PM

## 2022-09-28 LAB
BASE EXCESS BLDCOA CALC-SCNC: -1.7 MMOL/L (ref 3–11)
BASE EXCESS BLDCOV CALC-SCNC: -4.3 MMOL/L (ref 1–9)
HCO3 BLDCOA-SCNC: 22 MMOL/L (ref 17.3–27.3)
HCO3 BLDCOV-SCNC: 22.5 MMOL/L (ref 12.2–28.6)
O2 CT VFR BLDCOA CALC: 14.3 ML/DL
OXYHGB MFR BLDCOA: 59.7 %
OXYHGB MFR BLDCOV: 26.3 %
PCO2 BLDCOA: 34.8 MM[HG] (ref 30–60)
PCO2 BLDCOV: 46.9 MM HG (ref 27–43)
PH BLDCOA: 7.42 [PH] (ref 7.23–7.43)
PH BLDCOV: 7.3 [PH] (ref 7.19–7.49)
PO2 BLDCOA: 24.2 MM HG (ref 5–25)
PO2 BLDCOV: 14.9 MM HG (ref 15–45)
RPR SER QL: NORMAL
SAO2 % BLDCOV: 6.4 ML/DL

## 2022-09-28 PROCEDURE — 3E033VJ INTRODUCTION OF OTHER HORMONE INTO PERIPHERAL VEIN, PERCUTANEOUS APPROACH: ICD-10-PCS | Performed by: OBSTETRICS & GYNECOLOGY

## 2022-09-28 PROCEDURE — 0KQM0ZZ REPAIR PERINEUM MUSCLE, OPEN APPROACH: ICD-10-PCS | Performed by: OBSTETRICS & GYNECOLOGY

## 2022-09-28 PROCEDURE — 3E0P7VZ INTRODUCTION OF HORMONE INTO FEMALE REPRODUCTIVE, VIA NATURAL OR ARTIFICIAL OPENING: ICD-10-PCS | Performed by: OBSTETRICS & GYNECOLOGY

## 2022-09-28 PROCEDURE — 10907ZC DRAINAGE OF AMNIOTIC FLUID, THERAPEUTIC FROM PRODUCTS OF CONCEPTION, VIA NATURAL OR ARTIFICIAL OPENING: ICD-10-PCS | Performed by: OBSTETRICS & GYNECOLOGY

## 2022-09-28 PROCEDURE — 82805 BLOOD GASES W/O2 SATURATION: CPT | Performed by: OBSTETRICS & GYNECOLOGY

## 2022-09-28 PROCEDURE — 3E0DXGC INTRODUCTION OF OTHER THERAPEUTIC SUBSTANCE INTO MOUTH AND PHARYNX, EXTERNAL APPROACH: ICD-10-PCS | Performed by: OBSTETRICS & GYNECOLOGY

## 2022-09-28 PROCEDURE — 59400 OBSTETRICAL CARE: CPT | Performed by: OBSTETRICS & GYNECOLOGY

## 2022-09-28 RX ORDER — ONDANSETRON 2 MG/ML
4 INJECTION INTRAMUSCULAR; INTRAVENOUS EVERY 8 HOURS PRN
Status: DISCONTINUED | OUTPATIENT
Start: 2022-09-28 | End: 2022-09-29 | Stop reason: HOSPADM

## 2022-09-28 RX ORDER — MAGNESIUM HYDROXIDE/ALUMINUM HYDROXICE/SIMETHICONE 120; 1200; 1200 MG/30ML; MG/30ML; MG/30ML
15 SUSPENSION ORAL EVERY 6 HOURS PRN
Status: DISCONTINUED | OUTPATIENT
Start: 2022-09-28 | End: 2022-09-29 | Stop reason: HOSPADM

## 2022-09-28 RX ORDER — CALCIUM CARBONATE 200(500)MG
1000 TABLET,CHEWABLE ORAL 3 TIMES DAILY PRN
Status: DISCONTINUED | OUTPATIENT
Start: 2022-09-28 | End: 2022-09-29 | Stop reason: HOSPADM

## 2022-09-28 RX ORDER — DIAPER,BRIEF,INFANT-TODD,DISP
1 EACH MISCELLANEOUS DAILY PRN
Status: DISCONTINUED | OUTPATIENT
Start: 2022-09-28 | End: 2022-09-29 | Stop reason: HOSPADM

## 2022-09-28 RX ORDER — DOCUSATE SODIUM 100 MG/1
100 CAPSULE, LIQUID FILLED ORAL 2 TIMES DAILY
Status: DISCONTINUED | OUTPATIENT
Start: 2022-09-28 | End: 2022-09-29 | Stop reason: HOSPADM

## 2022-09-28 RX ORDER — LIDOCAINE HYDROCHLORIDE 10 MG/ML
INJECTION, SOLUTION EPIDURAL; INFILTRATION; INTRACAUDAL; PERINEURAL
Status: COMPLETED | OUTPATIENT
Start: 2022-09-28 | End: 2022-09-28

## 2022-09-28 RX ORDER — SENNOSIDES 8.6 MG
1 TABLET ORAL DAILY
Status: DISCONTINUED | OUTPATIENT
Start: 2022-09-29 | End: 2022-09-29 | Stop reason: HOSPADM

## 2022-09-28 RX ORDER — ACETAMINOPHEN 325 MG/1
650 TABLET ORAL EVERY 6 HOURS PRN
Status: DISCONTINUED | OUTPATIENT
Start: 2022-09-28 | End: 2022-09-29 | Stop reason: HOSPADM

## 2022-09-28 RX ORDER — OXYTOCIN/RINGER'S LACTATE 30/500 ML
250 PLASTIC BAG, INJECTION (ML) INTRAVENOUS CONTINUOUS
Status: ACTIVE | OUTPATIENT
Start: 2022-09-28 | End: 2022-09-28

## 2022-09-28 RX ORDER — SIMETHICONE 80 MG
80 TABLET,CHEWABLE ORAL 4 TIMES DAILY PRN
Status: DISCONTINUED | OUTPATIENT
Start: 2022-09-28 | End: 2022-09-29 | Stop reason: HOSPADM

## 2022-09-28 RX ORDER — ROPIVACAINE HYDROCHLORIDE 2 MG/ML
INJECTION, SOLUTION EPIDURAL; INFILTRATION; PERINEURAL CONTINUOUS PRN
Status: DISCONTINUED | OUTPATIENT
Start: 2022-09-28 | End: 2022-09-28 | Stop reason: HOSPADM

## 2022-09-28 RX ORDER — LIDOCAINE HYDROCHLORIDE AND EPINEPHRINE 15; 5 MG/ML; UG/ML
INJECTION, SOLUTION EPIDURAL AS NEEDED
Status: DISCONTINUED | OUTPATIENT
Start: 2022-09-28 | End: 2022-09-28 | Stop reason: HOSPADM

## 2022-09-28 RX ORDER — DIPHENHYDRAMINE HCL 25 MG
25 TABLET ORAL EVERY 6 HOURS PRN
Status: DISCONTINUED | OUTPATIENT
Start: 2022-09-28 | End: 2022-09-29 | Stop reason: HOSPADM

## 2022-09-28 RX ORDER — IBUPROFEN 600 MG/1
600 TABLET ORAL EVERY 6 HOURS PRN
Status: DISCONTINUED | OUTPATIENT
Start: 2022-09-28 | End: 2022-09-29 | Stop reason: HOSPADM

## 2022-09-28 RX ADMIN — DOCUSATE SODIUM 100 MG: 100 CAPSULE, LIQUID FILLED ORAL at 16:48

## 2022-09-28 RX ADMIN — LIDOCAINE HYDROCHLORIDE AND EPINEPHRINE 3 ML: 15; 5 INJECTION, SOLUTION EPIDURAL at 00:05

## 2022-09-28 RX ADMIN — Medication: at 07:12

## 2022-09-28 RX ADMIN — LIDOCAINE HYDROCHLORIDE 3 ML: 10 INJECTION, SOLUTION EPIDURAL; INFILTRATION; INTRACAUDAL; PERINEURAL at 00:03

## 2022-09-28 RX ADMIN — SODIUM CHLORIDE, SODIUM LACTATE, POTASSIUM CHLORIDE, AND CALCIUM CHLORIDE 125 ML/HR: .6; .31; .03; .02 INJECTION, SOLUTION INTRAVENOUS at 07:08

## 2022-09-28 RX ADMIN — HYDROCORTISONE 1 APPLICATION: 10 CREAM TOPICAL at 15:09

## 2022-09-28 RX ADMIN — IBUPROFEN 600 MG: 600 TABLET ORAL at 16:48

## 2022-09-28 RX ADMIN — ROPIVACAINE HYDROCHLORIDE 10 ML/HR: 2 INJECTION, SOLUTION EPIDURAL; INFILTRATION at 00:08

## 2022-09-28 RX ADMIN — WITCH HAZEL 1 PAD: 500 SOLUTION RECTAL; TOPICAL at 15:09

## 2022-09-28 RX ADMIN — Medication 1 APPLICATION: at 15:09

## 2022-09-28 RX ADMIN — IBUPROFEN 600 MG: 600 TABLET ORAL at 23:11

## 2022-09-28 RX ADMIN — Medication: at 00:10

## 2022-09-28 NOTE — DISCHARGE INSTRUCTIONS

## 2022-09-28 NOTE — PLAN OF CARE
Problem: Knowledge Deficit  Goal: Verbalizes understanding of labor plan  Description: Assess patient/family/caregiver's baseline knowledge level and ability to understand information  Provide education via patient/family/caregiver's preferred learning method at appropriate level of understanding  1  Provide teaching at level of understanding  2  Provide teaching via preferred learning method(s)  Outcome: Progressing  Goal: Patient/family/caregiver demonstrates understanding of disease process, treatment plan, medications, and discharge instructions  Description: Complete learning assessment and assess knowledge base  Interventions:  - Provide teaching at level of understanding  - Provide teaching via preferred learning methods  Outcome: Progressing     Problem: Labor & Delivery  Goal: Manages discomfort  Description: Assess and monitor for signs and symptoms of discomfort  Assess patient's pain level regularly and per hospital policy  Administer medications as ordered  Support use of nonpharmacological methods to help control pain such as distraction, imagery, relaxation, and application of heat and cold  Collaborate with interdisciplinary team and patient to determine appropriate pain management plan  1  Include patient in decisions related to comfort  2  Offer non-pharmacological pain management interventions  3  Report ineffective pain management to physician  Outcome: Progressing  Goal: Patient vital signs are stable  Description: 1  Assess vital signs - vaginal delivery    Outcome: Progressing     Problem: PAIN - ADULT  Goal: Verbalizes/displays adequate comfort level or baseline comfort level  Description: Interventions:  - Encourage patient to monitor pain and request assistance  - Assess pain using appropriate pain scale  - Administer analgesics based on type and severity of pain and evaluate response  - Implement non-pharmacological measures as appropriate and evaluate response  - Consider cultural and social influences on pain and pain management  - Notify physician/advanced practitioner if interventions unsuccessful or patient reports new pain  Outcome: Progressing     Problem: INFECTION - ADULT  Goal: Absence or prevention of progression during hospitalization  Description: INTERVENTIONS:  - Assess and monitor for signs and symptoms of infection  - Monitor lab/diagnostic results  - Monitor all insertion sites, i e  indwelling lines, tubes, and drains  - Monitor endotracheal if appropriate and nasal secretions for changes in amount and color  - [place] appropriate cooling/warming therapies per order  - Administer medications as ordered  - Instruct and encourage patient and family to use good hand hygiene technique  - Identify and instruct in appropriate isolation precautions for identified infection/condition  Outcome: Progressing  Goal: Absence of fever/infection during neutropenic period  Description: INTERVENTIONS:  - Monitor WBC    Outcome: Progressing     Problem: SAFETY ADULT  Goal: Patient will remain free of falls  Description: INTERVENTIONS:  - Educate patient/family on patient safety including physical limitations  - Instruct patient to call for assistance with activity   - Consult OT/PT to assist with strengthening/mobility   - Keep Call bell within reach  - Keep bed low and locked with side rails adjusted as appropriate  - Keep care items and personal belongings within reach  - Initiate and maintain comfort rounds  - Make Fall Risk Sign visible to staff  - Apply yellow socks and bracelet for high fall risk patients  - Consider moving patient to room near nurses station  Outcome: Progressing  Goal: Maintain or return to baseline ADL function  Description: INTERVENTIONS:  -  Assess patient's ability to carry out ADLs; assess patient's baseline for ADL function and identify physical deficits which impact ability to perform ADLs (bathing, care of mouth/teeth, toileting, grooming, dressing, etc )  - Assess/evaluate cause of self-care deficits   - Assess range of motion  - Assess patient's mobility; develop plan if impaired  - Assess patient's need for assistive devices and provide as appropriate  - Encourage maximum independence but intervene and supervise when necessary  - Involve family in performance of ADLs  - Assess for home care needs following discharge   - Consider OT consult to assist with ADL evaluation and planning for discharge  - Provide patient education as appropriate  Outcome: Progressing  Goal: Maintains/Returns to pre admission functional level  Description: INTERVENTIONS:  - Perform BMAT or MOVE assessment daily    - Set and communicate daily mobility goal to care team and patient/family/caregiver     - Collaborate with rehabilitation services on mobility goals if consulted  - Out of bed for toileting  - Record patient progress and toleration of activity level   Outcome: Progressing     Problem: DISCHARGE PLANNING  Goal: Discharge to home or other facility with appropriate resources  Description: INTERVENTIONS:  - Identify barriers to discharge w/patient and caregiver  - Arrange for needed discharge resources and transportation as appropriate  - Identify discharge learning needs (meds, wound care, etc )  - Arrange for interpretive services to assist at discharge as needed  - Refer to Case Management Department for coordinating discharge planning if the patient needs post-hospital services based on physician/advanced practitioner order or complex needs related to functional status, cognitive ability, or social support system  Outcome: Progressing

## 2022-09-28 NOTE — DISCHARGE SUMMARY
Discharge Summary - OB/GYN  Lynda Harkins 32 y o  female MRN: [id number]  Unit/Bed#: LD - Encounter: [id number]    Admission Date: 2022     Discharge Date: 22    Admitting Attending: Francesca Hillman MD    Delivering Attending: Dr Leslie Andrade    Discharging Attending: Dr Herlinda Brink    Diagnosis:   1  Pregnancy at 39w1d  2  Fetal macrosomia  3  Obesity with BMI 34    Procedures: spontaneous vaginal delivery    Anesthesia: epidural    Hospital course: Lynda Harkins is a 32 y o  [place] at 44 weeks 1 day who was admitted for induction of labor for fetal macrosomia  She was initially 0 5/50/-3 with FHT baseline 130, reactive  She received Cytotec and [place] balloon for induction  She was later started on Pitocin and received an epidural for pain control  She was AROM for clear fluid, progressed to complete, and began pushing  Her FHT showed moderate variability throughout the course of labor  She delivered a viable male  on 2022 at 1220  Weight 8lbs 6 6oz via normal spontaneous vaginal delivery  She sustained a second-degree laceration during delivery which was adequately repaired  Apgars were 8 (1 min) and 8 (5 min)   was transferred to  nursery  Patient tolerated the procedure well  Her post-delivery course was uncomplicated  Her postpartum pain was well controlled with oral analgesics  On day of discharge, she was ambulating and able to reasonably perform all ADLs  She was voiding and had appropriate bowel function  Pain was well controlled  She was discharged home on postpartum day #1 without complications  Patient was instructed to follow up with her OB as an outpatient and was given appropriate warnings to call provider if she develops signs of infection or uncontrolled pain      Complications: none apparent    Condition at discharge: good     Discharge instructions/medications/information to patient and family:   See after visit summary for information provided to patient and family  Provisions for Follow-Up Care:  See after visit summary for information related to follow-up care and any pertinent home health orders  Disposition: See After Visit Summary for discharge disposition information      Planned Readmission: No

## 2022-09-28 NOTE — OB LABOR/OXYTOCIN SAFETY PROGRESS
Oxytocin Safety Progress Check Note - Joseph Disla 32 y o  female MRN: [id number]    Unit/Bed#: -01 Encounter: [id number]    Dose (florence-units/min) Oxytocin: 12 florence-units/min  Contraction Frequency (minutes): 1-5  Contraction Quality: Moderate  Tachysystole: No   Cervical Dilation: 7        Cervical Effacement: 80  Fetal Station: 0  Baseline Rate: 135 bpm  Fetal Heart Rate: 140 BPM  FHR Category: Category I               Vital Signs:   Vitals:    09/28/22 0830   BP: 119/57   Pulse: (!) 106   Resp:    Temp:    SpO2:        Notes/comments:    AROM clear fluid  Continue to monitor progress    Dereje Taylor MD 9/28/2022 8:39 AM

## 2022-09-28 NOTE — OB LABOR/OXYTOCIN SAFETY PROGRESS
Labor Progress Note - Kareem Garg 32 y o  female MRN: [id number]    Unit/Bed#: -01 Encounter: [id number]    Dose (florence-units/min) Oxytocin: 2 florence-units/min  Contraction Frequency (minutes): 1-4  Contraction Quality: Mild  Tachysystole: No   Cervical Dilation: 4        Cervical Effacement: 60  Fetal Station: -3  Baseline Rate: 120 bpm  Fetal Heart Rate: 121 BPM  FHR Category: Category I               Vital Signs:   Vitals:    09/27/22 2124   BP: 125/71   Pulse: 81   Resp:    Temp:    SpO2:        Notes/comments: Patient feeling really uncomfortable and requesting epidural  On recheck of SVE it was found to be unchanged  FHt is CAT 1  Alecia irregularly  Plan is to start epidural and then pitocin       Discussed with Dr Pal Prater MD 9/27/2022 11:28 PM

## 2022-09-28 NOTE — PLAN OF CARE
Problem: PAIN - ADULT  Goal: Verbalizes/displays adequate comfort level or baseline comfort level  Description: Interventions:  - Encourage patient to monitor pain and request assistance  - Assess pain using appropriate pain scale  - Administer analgesics based on type and severity of pain and evaluate response  - Implement non-pharmacological measures as appropriate and evaluate response  - Consider cultural and social influences on pain and pain management  - Notify physician/advanced practitioner if interventions unsuccessful or patient reports new pain  Outcome: Progressing     Problem: INFECTION - ADULT  Goal: Absence or prevention of progression during hospitalization  Description: INTERVENTIONS:  - Assess and monitor for signs and symptoms of infection  - Monitor lab/diagnostic results  - Monitor all insertion sites, i e  indwelling lines, tubes, and drains  - Monitor endotracheal if appropriate and nasal secretions for changes in amount and color  - [place] appropriate cooling/warming therapies per order  - Administer medications as ordered  - Instruct and encourage patient and family to use good hand hygiene technique  - Identify and instruct in appropriate isolation precautions for identified infection/condition  Outcome: Progressing  Goal: Absence of fever/infection during neutropenic period  Description: INTERVENTIONS:  - Monitor WBC    Outcome: Progressing     Problem: SAFETY ADULT  Goal: Patient will remain free of falls  Description: INTERVENTIONS:  - Educate patient/family on patient safety including physical limitations  - Instruct patient to call for assistance with activity   - Consult OT/PT to assist with strengthening/mobility   - Keep Call bell within reach  - Keep bed low and locked with side rails adjusted as appropriate  - Keep care items and personal belongings within reach  - Initiate and maintain comfort rounds  - Make Fall Risk Sign visible to staff  - Apply yellow socks and bracelet for high fall risk patients  - Consider moving patient to room near nurses station  Outcome: Progressing  Goal: Maintain or return to baseline ADL function  Description: INTERVENTIONS:  -  Assess patient's ability to carry out ADLs; assess patient's baseline for ADL function and identify physical deficits which impact ability to perform ADLs (bathing, care of mouth/teeth, toileting, grooming, dressing, etc )  - Assess/evaluate cause of self-care deficits   - Assess range of motion  - Assess patient's mobility; develop plan if impaired  - Assess patient's need for assistive devices and provide as appropriate  - Encourage maximum independence but intervene and supervise when necessary  - Involve family in performance of ADLs  - Assess for home care needs following discharge   - Consider OT consult to assist with ADL evaluation and planning for discharge  - Provide patient education as appropriate  Outcome: Progressing  Goal: Maintains/Returns to pre admission functional level  Description: INTERVENTIONS:  - Perform BMAT or MOVE assessment daily    - Set and communicate daily mobility goal to care team and patient/family/caregiver     - Collaborate with rehabilitation services on mobility goals if consulted  - Out of bed for toileting  - Record patient progress and toleration of activity level   Outcome: Progressing     Problem: DISCHARGE PLANNING  Goal: Discharge to home or other facility with appropriate resources  Description: INTERVENTIONS:  - Identify barriers to discharge w/patient and caregiver  - Arrange for needed discharge resources and transportation as appropriate  - Identify discharge learning needs (meds, wound care, etc )  - Arrange for interpretive services to assist at discharge as needed  - Refer to Case Management Department for coordinating discharge planning if the patient needs post-hospital services based on physician/advanced practitioner order or complex needs related to functional status, cognitive ability, or social support system  Outcome: Progressing     Problem: Knowledge Deficit  Goal: Verbalizes understanding of labor plan  Description: Assess patient/family/caregiver's baseline knowledge level and ability to understand information  Provide education via patient/family/caregiver's preferred learning method at appropriate level of understanding  1  Provide teaching at level of understanding  2  Provide teaching via preferred learning method(s)  9/28/2022 1238 by Liz Coffey RN  Outcome: Completed  9/28/2022 0738 by Liz Coffey RN  Outcome: Progressing  Goal: Patient/family/caregiver demonstrates understanding of disease process, treatment plan, medications, and discharge instructions  Description: Complete learning assessment and assess knowledge base  Interventions:  - Provide teaching at level of understanding  - Provide teaching via preferred learning methods  9/28/2022 1238 by Liz Coffey RN  Outcome: Completed  9/28/2022 0738 by Liz Coffey RN  Outcome: Progressing     Problem: Labor & Delivery  Goal: Manages discomfort  Description: Assess and monitor for signs and symptoms of discomfort  Assess patient's pain level regularly and per hospital policy  Administer medications as ordered  Support use of nonpharmacological methods to help control pain such as distraction, imagery, relaxation, and application of heat and cold  Collaborate with interdisciplinary team and patient to determine appropriate pain management plan  1  Include patient in decisions related to comfort  2  Offer non-pharmacological pain management interventions  3  Report ineffective pain management to physician  9/28/2022 1238 by Liz Coffey RN  Outcome: Completed  9/28/2022 0738 by Liz Coffey RN  Outcome: Progressing  Goal: Patient vital signs are stable  Description: 1  Assess vital signs - vaginal delivery    9/28/2022 1238 by Liz Coffey RN  Outcome: Completed  9/28/2022 0738 by Lyndsey Roy RN  Outcome: Progressing

## 2022-09-28 NOTE — L&D DELIVERY NOTE
DELIVERY NOTE  Akua Mesa 32 y o  female MRN: [id number]  Unit/Bed#:   Encounter: [id number]      Obstetrician:  Dr Dean Landa  Assistant:    Pre-Delivery Diagnosis:   33 yo  @ 39w1d  Induction of labor elective  Suspected macrosomia    Post-Delivery Diagnosis:   32yo  @ 39w1d s/p   2nd degree laceration with extension to right sulcus    Procedure:     Repair of vaginal laceration    Indications for instrumental delivery: none  Quantitative Blood Loss: 901KP  Complications:  none  Specimens: Cord, blood, gasses  Description of Delivery: Patient delivered a viable male  Apgars 8 @ 1 min and 8 @ 5 min    The patient was found to be complete and began pushing  When delivery was imminent, the patient was placed in dorsal lithotomy position, prepped and draped in the usual sterile fahsing for a vaginal delivery  The head delivered spontaneously over an intact perineum  There was not a nuchal cord which was not reduced  With the assistance of maternal expulsive efforts and downward traction of fetal head, the anterior shoulder was delivered  The same manner was applied for the posterior shoulder but with upward traction  After delivery of the , the umbilical cord was doubly clamped and cut and the  was passed off to  staff for routine care  Umbilical cord blood and umbilical artery and venous gases were collected  Placenta was delivered with fundal massage and gentle traction on the cord  Placenta delivered intact with a central 3-vessel cord  Active management of the third stage of labor was undertaken with IV pitocin  Bleeding was noted to be under control  Inspection of the perineum, vagina, labia, and urethra revealed 2nd laceration with extension to right sulcus which was then repaired in standard fashion with 3-0 Vicryl rapid  The lacerations showed good tissue reapproximation and hemostasis    Mother and baby are currently recovering nicely in stable condition  All sponge, instrument and needle counts were correct      Recent Results (from the past 6 hour(s))   CORD, Blood gas, arterial    Collection Time: 09/28/22 12:26 PM   Result Value Ref Range    pH, Cord Art 7 418 7 230 - 7 430    pCO2, Cord Art 34 8 30 0 - 60 0    pO2, Cord Art 24 2 5 0 - 25 0 mm HG    HCO3, Cord Art 22 0 17 3 - 27 3 mmol/L    Base Exc, Cord Art -1 7 (L) 3 0 - 11 0 mmol/L    O2 Content, Cord Art 14 3 ml/dl    O2 Hgb, Arterial Cord 59 7 %   CORD, Blood gas, venous    Collection Time: 09/28/22 12:26 PM   Result Value Ref Range    pH, Cord Harry 7 298 7 190 - 7 490    pCO2, Cord Harry 46 9 (H) 27 0 - 43 0 mm HG    pO2, Cord Harry 14 9 (L) 15 0 - 45 0 mm HG    HCO3, Cord Harry 22 5 12 2 - 28 6 mmol/L    Base Exc, Cord Harry -4 3 (L) 1 0 - 9 0 mmol/L    O2 Cont, Cord Harry 6 4 mL/dL    O2 HGB,VENOUS CORD 26 3 %

## 2022-09-28 NOTE — ANESTHESIA POSTPROCEDURE EVALUATION
Post-Op Assessment Note    CV Status:  Stable    Pain management: adequate     Mental Status:  Alert and awake   Hydration Status:  Euvolemic   PONV Controlled:  Controlled   Airway Patency:  Patent      Post Op Vitals Reviewed: Yes      Staff: CRNA     Post-op block assessment: no complications, site cleaned and catheter intact      No complications documented      BP   123/58   Temp      Pulse    Resp      SpO2

## 2022-09-28 NOTE — OB LABOR/OXYTOCIN SAFETY PROGRESS
Labor Progress Note - Jose Alfredo Salcedo 32 y o  female MRN: [id number]    Unit/Bed#: -01 Encounter: [id number]       Contraction Frequency (minutes): 1 5-3  Contraction Quality: Mild  Tachysystole: No   Cervical Dilation: 4        Cervical Effacement: 60  Fetal Station: -3  Baseline Rate: 120 bpm  Fetal Heart Rate: 122 BPM  FHR Category: Category I               Vital Signs:   Vitals:    09/27/22 2124   BP: 125/71   Pulse: 81   Resp:    Temp:    SpO2:        Notes/comments: Patient is comfortable supine  FB fell out  SVE is now 4/60/-3  FHT is CAT 1  Plan is to start pitocin and recheck in 2 hours       Discussed with Dr Donald Green MD 9/27/2022 9:45 PM

## 2022-09-28 NOTE — ANESTHESIA PREPROCEDURE EVALUATION
Procedure:  LABOR ANALGESIA    Relevant Problems   No relevant active problems        Physical Exam    Airway    Mallampati score: II  TM Distance: >3 FB  Neck ROM: full     Dental   No notable dental hx     Cardiovascular  Cardiovascular exam normal    Pulmonary  Pulmonary exam normal     Other Findings        Anesthesia Plan  ASA Score- 2     Anesthesia Type- epidural with ASA Monitors  Additional Monitors:   Airway Plan:           Plan Factors-Exercise tolerance (METS): >4 METS  Chart reviewed  Existing labs reviewed  Patient summary reviewed  Induction-     Postoperative Plan-     Informed Consent- Anesthetic plan and risks discussed with patient  I personally reviewed this patient with the CRNA  Discussed and agreed on the Anesthesia Plan with the ELSIE Singh

## 2022-09-28 NOTE — OB LABOR/OXYTOCIN SAFETY PROGRESS
Oxytocin Safety Progress Check Note - Viry Castelan 32 y o  female MRN: [id number]    Unit/Bed#: -01 Encounter: [id number]    Dose (florence-units/min) Oxytocin: 2 florence-units/min  Contraction Frequency (minutes): 1-4  Contraction Quality: Mild  Tachysystole: No   Cervical Dilation: 5        Cervical Effacement: 70  Fetal Station: -1  Baseline Rate: 120 bpm  Fetal Heart Rate: 121 BPM  FHR Category: Category I               Vital Signs:   Vitals:    09/27/22 2124   BP: 125/71   Pulse: 81   Resp:    Temp:    SpO2:        Notes/comments: Patient is comfortable /-on epidural SVE is now 5/70/-1  FHT is CAT 1   Plan is to continue to titrate Pitocin and recheck in two hours         Discussed with Dr Anneliese Esquivel MD 9/28/2022 3:19 AM

## 2022-09-28 NOTE — L&D DELIVERY NOTE
Vaginal Delivery Summary - OB/GYN   [place] Null 32 y o  female MRN: [id number]  Unit/Bed#: LD  Encounter: [id number]          Pre-delivery Diagnosis:   1  Pregnancy at 39 weeks 1 day   2  Fetal macrosomia  3  Obesity, BMI 34      Post-delivery Diagnosis: same, delivered    Procedure: Spontaneous Vaginal Delivery ***    Attending: ***    Assistant(s): ***    Anesthesia: ***Epidural    QBL: ***cc    Complications: ***none apparent    Specimens:   1  Arterial and venous cord gases  2  Cord blood  3  Segment of umbilical cord  4  Placenta to ***storage     Findings:  1  Viable {Desc; male/female:31019} on 2022 at ***, with APGARS of {NUMBERS 1-10:51927} and {NUMBERS 1-10:90571} at 1 and 5 minutes respectively,  2  Spontaneous delivery of intact placenta at ***  3  *** degree laceration repaired with ***  4  Umbilical Cord Venous Blood Gas:  Results from last 7 days   Lab Units 22  1226   PH COV  7 298   PCO2 COV mm HG 46 9*   HCO3 COV mmol/L 22 5   BASE EXC COV mmol/L -4 3*   O2 CT CD VB mL/dL 6 4   O2 HGB, VENOUS CORD % 26 3     5  Umbilical Cord Arterial Blood Gas:  Results from last 7 days   Lab Units 22  1226   PH COA  7 418   PCO2 COA  34 8   PO2 COA mm HG 24 2   HCO3 COA mmol/L 22 0   BASE EXC COA mmol/L -1 7*   O2 CONTENT CORD ART ml/dl 14 3   O2 HGB, ARTERIAL CORD % 59 7         Disposition:  Patient tolerated the procedure well and was recovering in labor and delivery room       Brief history and labor course:  Ms Barry Johns is a 32 y o  G***P*** at ***wk***d  She presented to labor and delivery for ***  Her pregnancy was ***complicated***  On exam in triage she was noted to be ***; Her FHT had ***  She was admitted for ***  Description of procedure:    After pushing for *** minutes, at *** patient delivered a viable *** , wt pending, apgars of *** (1 min) and *** (5 min)  The fetal vertex delivered spontaneously  There was ***no nuchal cord   Baby was ***, restituted to ***  The *** anterior shoulder delivered atraumatically with maternal expulsive forces and the assistance of gentle downward traction  The *** posterior shoulder delivered with maternal expulsive forces and the assistance of gentle upward traction  The remainder of the fetus delivered spontaneously  Upon delivery, the infant was placed on the mothers abdomen and the cord was clamped and cut  The infant was noted to cry spontaneously and was moving all extremities appropriately  There was no evidence for injury  Awaiting nurse resuscitators evaluated the   Arterial and venous cord blood gases and cord blood was collected for analysis  These were promptly sent to the lab  In the immediate post-partum, ***30 units of IV pitocin was administered, and the uterus was noted to contract down well with massage and pitocin  The placenta delivered spontaneously at *** and was noted to have a ***centrally inserted 3 vessel cord  The vagina, cervix, perineum, and rectum were inspected and there was noted to be a ***     ***Laceration Repair  ***Patient was comfortable with epidural at that time  A *** was identified and required repair  Laceration was repaired in standard fashion with {Suture size:12765} {Devices; suture type:29175} to reapproximate the laceration  Good reapproximation and hemostasis was confirmed at the conclusion of this procedure  2nd Degree Lac  The vagina, cervix, perineum, and rectum were inspected and there was noted to be a 2nd degree laceration which was repaired with 3***-0 vicryl rapide  Under adequate anesthesia, the apex of the vaginal laceration was identified and an anchoring suture was placed 1 cm above the apex  The vaginal mucosa and underlying rectovaginal fascia were closed using a running locked 3***-0 Vicryl-CT 1 suture to the hymenal ring  The suture was then brought underneath the hymenal ring    The suture***A stitch*** was then placed through the bulbocavernosus muscle*** and tied***  Continuing with the same suture, the transverse perineal muscles were reapproximated with 2 transverse running sutures  The suture was brought to the posterior apex of the skin laceration and then the skin was reapproximated in a subcuticular fashion to the hymenal ring  Excellent hemostasis was achieved  At the conclusion of the procedure, all needle, sponge, and instrument counts were noted to be correct  Patient tolerated the procedure well and was allowed to recover in labor and delivery room with family and  before being transferred to the post-partum floor  Dr Nir Kim was present and participated in all key portions of the case        Michael Leahy  2022  1:21 PM

## 2022-09-28 NOTE — OB LABOR/OXYTOCIN SAFETY PROGRESS
Oxytocin Safety Progress Check Note - Matthew Alfaro 32 y o  female MRN: [id number]    Unit/Bed#: -01 Encounter: [id number]    Dose (florence-units/min) Oxytocin: 12 florence-units/min  Contraction Frequency (minutes): 1-4  Contraction Quality: Moderate  Tachysystole: No   Cervical Dilation: Lip/rim (Comment)        Cervical Effacement: 100  Fetal Station: 0  Baseline Rate: 130 bpm  Fetal Heart Rate: 140 BPM  FHR Category: Category II   FHT: 120's moderate varibility with accelerations, rare variable      Vital Signs:   Vitals:    09/28/22 0944   BP: 114/72   Pulse: 102   Resp:    Temp:    SpO2:        Notes/comments:   Patient feeling pressure, continue to monitor and manage      @ME@ 9/28/2022 10:27 AM

## 2022-09-28 NOTE — OB LABOR/OXYTOCIN SAFETY PROGRESS
Oxytocin Safety Progress Check Note - Benigno Zabala 32 y o  female MRN: [id number]    Unit/Bed#: -01 Encounter: [id number]    Dose (florence-units/min) Oxytocin: 10 florence-units/min  Contraction Frequency (minutes): 1-4  Contraction Quality: Mild  Tachysystole: No   Cervical Dilation: 5        Cervical Effacement: 70  Fetal Station: -1  Baseline Rate: 120 bpm  Fetal Heart Rate: 115 BPM  FHR Category: Category I               Vital Signs:   Vitals:    09/28/22 0500   BP: 99/57   Pulse: (!) 108   Resp:    Temp:    SpO2:        Notes/comments: Patient is comfortable on epidural  SVE is unchanged at 5/70/-1  FHT is CAT 1  She is joão more regularly  Plan is to continue to titrate pitocin and for Dr Komal Pope to AROM patient before morning rounds  Discussed with Dr Komal Jurado MD 9/28/2022 5:14 AM

## 2022-09-28 NOTE — ANESTHESIA PROCEDURE NOTES
Epidural Block    Reason for block: procedure for pain and at surgeon's request  Staffing  Performed: CRNA   Anesthesiologist: Trista Jose MD  Resident/CRNA: Alfredo Brand CRNA  Preanesthetic Checklist  Completed: patient identified, IV checked, site marked, risks and benefits discussed, surgical consent, monitors and equipment checked, pre-op evaluation and timeout performed  Epidural  Patient position: sitting  Prep: ChloraPrep  Patient monitoring: cardiac monitor and frequent blood pressure checks  Approach: midline  Location: lumbar  Injection technique: REEMA air  Needle  Needle type: Tuohy   Needle gauge: 18 G  Catheter type: side hole  Catheter size: 20 G  Catheter at skin depth: 11 cm  Test dose: negativelidocaine (PF) (XYLOCAINE-MPF) 1 % - Infiltration   3 mL - 9/28/2022 12:03:00 AM  Assessment  Number of attempts: 1negative aspiration for CSF, negative aspiration for heme and no paresthesia on injection  patient tolerated the procedure well with no immediate complications

## 2022-09-28 NOTE — LACTATION NOTE
This note was copied from a baby's chart  CONSULT - LACTATION  Baby Boy Kyle Osborne) Lianna Chew 0 days male MRN: [id number]    [address] Room / Bed: (N)/(N) Encounter: [id number]    Maternal Information     MOTHER:  Favian Mascorro  Maternal Age: 32 y o    OB History: # 1 - Date: 22, Sex: Male, Weight: 3815 g (8 lb 6 6 oz), GA: 39w1d, Delivery: Vaginal, Spontaneous, Apgar1: 8, Apgar5: 8, Living: Living, Birth Comments: None   Previouse breast reduction surgery? No    Lactation history:   Has patient previously breast fed: No   How long had patient previously breast fed:     Previous breast feeding complications:       Past Surgical History:   Procedure Laterality Date    CYST REMOVAL  2016    WISDOM TOOTH EXTRACTION          Birth information:  YOB: 2024   Time of birth: 12:20 PM   Sex: male   Delivery type: Vaginal, Spontaneous   Birth Weight: 3815 g (8 lb 6 6 oz)   Percent of Weight Change: 0%     Gestational Age: 36w3d   [unfilled]    Assessment     Breast and nipple assessment: normal assessment     Assessment: normal assessment - Sonny    Feeding assessment: feeding well  LATCH:  Latch: Grasps breast, tongue down, lips flanged, rhythmic sucking   Audible Swallowing: Spontaneous and intermittent (24 hours old)   Type of Nipple: Everted (After stimulation)   Comfort (Breast/Nipple): Soft/non-tender   Hold (Positioning): Partial assist, teach one side, mother does other, staff holds   LATCH Score: 9          Feeding recommendations:  breast feed on demand  Education on positioning up the breast  Education on alignment, belly to belly, s2s and alignment  Demonstration and teach back of HE, breast compressions  Brought Sonny up to the R breast in  [employer] hold  Wide gape, deep latch with education  Active, coordinated sucking  Mom states she wants to breastfeed for 12 wks, then formula   Education on pumping and how to create supply  Education on how babies breathe at the breast  Active, coordinated sucking  Mom has a pump at home  RSB/DC reviewed    Enc  To call lactation for next latch    Provided education on alignment of nose to breast; bring baby to breast and not breast to baby; support head with opp  Hand in cross cradle; use pillows to lift baby to be belly to belly; ear, shoulder, hip alignment; Support mother's back and place self in comfortable position to support bringing baby to the breast  Shoulders should be down and away from ears  Provided demonstration, education and support of deep latch to breast by placing the nipple to the nose, dragging down to chin to achieve a wide latch  Bring baby to the breast, not breast to baby  Move your shoulders down and away from your ears  Look for ear, shoulder, hip alignment  Baby's upper and lower lip should be flanged on the breast     Information on hand expression given  Discussed benefits of knowing how to manually express breast including stimulating milk supply, softening nipple for latch and evacuating breast in the event of engorgement  Mom is encouraged to place baby skin to skin for feedings  Skin to skin education provided for baby placement on mother's chest, baby only in diaper, blankets below shoulders on baby's back  Skin to skin is encouraged to continue at home for feedings and between feedings  Worked on positioning infant up at chest level and starting to feed infant with nose arriving at the nipple  Then, using areolar compression to achieve a deep latch that is comfortable and exchanges optimum amounts of milk       - Start feedings on breast that last feeding ended   - allow no more than 3 hours between breast feeding sessions   - time between feedings is counted from the beginning of the first feed to the beginning of the next feeding session    Reviewed early signs of hunger, including tensing of hands and shoulders - no need to wait for open eyes  Crying is a late hunger sign  If baby is crying, soothe baby first and then attempt to latch  Reviewed normal sucking patterns: transition from stimulation to nutritive to release or non-nutritive  The goal is to see and hear lots of swallowing  Reviewed normal nursing pattern: infant could latch on one breast up to 30 minutes or until releases on own  Signs of satiation is open hand with fingers that do not grab your finger  Discussed difference in sensation of non-nutritive v nutritive sucking    Met with mother  Provided mother with Ready, Set, Baby booklet  Discussed Skin to Skin contact an benefits to mom and baby  Talked about the delay of the first bath until baby has adjusted  Spoke about the benefits of rooming in  Feeding on cue and what that means for recognizing infant's hunger  Avoidance of pacifiers for the first month discussed  Talked about exclusive breastfeeding for the first 6 months  Positioning and latch reviewed as well as showing images of other feeding positions  Discussed the properties of a good latch in any position  Reviewed hand/manual expression  Discussed s/s that baby is getting enough milk and some s/s that breastfeeding dyad may need further help  Gave information on common concerns, what to expect the first few weeks after delivery, preparing for other caregivers, and how partners can help  Resources for support also provided  Encouraged parents to call for assistance, questions, and concerns about breastfeeding  Extension provided        Beck Garza 9/28/2022 6:20 PM

## 2022-09-29 VITALS
TEMPERATURE: 97.9 F | HEART RATE: 102 BPM | RESPIRATION RATE: 20 BRPM | WEIGHT: 208 LBS | BODY MASS INDEX: 34.66 KG/M2 | DIASTOLIC BLOOD PRESSURE: 64 MMHG | OXYGEN SATURATION: 98 % | HEIGHT: 65 IN | SYSTOLIC BLOOD PRESSURE: 114 MMHG

## 2022-09-29 PROBLEM — Z3A.39 39 WEEKS GESTATION OF PREGNANCY: Status: RESOLVED | Noted: 2022-06-11 | Resolved: 2022-09-29

## 2022-09-29 PROBLEM — O36.63X0 FETAL MACROSOMIA IN PREGNANCY IN THIRD TRIMESTER: Status: RESOLVED | Noted: 2022-09-07 | Resolved: 2022-09-29

## 2022-09-29 PROCEDURE — 99024 POSTOP FOLLOW-UP VISIT: CPT | Performed by: STUDENT IN AN ORGANIZED HEALTH CARE EDUCATION/TRAINING PROGRAM

## 2022-09-29 RX ORDER — IBUPROFEN 600 MG/1
600 TABLET ORAL EVERY 6 HOURS PRN
Qty: 30 TABLET | Refills: 0
Start: 2022-09-29

## 2022-09-29 RX ORDER — ACETAMINOPHEN 325 MG/1
650 TABLET ORAL EVERY 6 HOURS PRN
Refills: 0
Start: 2022-09-29

## 2022-09-29 RX ADMIN — SENNOSIDES 8.6 MG: 8.6 TABLET, FILM COATED ORAL at 08:20

## 2022-09-29 RX ADMIN — IBUPROFEN 600 MG: 600 TABLET ORAL at 04:59

## 2022-09-29 RX ADMIN — DOCUSATE SODIUM 100 MG: 100 CAPSULE, LIQUID FILLED ORAL at 08:19

## 2022-09-29 NOTE — LACTATION NOTE
This note was copied from a baby's chart  Met with mother to follow up and discuss the Breastfeeding Discharge Booklet  Mother discussed that she is having difficulty with latching on left side where her nipple does not charles as far as the right side does  Mother was given latch assist and practiced using it as well as educating use of pillows to elevate breasts that are large  Mother will be given handout on "Breastfeeding with Large Breasts" when she call for a latch assessment that she would like prior to leaving this evening  Discussed feeding log to continue using once home for up to the week ensuring that baby feeds 8-12x in 24 hours and that baby has 6-8 wet diapers as well as 3-4 soiled diapers (looking for stool transition from meconium to a yellow/gold seedy loose stool)  Mother given resources to look up medications to ensure they are safe with breastfeeding, by communicating with the [employer] [address] as well as using [employer] (assisted mother to pin to home screen on personal phone)    Discussed engorgement time frame (when mature milk comes in) and management as well as how to deal with conditions that may occur while breastfeeding (plugged ducts, milk blebs and mastitis) and when is appropriate to communicate with her OB/GYN and/or a lactation consultant  Discussed how to set up a pump, how to cycle (stimulation vs expression phases during a pumping session), milk storage and cleaning  Mother shown handouts for tips on pumping when returning to work and paced bottle feeding (demonstrated)  Mother shown community resources for continued support in breastfeeding once discharged and encouraged to communicate with [employer] and/or a lactation consultant to further assistance once home

## 2022-09-29 NOTE — PLAN OF CARE
Problem: PAIN - ADULT  Goal: Verbalizes/displays adequate comfort level or baseline comfort level  Description: Interventions:  - Encourage patient to monitor pain and request assistance  - Assess pain using appropriate pain scale  - Administer analgesics based on type and severity of pain and evaluate response  - Implement non-pharmacological measures as appropriate and evaluate response  - Consider cultural and social influences on pain and pain management  - Notify physician/advanced practitioner if interventions unsuccessful or patient reports new pain  Outcome: Progressing     Problem: INFECTION - ADULT  Goal: Absence or prevention of progression during hospitalization  Description: INTERVENTIONS:  - Assess and monitor for signs and symptoms of infection  - Monitor lab/diagnostic results  - Monitor all insertion sites, i e  indwelling lines, tubes, and drains  - Monitor endotracheal if appropriate and nasal secretions for changes in amount and color  - [place] appropriate cooling/warming therapies per order  - Administer medications as ordered  - Instruct and encourage patient and family to use good hand hygiene technique  - Identify and instruct in appropriate isolation precautions for identified infection/condition  Outcome: Progressing  Goal: Absence of fever/infection during neutropenic period  Description: INTERVENTIONS:  - Monitor WBC    Outcome: Progressing     Problem: SAFETY ADULT  Goal: Patient will remain free of falls  Description: INTERVENTIONS:  - Educate patient/family on patient safety including physical limitations  - Instruct patient to call for assistance with activity   - Consult OT/PT to assist with strengthening/mobility   - Keep Call bell within reach  - Keep bed low and locked with side rails adjusted as appropriate  - Keep care items and personal belongings within reach  - Initiate and maintain comfort rounds  - Make Fall Risk Sign visible to staff  - Offer Toileting every  Hours, in advance of need  - Initiate/Maintain alarm  - Obtain necessary fall risk management equipment:   - Apply yellow socks and bracelet for high fall risk patients  - Consider moving patient to room near nurses station  Outcome: Progressing  Goal: Maintain or return to baseline ADL function  Description: INTERVENTIONS:  -  Assess patient's ability to carry out ADLs; assess patient's baseline for ADL function and identify physical deficits which impact ability to perform ADLs (bathing, care of mouth/teeth, toileting, grooming, dressing, etc )  - Assess/evaluate cause of self-care deficits   - Assess range of motion  - Assess patient's mobility; develop plan if impaired  - Assess patient's need for assistive devices and provide as appropriate  - Encourage maximum independence but intervene and supervise when necessary  - Involve family in performance of ADLs  - Assess for home care needs following discharge   - Consider OT consult to assist with ADL evaluation and planning for discharge  - Provide patient education as appropriate  Outcome: Progressing  Goal: Maintains/Returns to pre admission functional level  Description: INTERVENTIONS:  - Perform BMAT or MOVE assessment daily    - Set and communicate daily mobility goal to care team and patient/family/caregiver  - Collaborate with rehabilitation services on mobility goals if consulted  - Perform Range of Motion  times a day  - Reposition patient every  hours    - Dangle patient  times a day  - Stand patient  times a day  - Ambulate patient  times a day  - Out of bed to chair  times a day   - Out of bed for meals  times a day  - Out of bed for toileting  - Record patient progress and toleration of activity level   Outcome: Progressing     Problem: DISCHARGE PLANNING  Goal: Discharge to home or other facility with appropriate resources  Description: INTERVENTIONS:  - Identify barriers to discharge w/patient and caregiver  - Arrange for needed discharge resources and transportation as appropriate  - Identify discharge learning needs (meds, wound care, etc )  - Arrange for interpretive services to assist at discharge as needed  - Refer to Case Management Department for coordinating discharge planning if the patient needs post-hospital services based on physician/advanced practitioner order or complex needs related to functional status, cognitive ability, or social support system  Outcome: Progressing     Problem: POSTPARTUM  Goal: Experiences normal postpartum course  Description: INTERVENTIONS:  - Monitor maternal vital signs  - Assess uterine involution and lochia  Outcome: Progressing  Goal: Appropriate maternal -  bonding  Description: INTERVENTIONS:  - Identify family support  - Assess for appropriate maternal/infant bonding   -Encourage maternal/infant bonding opportunities  - Referral to  or  as needed  Outcome: Progressing  Goal: Establishment of infant feeding pattern  Description: INTERVENTIONS:  - Assess breast/bottle feeding  - Refer to lactation as needed  Outcome: Progressing  Goal: Incision(s), wounds(s) or drain site(s) healing without S/S of infection  Description: INTERVENTIONS  - Assess and document dressing, incision, wound bed, drain sites and surrounding tissue  - Provide patient and family education  - Perform skin care/dressing changes every   Outcome: Progressing

## 2022-09-29 NOTE — PROGRESS NOTES
Progress Note - OB/GYN  Matthew Alfaro 32 y o  female MRN: [id number]  Unit/Bed#: -01 Encounter: [id number]    Assessment and Plan     Mathtew Alfaro is a patient of: [facility]  She is PPD# 1 s/p  spontaneous vaginal delivery  Recovering well and is stable       *  (spontaneous vaginal delivery)  Assessment & Plan  Lochia WNL   Recovering well   Appropriate bowel and bladder function   Pain well controlled   Tolerating diet   Breastfeeding: yes   Ambulating without issues   No lower extremity tenderness  GBS negative   Rh positive     Fetal macrosomia in pregnancy in third trimester-resolved as of 2022  Assessment & Plan  Macrosomia (>97%ile)    39 weeks gestation of pregnancy-resolved as of 2022  Assessment & Plan          Disposition    - Anticipate discharge home on PPD# 1 or 2      Subjective/Objective     Chief Complaint: Postpartum State     Subjective:    Matthew Alfaro is PPD/POD#1 s/p  spontaneous vaginal delivery  She has no current complaints  Pain is well controlled  Patient is currently voiding  She is ambulating  Patient is currently passing flatus and has had no bowel movement  She is tolerating PO, and denies nausea or vomitting  Patient denies fever, chills, chest pain, shortness of breath, or calf tenderness  Lochia is normal  She is  Breastfeeding  She is recovering well and is stable         Vitals:   /65 (BP Location: Left arm)   Pulse 95   Temp 98 °F (36 7 °C) (Oral)   Resp 20   Ht 5' 5" (1 651 m)   Wt 94 3 kg (208 lb)   LMP 2021   SpO2 99%   Breastfeeding Yes   BMI 34 61 kg/m²       Intake/Output Summary (Last 24 hours) at 2022 0646  Last data filed at 2022 0319  Gross per 24 hour   Intake --   Output 2213 ml   Net -2213 ml       Invasive Devices  Timeline    Peripheral Intravenous Line  Duration           Peripheral IV 22 Right;Ventral (anterior) Hand 1 day                Physical Exam:   GEN: Timi Locus Rickie Govea appears well, alert and oriented x 3, pleasant and cooperative   CARDIO: RRR, no murmurs or rubs  RESP:  CTAB, no wheezes or rales  ABDOMEN: soft, no tenderness, no distention, fundus @ -2  EXTREMITIES: SCDs on, non tender, no erythema, b/l Garrett's sign negative      Labs:     Hemoglobin   Date Value Ref Range Status   09/27/2022 12 3 11 5 - 15 4 g/dL Final   07/05/2022 12 1 11 5 - 15 4 g/dL Final     WBC   Date Value Ref Range Status   09/27/2022 9 58 4 31 - 10 16 Thousand/uL Final   07/05/2022 10 95 (H) 4 31 - 10 16 Thousand/uL Final     Platelets   Date Value Ref Range Status   09/27/2022 250 149 - 390 Thousands/uL Final   07/05/2022 247 149 - 390 Thousands/uL Final          An Escobedo  9/29/2022  6:46 AM

## 2022-09-29 NOTE — PLAN OF CARE
Problem: PAIN - ADULT  Goal: Verbalizes/displays adequate comfort level or baseline comfort level  Description: Interventions:  - Encourage patient to monitor pain and request assistance  - Assess pain using appropriate pain scale  - Administer analgesics based on type and severity of pain and evaluate response  - Implement non-pharmacological measures as appropriate and evaluate response  - Consider cultural and social influences on pain and pain management  - Notify physician/advanced practitioner if interventions unsuccessful or patient reports new pain  Outcome: Progressing     Problem: INFECTION - ADULT  Goal: Absence or prevention of progression during hospitalization  Description: INTERVENTIONS:  - Assess and monitor for signs and symptoms of infection  - Monitor lab/diagnostic results  - Monitor all insertion sites, i e  indwelling lines, tubes, and drains  - Monitor endotracheal if appropriate and nasal secretions for changes in amount and color  - [place] appropriate cooling/warming therapies per order  - Administer medications as ordered  - Instruct and encourage patient and family to use good hand hygiene technique  - Identify and instruct in appropriate isolation precautions for identified infection/condition  Outcome: Progressing  Goal: Absence of fever/infection during neutropenic period  Description: INTERVENTIONS:  - Monitor WBC    Outcome: Progressing     Problem: SAFETY ADULT  Goal: Patient will remain free of falls  Description: INTERVENTIONS:  - Educate patient/family on patient safety including physical limitations  - Instruct patient to call for assistance with activity   - Consult OT/PT to assist with strengthening/mobility   - Keep Call bell within reach  - Keep bed low and locked with side rails adjusted as appropriate  - Keep care items and personal belongings within reach  - Initiate and maintain comfort rounds  - Make Fall Risk Sign visible to staff  - Offer Toileting every  Hours, in advance of need  - Initiate/Maintain alarm  - Obtain necessary fall risk management equipment:   - Apply yellow socks and bracelet for high fall risk patients  - Consider moving patient to room near nurses station  Outcome: Progressing  Goal: Maintain or return to baseline ADL function  Description: INTERVENTIONS:  -  Assess patient's ability to carry out ADLs; assess patient's baseline for ADL function and identify physical deficits which impact ability to perform ADLs (bathing, care of mouth/teeth, toileting, grooming, dressing, etc )  - Assess/evaluate cause of self-care deficits   - Assess range of motion  - Assess patient's mobility; develop plan if impaired  - Assess patient's need for assistive devices and provide as appropriate  - Encourage maximum independence but intervene and supervise when necessary  - Involve family in performance of ADLs  - Assess for home care needs following discharge   - Consider OT consult to assist with ADL evaluation and planning for discharge  - Provide patient education as appropriate  Outcome: Progressing  Goal: Maintains/Returns to pre admission functional level  Description: INTERVENTIONS:  - Perform BMAT or MOVE assessment daily    - Set and communicate daily mobility goal to care team and patient/family/caregiver  - Collaborate with rehabilitation services on mobility goals if consulted  - Perform Range of Motion  times a day  - Reposition patient every  hours    - Dangle patient  times a day  - Stand patient  times a day  - Ambulate patient  times a day  - Out of bed to chair  times a day   - Out of bed for meals  times a day  - Out of bed for toileting  - Record patient progress and toleration of activity level   Outcome: Progressing     Problem: DISCHARGE PLANNING  Goal: Discharge to home or other facility with appropriate resources  Description: INTERVENTIONS:  - Identify barriers to discharge w/patient and caregiver  - Arrange for needed discharge resources and transportation as appropriate  - Identify discharge learning needs (meds, wound care, etc )  - Arrange for interpretive services to assist at discharge as needed  - Refer to Case Management Department for coordinating discharge planning if the patient needs post-hospital services based on physician/advanced practitioner order or complex needs related to functional status, cognitive ability, or social support system  Outcome: Progressing     Problem: POSTPARTUM  Goal: Experiences normal postpartum course  Description: INTERVENTIONS:  - Monitor maternal vital signs  - Assess uterine involution and lochia  Outcome: Progressing  Goal: Appropriate maternal -  bonding  Description: INTERVENTIONS:  - Identify family support  - Assess for appropriate maternal/infant bonding   -Encourage maternal/infant bonding opportunities  - Referral to  or  as needed  Outcome: Progressing  Goal: Establishment of infant feeding pattern  Description: INTERVENTIONS:  - Assess breast/bottle feeding  - Refer to lactation as needed  Outcome: Progressing  Goal: Incision(s), wounds(s) or drain site(s) healing without S/S of infection  Description: INTERVENTIONS  - Assess and document dressing, incision, wound bed, drain sites and surrounding tissue  - Provide patient and family education  - Perform skin care/dressing changes every   Outcome: Progressing

## 2022-10-04 LAB — PLACENTA IN STORAGE: NORMAL

## 2022-10-10 ENCOUNTER — OFFICE VISIT (OUTPATIENT)
Dept: OBGYN CLINIC | Facility: CLINIC | Age: 27
End: 2022-10-10

## 2022-10-10 VITALS
BODY MASS INDEX: 31.45 KG/M2 | DIASTOLIC BLOOD PRESSURE: 70 MMHG | TEMPERATURE: 97.4 F | WEIGHT: 189 LBS | SYSTOLIC BLOOD PRESSURE: 128 MMHG

## 2022-10-10 DIAGNOSIS — L73.2 HIDRADENITIS SUPPURATIVA: Primary | ICD-10-CM

## 2022-10-10 PROCEDURE — 99024 POSTOP FOLLOW-UP VISIT: CPT | Performed by: CLINICAL NURSE SPECIALIST

## 2022-10-10 NOTE — PROGRESS NOTES
Assessment/Plan:       1  Hidradenitis suppurativa  Comments:  suspect primarily HS flare  will give dicloxicillin to cover both this and possible mastitis but unlikely  Pt to f/u with derm for long term management   Orders:  -     dicloxacillin (DYNAPEN) 500 MG capsule; Take 1 capsule (500 mg total) by mouth 4 (four) times a day for 10 days    2  Postpartum care following vaginal delivery  Comments:  doing well  F/U in 1-2 wks for routine PP checkup            Subjective:      Patient ID: Namrata Quintanilla is a 32 y o  female  She is here for Breast Problem (Patient is trying to stop breastfeeding- baby wasn't gaining any weight and had jaundice  Since then she's been having pinkish streaks and a low grade fever and is taking motrin  No pain in breast  She does have HS and they are starting to come back and bothering her  )    HPI  She is a  who is 13 days PP  S/P  22  Did breastfeed briefly but baby jaundiced and weaning off    Has hx of hidradenitis that prev affected breast area  Since delivery has started to flare  Was managed by dermatology previously  She has since  noted some pink streaking of breasts b/l in addition to HS flare sxs  Concerned for mastitis  sxs started Friday am     Regarding PP status:  Bldg is light  Denies problems wit voiding and stooling  Using stool softener PRN  Feels well mood wise- just fatigue  Has good support at home     Menstrual History:  Patient's last menstrual period was 2021            The following portions of the patient's history were reviewed and updated as appropriate: allergies, current medications, past family history, past medical history, past social history, past surgical history, and problem list     Review of Systems  See HPI for pertinent positives          Objective:    /70 (BP Location: Left arm, Patient Position: Sitting, Cuff Size: Standard)   Temp (!) 97 4 °F (36 3 °C)   Wt 85 7 kg (189 lb)   LMP 2021 Breastfeeding Yes   BMI 31 45 kg/m²      Physical Exam  Constitutional:       General: She is not in acute distress  Appearance: Normal appearance  Genitourinary:      Genitourinary Comments: Pelvic exam deferred     Cardiovascular:      Rate and Rhythm: Normal rate  Pulmonary:      Effort: Pulmonary effort is normal    Chest:       Abdominal:      General: There is no distension  Palpations: Abdomen is soft  Musculoskeletal:         General: Normal range of motion  Neurological:      Mental Status: She is alert and oriented to person, place, and time  Skin:     General: Skin is warm and dry     Psychiatric:         Mood and Affect: Mood normal          Behavior: Behavior normal

## 2022-10-12 ENCOUNTER — TELEPHONE (OUTPATIENT)
Dept: OBGYN CLINIC | Facility: CLINIC | Age: 27
End: 2022-10-12

## 2022-10-12 DIAGNOSIS — L73.2 HIDRADENITIS SUPPURATIVA: Primary | ICD-10-CM

## 2022-10-12 RX ORDER — CLINDAMYCIN HYDROCHLORIDE 300 MG/1
300 CAPSULE ORAL 2 TIMES DAILY
Qty: 28 CAPSULE | Refills: 0 | Status: SHIPPED | OUTPATIENT
Start: 2022-10-12 | End: 2022-10-26

## 2022-10-12 NOTE — TELEPHONE ENCOUNTER
[facility] calling in regards to patient  Realized this is a [facility]LOGA patient  Pharmacy stated the medication: dicloxcillin was ordered for patient and unfortunately the med is on back order and needs a new script usually can be substituted for amoxicillin  Please advise

## 2022-10-14 NOTE — TELEPHONE ENCOUNTER
Patient delivered on 9/28/2022 and baby is currently being seen at [facility] with Dr Wilkerson

## 2022-10-19 ENCOUNTER — POSTPARTUM VISIT (OUTPATIENT)
Dept: OBGYN CLINIC | Facility: CLINIC | Age: 27
End: 2022-10-19

## 2022-10-19 VITALS — BODY MASS INDEX: 31.68 KG/M2 | WEIGHT: 190.4 LBS | DIASTOLIC BLOOD PRESSURE: 84 MMHG | SYSTOLIC BLOOD PRESSURE: 118 MMHG

## 2022-10-19 NOTE — PROGRESS NOTES
Patient is here for postpartum visit    She delivered 9/28/22  Vaginal delivery  Laceration: 2nd degree  Bleeding: spotting  Baby's weight 8lbs/6oz  Gender: male- Ryan George  Breast feed: no, formula  Birth control: undecided

## 2022-10-19 NOTE — PROGRESS NOTES
Mamta Colon  1995    S:  32 y o  female here for postpartum visit  She is now  s/p  22  Gender: male Vivian Clipper)   Apgars: 8 and 8   Weight: 8 lbs 6 6 oz  Her bleeding is minimal and WNL   She is Bottle feeding  She denies postpartum blues/depression  EPDS 7    Last Pap: 21 NILM    We discussed contraceptive options in detail including birth control pills, patches, NuvaRing, DepoProvera, Mirena/Kyleena IUD, Nexplanon in detail  At this point she would like to plan to use condoms  Review of Systems   Respiratory: Negative  Cardiovascular: Negative  Gastrointestinal: Negative for constipation and diarrhea  Genitourinary: Negative for difficulty urinating, pelvic pain, vaginal bleeding, vaginal discharge, itching or odor        Past Medical History:   Diagnosis Date   • 39 weeks gestation of pregnancy 2022    First OB labs - normal Gc/chlamydia - 3/23/22 neg Pap - 21 neg Blue folder - has Genetic screening - Qnatal neg, MSAFP normal  It's a boy - Sonny    COVID vaccine - yes x 3  Level II normal  28 week labs - CBC, RPR normal  1hr gtt abnormal, 3hr gtt normal  Received TDAP  Has breast pump Delivery consent previously signed GBS negative   Maintained on aspirin 162mg po daily thru 36 weeks 32 we   • Fetal macrosomia in pregnancy in third trimester 2022    [facility] recommends considering a 39 IOL IOL request sent     Family History   Problem Relation Age of Onset   • No Known Problems Mother    • Hyperlipidemia Father    • No Known Problems Sister    • No Known Problems Brother    • No Known Problems Maternal Grandmother    • No Known Problems Maternal Grandfather    • Heart disease Paternal Grandmother    • Heart failure Paternal Grandfather    • Hyperlipidemia Paternal Grandfather      Social History     Socioeconomic History   • Marital status: /Civil Union     Spouse name: None   • Number of children: None   • Years of education: None • Highest education level: None   Occupational History   • None   Tobacco Use   • Smoking status: Never Smoker   • Smokeless tobacco: Never Used   Vaping Use   • Vaping Use: Never used   Substance and Sexual Activity   • Alcohol use: Not Currently     Alcohol/week: 1 0 standard drink     Types: 1 Glasses of wine per week     Comment: socially    • Drug use: No   • Sexual activity: Not Currently     Partners: Male   Other Topics Concern   • None   Social History Narrative   • None     Social Determinants of Health     Financial Resource Strain: Not on file   Food Insecurity: Not on file   Transportation Needs: Not on file   Physical Activity: Not on file   Stress: Not on file   Social Connections: Not on file   Intimate Partner Violence: Not on file   Housing Stability: Not on file       O:   /84 (BP Location: Left arm, Patient Position: Sitting, Cuff Size: Standard)   Wt 86 4 kg (190 lb 6 4 oz)   LMP 12/28/2021   Breastfeeding No Comment: formula  BMI 31 68 kg/m²     She appears well and is in no distress  Normocephalic, atraumatic  Abdomen is soft and nontender  External genitals are normal without lesions or rashes  Vagina - small amount of brown blood noted  Wound appears to be healing well with no remaining suture material noted  No focal neurological deficits  Normal mood, affect, and behavior  A/P:  Postpartum visit  Contraception - condoms  Aware to call if desires alternate method  No contraindications to estrogen therapy were reported  Pap due 2024     Reviewed postpartum perineal care and guidelines on resuming sexual and physical activity  Reveiwed return of menses  Advised to call with any issues, all concerns & questions addressed  She will return in 3 months for her yearly exam, sooner PRN

## 2023-01-25 ENCOUNTER — ANNUAL EXAM (OUTPATIENT)
Dept: OBGYN CLINIC | Facility: CLINIC | Age: 28
End: 2023-01-25

## 2023-01-25 VITALS
DIASTOLIC BLOOD PRESSURE: 80 MMHG | HEIGHT: 65 IN | WEIGHT: 186.2 LBS | SYSTOLIC BLOOD PRESSURE: 110 MMHG | BODY MASS INDEX: 31.02 KG/M2

## 2023-01-25 DIAGNOSIS — N91.1 SECONDARY AMENORRHEA: ICD-10-CM

## 2023-01-25 DIAGNOSIS — Z01.419 ROUTINE GYNECOLOGICAL EXAMINATION: Primary | ICD-10-CM

## 2023-01-26 NOTE — PROGRESS NOTES
Assessment   32 y o  Clodanitza Magdaleno presenting for annual exam      Plan   Diagnoses and all orders for this visit:    Routine gynecological examination    Normal findings on routine exam   Encouraged 150 min of exercise per week  Reviewed balanced diet  Multivitamin encouraged   Breast awareness/SBE encouraged     Secondary amenorrhea  -     Prolactin; Future  -     TSH, 3rd generation with Free T4 reflex; Future  -     Follicle stimulating hormone; Future  -     Estradiol; Future  -     hCG, quantitative; Future    Will check labs  Discussed pelvic US pending these results  Pap due   Mammo - baseline due at 36      RTO one year for yearly exam or sooner as needed  __________________________________________________________________    Subjective     Myrna Locke is a 32 y o  Clorinda Rasta presenting for annual exam  She is nearly 4 months s/p  2022  Her son Ifrah Alba is doing well  Denies PP blues/depresson/anxiety  EPDS 4  SCREENING  Last Pap: 2021 NILM  Last Mammo: n/a  Last Colonoscopy: n/a      GYN  Periods-stopped bleeding prior to 6 weeks PP and has not yet gotten menses  Did not breastfeed  Reports negative home UPT this morning  Was on Mirena prior to pregnancy and on pills prior to that  Had two regular periods with having mirena removed prior to conceiving  Dysmenorrhea:none  Cyclic symptoms include none    Sexually active: Yes - single partner - husbands  Contraception: consistent condom use reported - declines alternate method  Hx STI: denies     Hx Abnormal pap: denies  We reviewed ASCCP guidelines for Pap testing today  Gardasil: She has not completed the Gardasil series  Declines today    Denies vaginal discharge, itching, odor, dyspareunia, pelvic pain and vulvar/vaginal symptoms      OB         Complaints: denies   Denies urgency, frequency, hematuria, leakage / change in stream, difficulty urinating         BREAST  Complaints: denies   Denies: breast lump, breast tenderness, nipple discharge, skin color change, and skin lesion(s)  Personal hx: denies       Pertinent Family Hx:   Family hx of breast cancer: no  Family hx of ovarian cancer: no  Family hx of colon cancer: no      GENERAL  PMH reviewed/updated and is as below  Patient does follow with a PCP      Past Medical History:   Diagnosis Date   • 39 weeks gestation of pregnancy 6/11/2022    First OB labs - normal Gc/chlamydia - 3/23/22 neg Pap - 6/8/21 neg Blue folder - has Genetic screening - Qnatal neg, MSAFP normal  It's a boy - Sonny    COVID vaccine - yes x 3  Level II normal  28 week labs - CBC, RPR normal  1hr gtt abnormal, 3hr gtt normal  Received TDAP  Has breast pump Delivery consent previously signed GBS negative   Maintained on aspirin 162mg po daily thru 36 weeks 32 we   • Fetal macrosomia in pregnancy in third trimester 9/7/2022    [facility] recommends considering a 39 IOL IOL request sent       Past Surgical History:   Procedure Laterality Date   • CYST REMOVAL  2016   • WISDOM TOOTH EXTRACTION           Current Outpatient Medications:   •  acetaminophen (TYLENOL) 325 mg tablet, Take 2 tablets (650 mg total) by mouth every 6 (six) hours as needed for mild pain, headaches or fever (Patient not taking: Reported on 10/10/2022), Disp: , Rfl: 0  •  ibuprofen (MOTRIN) 600 mg tablet, Take 1 tablet (600 mg total) by mouth every 6 (six) hours as needed for moderate pain, Disp: 30 tablet, Rfl: 0  •  [id number] (PRENA1 PO), Take by mouth, Disp: , Rfl:     No Known Allergies    Social History     Socioeconomic History   • Marital status: /Civil Union     Spouse name: Not on file   • Number of children: Not on file   • Years of education: Not on file   • Highest education level: Not on file   Occupational History   • Not on file   Tobacco Use   • Smoking status: Never   • Smokeless tobacco: Never   Vaping Use   • Vaping Use: Never used   Substance and Sexual Activity   • Alcohol use: Not Currently     Alcohol/week: 1 0 standard drink     Types: 1 Glasses of wine per week     Comment: socially    • Drug use: No   • Sexual activity: Yes     Partners: Male     Birth control/protection: None, Condom Male   Other Topics Concern   • Not on file   Social History Narrative   • Not on file     Social Determinants of Health     Financial Resource Strain: Not on file   Food Insecurity: Not on file   Transportation Needs: Not on file   Physical Activity: Not on file   Stress: Not on file   Social Connections: Not on file   Intimate Partner Violence: Not on file   Housing Stability: Not on file       Review of Systems     ROS:  Constitutional: Negative for fatigue and unexpected weight change  Respiratory: Negative for cough and shortness of breath  Cardiovascular: Negative for chest pain and palpitations  Gastrointestinal: Negative for abdominal pain and change in bowel habits  Breasts:  Negative, other than as noted above  Genitourinary: Negative, other than as noted above  Psychiatric: Negative for mood difficulties  Objective      /80 (BP Location: Left arm, Patient Position: Sitting, Cuff Size: Standard)   Ht 5' 5" (1 651 m)   Wt 84 5 kg (186 lb 3 2 oz)   LMP  (LMP Unknown)   BMI 30 99 kg/m²     Physical Examination:    Patient appears well and is not in distress  Neck is supple without masses  Breasts are symmetrical without mass, tenderness, nipple discharge, skin changes or adenopathy  Abdomen is soft and nontender without masses  External genitals are normal without lesions or rashes  Urethral meatus and urethra are normal  Bladder is normal to palpation  Vagina is normal without discharge or bleeding  Cervix is normal without discharge or lesion  Uterus is normal, mobile, nontender without palpable mass  Adnexa are normal, nontender, without palpable mass

## 2023-02-02 ENCOUNTER — APPOINTMENT (OUTPATIENT)
Dept: LAB | Facility: MEDICAL CENTER | Age: 28
End: 2023-02-02

## 2023-02-02 DIAGNOSIS — N91.1 SECONDARY AMENORRHEA: ICD-10-CM

## 2023-02-02 LAB
B-HCG SERPL-ACNC: <2 MIU/ML
ESTRADIOL SERPL-MCNC: 92 PG/ML
FSH SERPL-ACNC: 5.5 MIU/ML
PROLACTIN SERPL-MCNC: 12.4 NG/ML
TSH SERPL DL<=0.05 MIU/L-ACNC: 0.58 UIU/ML (ref 0.45–4.5)

## 2023-02-08 DIAGNOSIS — N91.2 AMENORRHEA: Primary | ICD-10-CM

## 2023-02-08 RX ORDER — MEDROXYPROGESTERONE ACETATE 10 MG/1
10 TABLET ORAL DAILY
Qty: 10 TABLET | Refills: 0 | Status: SHIPPED | OUTPATIENT
Start: 2023-02-08

## 2023-02-08 NOTE — RESULT ENCOUNTER NOTE
Please call Tushar Roldan to let her know her labs were normal  I am going to place an order for a medication called provera  She will take one tablet daily for 10 days  She should expect to have a withdrawal bleed in a week or so following completion  Please have her call if she does or does not get bleeding to follow  I know she and  are consistently using condoms, but please have her take a home UPT prior to starting the Provera just to be safe      Thanks,  John Arreola

## 2024-02-09 ENCOUNTER — TELEPHONE (OUTPATIENT)
Dept: OBGYN CLINIC | Facility: CLINIC | Age: 29
End: 2024-02-09

## 2024-02-09 NOTE — TELEPHONE ENCOUNTER
Patient calling in because she had some blood on toilet paper after wiping. She is a little over 7 weeks and is scheduled for an early ultrasound 2/20/24. Please call patient to advise.

## 2024-02-09 NOTE — TELEPHONE ENCOUNTER
Called pt- informed pt , is normal to have spotting , especially in first trimester of pregnancy. Reviewed vascularity of cervix during pregnancy.   Advised to monitor for now, if blding becomes heavy like the beginning of a period, or pelvic pain occurs, to call back.  Pt verbalized understanding.  Has appt scheduled for dating US 2/20.

## 2024-02-20 ENCOUNTER — INITIAL PRENATAL (OUTPATIENT)
Dept: OBGYN CLINIC | Facility: CLINIC | Age: 29
End: 2024-02-20
Payer: COMMERCIAL

## 2024-02-20 VITALS — DIASTOLIC BLOOD PRESSURE: 78 MMHG | SYSTOLIC BLOOD PRESSURE: 120 MMHG | BODY MASS INDEX: 33.28 KG/M2 | WEIGHT: 200 LBS

## 2024-02-20 DIAGNOSIS — N91.1 SECONDARY AMENORRHEA: ICD-10-CM

## 2024-02-20 DIAGNOSIS — R11.0 PREGNANCY RELATED NAUSEA, ANTEPARTUM: ICD-10-CM

## 2024-02-20 DIAGNOSIS — Z34.90 EARLY STAGE OF PREGNANCY: Primary | ICD-10-CM

## 2024-02-20 DIAGNOSIS — O26.899 PREGNANCY RELATED NAUSEA, ANTEPARTUM: ICD-10-CM

## 2024-02-20 PROCEDURE — 99213 OFFICE O/P EST LOW 20 MIN: CPT | Performed by: STUDENT IN AN ORGANIZED HEALTH CARE EDUCATION/TRAINING PROGRAM

## 2024-02-20 PROCEDURE — 76817 TRANSVAGINAL US OBSTETRIC: CPT | Performed by: STUDENT IN AN ORGANIZED HEALTH CARE EDUCATION/TRAINING PROGRAM

## 2024-02-20 RX ORDER — SWAB
1 SWAB, NON-MEDICATED MISCELLANEOUS DAILY
COMMUNITY

## 2024-02-20 NOTE — PROGRESS NOTES
Ultrasound Probe Disinfection    A transvaginal ultrasound was performed.   Prior to use, disinfection was performed with High Level Disinfection Process (Educanon).  Probe serial number.Probe serial number: [id number]    Early stage of pregnancy  27yo  at 7.1wks by BSUS today, not consistent with LMP, SVITLANA 10/7/24, presents for dating US     Pt denies vaginal bleeding or pelvic cramping.     -continue PNVs   -bleeding and infectious precautions provided   -return for [facility] US and initial prenatal visit     Pregnancy related nausea, antepartum  -vit B6 and unisom recommended     ROS: denies headaches, changes in vision, chest pain, palpitations, shortness of breath, nausea, vomiting, fevers, chills     PE:  General: alert and oriented, resting comfortably, no acute distress  Cardiac: regular rate  Pulmonary: no respiratory distress  Abdomen: soft, nondistended, no rebound or guarding, nontender   : external vulva without lesions, redness, tenderness   Extremities: no edema or calf tenderness bilaterally

## 2024-02-20 NOTE — ASSESSMENT & PLAN NOTE
27yo  at 7.1wks by BSUS today, not consistent with LMP, SVITLANA 10/7/24, presents for dating US     Pt denies vaginal bleeding or pelvic cramping.     -continue PNVs   -bleeding and infectious precautions provided   -return for M US and initial prenatal visit

## 2024-02-20 NOTE — PROGRESS NOTES
Information obtained from chart review. Did not directly ask patient. Will readdress at first prenatal vsiti.

## 2024-02-29 ENCOUNTER — TELEPHONE (OUTPATIENT)
Facility: HOSPITAL | Age: 29
End: 2024-02-29

## 2024-02-29 NOTE — TELEPHONE ENCOUNTER
Left voicemail for patient requesting she call back to reschedule her Nuchal that is currently scheduled for 3/15 at 8am in our [place] office. This date is early for the preferred time frame of 12-13.6 weeks in the pregnancy. With an SVITLANA of 10/7/24 the ideal time frame to schedule is 3/25/24-4/7/24.

## 2024-03-14 ENCOUNTER — INITIAL PRENATAL (OUTPATIENT)
Dept: OBGYN CLINIC | Facility: CLINIC | Age: 29
End: 2024-03-14

## 2024-03-14 DIAGNOSIS — Z34.81 PRENATAL CARE, SUBSEQUENT PREGNANCY, FIRST TRIMESTER: Primary | ICD-10-CM

## 2024-03-14 DIAGNOSIS — Z36.9 UNSPECIFIED ANTENATAL SCREENING: ICD-10-CM

## 2024-03-14 DIAGNOSIS — Z31.430 ENCOUNTER OF FEMALE FOR TESTING FOR GENETIC DISEASE CARRIER STATUS FOR PROCREATIVE MANAGEMENT: ICD-10-CM

## 2024-03-14 PROCEDURE — OBC

## 2024-03-14 NOTE — PROGRESS NOTES
OB INTAKE INTERVIEW  Patient is 28 y.o.y.o. who presents for OB intake at 10-3 wks  She is accompanied by phone during this encounter  The father of her baby (Rory) is involved in the pregnancy and is 30  years old, & they are .       Last Menstrual Period: 23  Ultrasound: Measured 7 weeks 1 days on 24  Estimated Date of Delivery: 10/7/24 via US    Signs/Symptoms of Pregnancy  Current pregnancy symptoms: nausea- reviewed B6/Unisom  Constipation YES- slight- recommended colace.  Headaches no  Cramping/spotting YES- x 1  PICA cravings no    Diabetes-    If patient has 1 or more, please order early 1 hour GTT  History of GDM no  BMI >35 no  History of PCOS or current metformin use no  History of LGA/macrosomic infant (4000g/9lbs) no    If patient has 2 or more, please order early 1 hour GTT  BMI>30 YES  AMA no  First degree relative with type 2 diabetes no  History of chronic HTN, hyperlipidemia, elevated A1C no  High risk race (, , ,  or ) no    Hypertension- if you answer yes to any of the following, please order baseline preeclampsia labs (cbc, comprehensive metabolic panel, urine protein creatinine ratio, uric acid)  History of of chronic HTN no  History of gestational HTN no  History of preeclampsia, eclampsia, or HELLP syndrome no  History of diabetes no  History of lupus, autoimmune disease, kidney disease no    Thyroid- if yes order TSH with reflex T4  History of thyroid disease no    Bleeding Disorder or Hx of DVT-patient or first degree relative with history of. Order the following if not done previously.   (Factor V, antithrombin III, prothrombin gene mutation, protein C and S Ag, lupus anticoagulant, anticardiolipin, beta-2 glycoprotein)   no    OB/GYN-  History of abnormal pap smear no       Date of last pap smear 2021 wnl  History of HPV no  History of Herpes/HSV no  History of other STI (gonorrhea, chlamydia, trich)  no  History of prior  YES  History of prior  no  History of  delivery prior to 36 weeks 6 days no  History of blood transfusion no  Ok for blood transfusion yes    Substance screening- if yes outside of tobacco for her or anyone in her home-order urine drug screen  History of tobacco use no  Currently using tobacco no  Currently using alcohol no  Presently using drugs no  Past drug use  no  IV drug use- no  Partner drug use no  Parent/Family drug use no    MRSA Screening-   Does the pt have a hx of MRSA? no    Immunizations:  Influenza vaccine given this season yes  Discussed Tdap vaccine yes  Discussed COVID Vaccine yes    Genetic/MFM-  Do you or your partner have a history of any of the following in yourselves or first degree relatives?  Cystic fibrosis no  Spinal muscular atrophy no  Hemoglobinopathy/Sickle Cell/Thalassemia no  Fragile X Intellectual Disability no    If yes, discuss Carrier Screening and recommend consultation with MFM/Genetic Counseling and place specific [facility] Referral for.    If no, discuss Carrier Screening being completed once in a lifetime as a standard of care lab test. Place orders for Cystic Fibrosis Gene Test (XND406) and Spinal Muscular Atrophy DNA ([id number])      Appointment for Nuchal Translucency Ultrasound at [facility] scheduled for 3/29/24    Interview education  [facility] Pregnancy Essentials Book reviewed, discussed and attached to their AVS yes    Nurse/Family Partnership- patient may qualify no; referral placed no    Prenatal lab work scripts yes  Extra labs ordered:  CF, SMA    Aspirin/Preeclampsia Screen    Risk Level Risk Factor Recommendation   LOW Prior Uncomplicated full-term delivery YES No Aspirin recommendation        MODERATE Nulliparity no Recommend low-dose aspirin if     BMI>30 YES 2 or more moderate risk factors    Family History Preeclampsia (mother/sister) no     35yr old or greater no      or Low Socioeconomic no     IVF Pregnancy  no      Personal History Risks (low birth weight, prior adverse preg outcome, >10yr preg interval) no         HIGH History of Preeclampsia no Recommend low-dose aspirin if     Multifetal gestation no 1 or more high risk factors    Chronic HTN no     Type 1 or 2 Diabetes no     Renal Disease no     Autoimmune Disease  no      Contraindications to ASA therapy:  NSAID/ ASA allergy: no  Nasal polyps: no  Asthma with history of ASA induced bronchospasm: no  Relative contraindications:  History of GI bleed: no  Active peptic ulcer disease: no  Severe hepatic dysfunction: no    Patient should be recommended to take ASA 162mg during this pregnancy from 12-36wks to lower her risk of preeclampsia: no      The patient has a history now or in prior pregnancy notable for:  , Hx of Vag Bernadine,  BMI 33.2,  B+,  Nausea,         Details that I feel the provider should be aware of: Pt requested CF & SMA testing- order placed in epic.       PN1 visit scheduled. The patient was oriented to our practice, reviewed delivering physicians and [facility] for Delivery. All questions were answered. PN phone interview completed.  Pt & , Rory, happy with pregnancy.  PN bldwk ordered in epic, including a CF & SMA.  Advised pt to await authorization p.c. prior to having bldwk drawn.  Pt has appts scheduled for PN1, 3/28 & PNC, 3/29/24.  Advised pt to call with any further questions/concerns.       Interviewed by: Nuha Plata RN, CLC

## 2024-03-14 NOTE — PATIENT INSTRUCTIONS
Congratulations!! Please review our Pregnancy Essential Guide and [facility] L&D Virtual tour from our networks website.     [facility] Pregnancy Essentials Guide  [facility] ([email or web address])     [facility] - Virtual Tour ([email or web address])        Pregnancy at 11 to 14 Weeks   AMBULATORY CARE:   Changes happening to your body:  You are now at the end of your first trimester and entering your second trimester. Morning sickness usually goes away by this time. You may have other symptoms such as fatigue, frequent urination, and headaches. You may have gained 2 to 4 pounds by now.  Seek care immediately if:   You have pain or cramping in your abdomen or low back.    You have heavy vaginal bleeding or clotting.    You pass material that looks like tissue or large clots. Collect the material and bring it with you.    Call your doctor or obstetrician if:   You cannot keep food or drinks down, and you are losing weight.    You have light vaginal bleeding.    You have chills or a fever.    You have vaginal itching, burning, or pain.    You have yellow, green, white, or foul-smelling vaginal discharge.    You have pain or burning when you urinate, less urine than usual, or pink or bloody urine.    You have questions or concerns about your condition or care.    How to care for yourself at this stage of your pregnancy:       Get plenty of rest.  You may feel more tired than normal. You may need to take naps or go to bed earlier.    Manage nausea and vomiting.  Avoid fatty and spicy foods. Eat small meals throughout the day instead of large meals. Gabi may help to decrease nausea. Ask your healthcare provider about other ways of decreasing nausea and vomiting.    Eat a variety of healthy foods.  Healthy foods include fruits, vegetables, whole-grain breads, low-fat dairy foods, beans, lean meats, and fish. Drink liquids as directed. Ask how much liquid to drink each day and which liquids are best for you.  Limit caffeine to less than 200 milligrams each day. Limit your intake of fish to 2 servings each week. Choose fish low in mercury such as canned light tuna, shrimp, salmon, cod, or tilapia. Do not  eat fish high in mercury such as swordfish, tilefish, chio mackerel, and shark.         Take prenatal vitamins as directed.  Your need for certain vitamins and minerals, such as folic acid, increases during pregnancy. Prenatal vitamins provide some of the extra vitamins and minerals you need. Prenatal vitamins may also help to decrease the risk of certain birth defects.         Do not smoke.  Smoking increases your risk of a miscarriage and other health problems during your pregnancy. Smoking can cause your baby to be born too early or weigh less at birth. Ask your healthcare provider for information if you need help quitting.    Do not drink alcohol.  Alcohol passes from your body to your baby through the placenta. It can affect your baby's brain development and cause fetal alcohol syndrome (FAS). FAS is a group of conditions that causes mental, behavior, and growth problems.    Talk to your healthcare provider before you take any medicines.  Many medicines may harm your baby if you take them when you are pregnant. Do not take any medicines, vitamins, herbs, or supplements without first talking to your healthcare provider. Never use illegal or street drugs (such as marijuana or cocaine) while you are pregnant.  Safety tips during pregnancy:   Avoid hot tubs and saunas.  Do not use a hot tub or sauna while you are pregnant, especially during your first trimester. Hot tubs and saunas may raise your baby's temperature and increase the risk of birth defects.    Avoid toxoplasmosis.  This is an infection caused by eating raw meat or being around infected cat feces. It can cause birth defects, miscarriages, and other problems. Wash your hands after you touch raw meat. Make sure any meat is well-cooked before you eat it. Avoid  raw eggs and unpasteurized milk. Use gloves or ask someone else to clean your cat's litter box while you are pregnant.    Changes happening with your baby:  Your baby has fully formed fingernails and toenails. Your baby's heartbeat can now be heard. Ask your healthcare provider if you can listen to your baby's heartbeat. By week 14, your baby is over 4 inches long from the top of the head to the rump (baby's bottom). Your baby weighs over 3 ounces.  Prenatal care:  Prenatal care is a series of visits with your healthcare provider throughout your pregnancy. During the first 28 weeks of your pregnancy, you will see your healthcare provider 1 time each month. Prenatal care can help prevent problems during pregnancy and childbirth. Your healthcare provider will check your blood pressure and weight. Your baby's heart rate will also be checked. You may also need the following at some visits:  A pelvic exam  allows your healthcare provider to see your cervix (the bottom part of your uterus). Your healthcare provider will use a speculum to open your vagina. He or she will check the size and shape of your uterus.    Blood tests  may be done to check for any of the following:    Gestational diabetes or anemia (low iron level)    Blood type or Rh factor, or certain birth defects    Immunity to certain diseases, such as chickenpox or rubella    An infection, such as a sexually transmitted infection, HIV, or hepatitis B    Hepatitis B  may need to be prevented or treated. Hepatitis B is inflammation of the liver caused by the hepatitis B virus (HBV). HBV can spread from a mother to her baby during delivery. You will be checked for HBV as early as possible in the first trimester of each pregnancy. You need the test even if you received the hepatitis B vaccine or were tested before. You may need to have an HBV infection treated before you give birth.    Urine tests  may also be done to check for sugar and protein. These can be  signs of gestational diabetes or preeclampsia. Urine tests may also be done to check for signs of infection.    A fetal ultrasound  shows pictures of your baby inside your uterus. The pictures are used to check your baby's development, movement, and position.         Genetic disorder screening tests  may be offered to you. These tests check your baby's risk for genetic disorders such as Down syndrome. A screening test includes a blood test and ultrasound.    Follow up with your doctor or obstetrician as directed:  Go to all prenatal visits. Write down your questions so you remember to ask them during your visits.  © Copyright [employer] 2023 Information is for End User's use only and may not be sold, redistributed or otherwise used for commercial purposes.  The above information is an  only. It is not intended as medical advice for individual conditions or treatments. Talk to your doctor, nurse or pharmacist before following any medical regimen to see if it is safe and effective for you.

## 2024-03-21 ENCOUNTER — TELEPHONE (OUTPATIENT)
Age: 29
End: 2024-03-21

## 2024-03-21 NOTE — TELEPHONE ENCOUNTER
Patient called stating that she was supposed to hear back from us in regards to genetic testing and BW that was discussed at her last appointment. It was supposed to be authorized through the insurance.

## 2024-03-25 ENCOUNTER — APPOINTMENT (OUTPATIENT)
Dept: LAB | Facility: CLINIC | Age: 29
End: 2024-03-25
Payer: COMMERCIAL

## 2024-03-25 DIAGNOSIS — Z34.81 PRENATAL CARE, SUBSEQUENT PREGNANCY, FIRST TRIMESTER: ICD-10-CM

## 2024-03-25 DIAGNOSIS — Z36.9 UNSPECIFIED ANTENATAL SCREENING: ICD-10-CM

## 2024-03-25 DIAGNOSIS — Z31.430 ENCOUNTER OF FEMALE FOR TESTING FOR GENETIC DISEASE CARRIER STATUS FOR PROCREATIVE MANAGEMENT: ICD-10-CM

## 2024-03-25 LAB
ABO GROUP BLD: NORMAL
BACTERIA UR QL AUTO: ABNORMAL /HPF
BASOPHILS # BLD AUTO: 0.05 THOUSANDS/ÂΜL (ref 0–0.1)
BASOPHILS NFR BLD AUTO: 1 % (ref 0–1)
BILIRUB UR QL STRIP: NEGATIVE
BLD GP AB SCN SERPL QL: NEGATIVE
CLARITY UR: CLEAR
COLOR UR: YELLOW
EOSINOPHIL # BLD AUTO: 0.17 THOUSAND/ÂΜL (ref 0–0.61)
EOSINOPHIL NFR BLD AUTO: 2 % (ref 0–6)
ERYTHROCYTE [DISTWIDTH] IN BLOOD BY AUTOMATED COUNT: 13 % (ref 11.6–15.1)
GLUCOSE UR STRIP-MCNC: NEGATIVE MG/DL
HBV SURFACE AG SER QL: NORMAL
HCT VFR BLD AUTO: 39.5 % (ref 34.8–46.1)
HCV AB SER QL: NORMAL
HGB BLD-MCNC: 13.1 G/DL (ref 11.5–15.4)
HGB UR QL STRIP.AUTO: NEGATIVE
HIV 1+2 AB+HIV1 P24 AG SERPL QL IA: NORMAL
HIV 2 AB SERPL QL IA: NORMAL
HIV1 AB SERPL QL IA: NORMAL
HIV1 P24 AG SERPL QL IA: NORMAL
IMM GRANULOCYTES # BLD AUTO: 0.02 THOUSAND/UL (ref 0–0.2)
IMM GRANULOCYTES NFR BLD AUTO: 0 % (ref 0–2)
KETONES UR STRIP-MCNC: NEGATIVE MG/DL
LEUKOCYTE ESTERASE UR QL STRIP: ABNORMAL
LYMPHOCYTES # BLD AUTO: 1.4 THOUSANDS/ÂΜL (ref 0.6–4.47)
LYMPHOCYTES NFR BLD AUTO: 17 % (ref 14–44)
MCH RBC QN AUTO: 27 PG (ref 26.8–34.3)
MCHC RBC AUTO-ENTMCNC: 33.2 G/DL (ref 31.4–37.4)
MCV RBC AUTO: 81 FL (ref 82–98)
MONOCYTES # BLD AUTO: 0.39 THOUSAND/ÂΜL (ref 0.17–1.22)
MONOCYTES NFR BLD AUTO: 5 % (ref 4–12)
MUCOUS THREADS UR QL AUTO: ABNORMAL
NEUTROPHILS # BLD AUTO: 6.11 THOUSANDS/ÂΜL (ref 1.85–7.62)
NEUTS SEG NFR BLD AUTO: 75 % (ref 43–75)
NITRITE UR QL STRIP: NEGATIVE
NON-SQ EPI CELLS URNS QL MICRO: ABNORMAL /HPF
NRBC BLD AUTO-RTO: 0 /100 WBCS
PH UR STRIP.AUTO: 5.5 [PH]
PLATELET # BLD AUTO: 238 THOUSANDS/UL (ref 149–390)
PMV BLD AUTO: 9.1 FL (ref 8.9–12.7)
PROT UR STRIP-MCNC: ABNORMAL MG/DL
RBC # BLD AUTO: 4.85 MILLION/UL (ref 3.81–5.12)
RBC #/AREA URNS AUTO: ABNORMAL /HPF
RH BLD: POSITIVE
RUBV IGG SERPL IA-ACNC: 33.6 IU/ML
SP GR UR STRIP.AUTO: 1.03 (ref 1–1.03)
SPECIMEN EXPIRATION DATE: NORMAL
TREPONEMA PALLIDUM IGG+IGM AB [PRESENCE] IN SERUM OR PLASMA BY IMMUNOASSAY: NORMAL
UROBILINOGEN UR STRIP-ACNC: <2 MG/DL
WBC # BLD AUTO: 8.14 THOUSAND/UL (ref 4.31–10.16)
WBC #/AREA URNS AUTO: ABNORMAL /HPF

## 2024-03-25 PROCEDURE — 87086 URINE CULTURE/COLONY COUNT: CPT

## 2024-03-25 PROCEDURE — 87340 HEPATITIS B SURFACE AG IA: CPT

## 2024-03-25 PROCEDURE — 81329 SMN1 GENE DOS/DELETION ALYS: CPT

## 2024-03-25 PROCEDURE — 86850 RBC ANTIBODY SCREEN: CPT

## 2024-03-25 PROCEDURE — 85025 COMPLETE CBC W/AUTO DIFF WBC: CPT

## 2024-03-25 PROCEDURE — 86803 HEPATITIS C AB TEST: CPT

## 2024-03-25 PROCEDURE — 36415 COLL VENOUS BLD VENIPUNCTURE: CPT

## 2024-03-25 PROCEDURE — 86900 BLOOD TYPING SEROLOGIC ABO: CPT

## 2024-03-25 PROCEDURE — 81001 URINALYSIS AUTO W/SCOPE: CPT

## 2024-03-25 PROCEDURE — 86762 RUBELLA ANTIBODY: CPT

## 2024-03-25 PROCEDURE — 86901 BLOOD TYPING SEROLOGIC RH(D): CPT

## 2024-03-25 PROCEDURE — 86780 TREPONEMA PALLIDUM: CPT

## 2024-03-25 PROCEDURE — 87389 HIV-1 AG W/HIV-1&-2 AB AG IA: CPT

## 2024-03-27 PROBLEM — Z3A.12 12 WEEKS GESTATION OF PREGNANCY: Status: ACTIVE | Noted: 2024-02-20

## 2024-03-27 LAB — BACTERIA UR CULT: NORMAL

## 2024-03-27 NOTE — PATIENT INSTRUCTIONS
Kegel Exercises for Women   AMBULATORY CARE:   Kegel exercises  help strengthen your pelvic muscles. Pelvic muscles hold your pelvic organs, such as your bladder and uterus, in place. Kegel exercises help prevent or control certain conditions, such as urine incontinence (leakage) or uterine prolapse.       Call your doctor or physical therapist if:   You cannot feel your muscles tighten or relax.    You continue to leak urine.    You have questions or concerns about your condition or care.    Use the correct muscles:  Pelvic muscles are the muscles you use to control urine flow. To target these muscles, stop and start the flow of urine several times. This will help you become familiar with how it feels to tighten and relax these muscles.  How to do Kegel exercises:   Get into a comfortable position.  You may lie down, stand up, or sit down to do these exercises. When you first try to do these exercises, it may be easier if you lie down.    Tighten or squeeze your pelvic muscles slowly.  It may feel like you are trying to hold back urine or gas. Hold this position for 3 seconds. Relax for 3 seconds. Repeat this cycle 10 times. Do not hold your breath when you do Kegel exercises. Keep your stomach, back, and leg muscles relaxed.    Do 10 sets of Kegel exercises, at least 3 times a day.  When you know how to do Kegel exercises, use different positions. This will help to strengthen your pelvic muscles as much as possible. You can do these exercises while you lie on the floor, watch TV, or while you stand. Tighten your pelvic muscles before you sneeze, cough, or lift to prevent urine leakage. You may notice improved bladder control within about 6 weeks.    Follow up with your doctor or physical therapist as directed:  Write down your questions so you remember to ask them during your visits.  © Copyright [employer] 2023 Information is for End User's use only and may not be sold, redistributed or otherwise used for  commercial purposes.  The above information is an  only. It is not intended as medical advice for individual conditions or treatments. Talk to your doctor, nurse or pharmacist before following any medical regimen to see if it is safe and effective for you.  Pregnancy at 11 to 14 Weeks   AMBULATORY CARE:   Changes happening to your body:  You are now at the end of your first trimester and entering your second trimester. Morning sickness usually goes away by this time. You may have other symptoms such as fatigue, frequent urination, and headaches. You may have gained 2 to 4 pounds by now.  Seek care immediately if:   You have pain or cramping in your abdomen or low back.    You have heavy vaginal bleeding or clotting.    You pass material that looks like tissue or large clots. Collect the material and bring it with you.    Call your doctor or obstetrician if:   You cannot keep food or drinks down, and you are losing weight.    You have light vaginal bleeding.    You have chills or a fever.    You have vaginal itching, burning, or pain.    You have yellow, green, white, or foul-smelling vaginal discharge.    You have pain or burning when you urinate, less urine than usual, or pink or bloody urine.    You have questions or concerns about your condition or care.    How to care for yourself at this stage of your pregnancy:       Get plenty of rest.  You may feel more tired than normal. You may need to take naps or go to bed earlier.    Manage nausea and vomiting.  Avoid fatty and spicy foods. Eat small meals throughout the day instead of large meals. Gabi may help to decrease nausea. Ask your healthcare provider about other ways of decreasing nausea and vomiting.    Eat a variety of healthy foods.  Healthy foods include fruits, vegetables, whole-grain breads, low-fat dairy foods, beans, lean meats, and fish. Drink liquids as directed. Ask how much liquid to drink each day and which liquids are best for  you. Limit caffeine to less than 200 milligrams each day. Limit your intake of fish to 2 servings each week. Choose fish low in mercury such as canned light tuna, shrimp, salmon, cod, or tilapia. Do not  eat fish high in mercury such as swordfish, tilefish, chio mackerel, and shark.         Take prenatal vitamins as directed.  Your need for certain vitamins and minerals, such as folic acid, increases during pregnancy. Prenatal vitamins provide some of the extra vitamins and minerals you need. Prenatal vitamins may also help to decrease the risk of certain birth defects.         Do not smoke.  Smoking increases your risk of a miscarriage and other health problems during your pregnancy. Smoking can cause your baby to be born too early or weigh less at birth. Ask your healthcare provider for information if you need help quitting.    Do not drink alcohol.  Alcohol passes from your body to your baby through the placenta. It can affect your baby's brain development and cause fetal alcohol syndrome (FAS). FAS is a group of conditions that causes mental, behavior, and growth problems.    Talk to your healthcare provider before you take any medicines.  Many medicines may harm your baby if you take them when you are pregnant. Do not take any medicines, vitamins, herbs, or supplements without first talking to your healthcare provider. Never use illegal or street drugs (such as marijuana or cocaine) while you are pregnant.  Safety tips during pregnancy:   Avoid hot tubs and saunas.  Do not use a hot tub or sauna while you are pregnant, especially during your first trimester. Hot tubs and saunas may raise your baby's temperature and increase the risk of birth defects.    Avoid toxoplasmosis.  This is an infection caused by eating raw meat or being around infected cat feces. It can cause birth defects, miscarriages, and other problems. Wash your hands after you touch raw meat. Make sure any meat is well-cooked before you eat it.  Avoid raw eggs and unpasteurized milk. Use gloves or ask someone else to clean your cat's litter box while you are pregnant.    Changes happening with your baby:  Your baby has fully formed fingernails and toenails. Your baby's heartbeat can now be heard. Ask your healthcare provider if you can listen to your baby's heartbeat. By week 14, your baby is over 4 inches long from the top of the head to the rump (baby's bottom). Your baby weighs over 3 ounces.  Prenatal care:  Prenatal care is a series of visits with your healthcare provider throughout your pregnancy. During the first 28 weeks of your pregnancy, you will see your healthcare provider 1 time each month. Prenatal care can help prevent problems during pregnancy and childbirth. Your healthcare provider will check your blood pressure and weight. Your baby's heart rate will also be checked. You may also need the following at some visits:  A pelvic exam  allows your healthcare provider to see your cervix (the bottom part of your uterus). Your healthcare provider will use a speculum to open your vagina. He or she will check the size and shape of your uterus.    Blood tests  may be done to check for any of the following:    Gestational diabetes or anemia (low iron level)    Blood type or Rh factor, or certain birth defects    Immunity to certain diseases, such as chickenpox or rubella    An infection, such as a sexually transmitted infection, HIV, or hepatitis B    Hepatitis B  may need to be prevented or treated. Hepatitis B is inflammation of the liver caused by the hepatitis B virus (HBV). HBV can spread from a mother to her baby during delivery. You will be checked for HBV as early as possible in the first trimester of each pregnancy. You need the test even if you received the hepatitis B vaccine or were tested before. You may need to have an HBV infection treated before you give birth.    Urine tests  may also be done to check for sugar and protein. These can  "be signs of gestational diabetes or preeclampsia. Urine tests may also be done to check for signs of infection.    A fetal ultrasound  shows pictures of your baby inside your uterus. The pictures are used to check your baby's development, movement, and position.         Genetic disorder screening tests  may be offered to you. These tests check your baby's risk for genetic disorders such as Down syndrome. A screening test includes a blood test and ultrasound.    Follow up with your doctor or obstetrician as directed:  Go to all prenatal visits. Write down your questions so you remember to ask them during your visits.  ©  Mer2023 Information is for End User's use only and may not be sold, redistributed or otherwise used for commercial purposes.  The above information is an  only. It is not intended as medical advice for individual conditions or treatments. Talk to your doctor, nurse or pharmacist before following any medical regimen to see if it is safe and effective for you.  Valuable Online Resource:    [facility] pregnancy essential guide    [email or web address]://[email or web address].[email or web address]/womens/[email or web address]-[email or web address]-guide      On the right side of the screen is a 50 page guide providing valuable information about your entire pregnancy.    On the left hand side of the site you will see several other links to great information and resources that [facility] offers     If you click on the tab that says \"Pregnancy and Birth Packet\" this opens another  guide to labor and delivery information as well as breast feeding information,  care, pediatricians, car seat safety and much more     The [facility]'s [facility] tab has a virtual tour of the new L&D unit, as well as valuable information about classes that are offered, breast feeding support, support groups and much more.     I highly recommend the virtual Breast Feeding class, very informational even if you have breast fed in the past. Check for " available dates !    Click around and enjoy all we have to offer!    Please note that all information in regards to locations and visiting hours have not been updated due to COVID    Your delivery location is [facility] @ [address] [place] [address]         Maternal Fetal Medicine     Nuchal Translucency ( NT) ultrasound is offered in the first trimester of pregnancy to assess your developing baby and look for any markers that may indicate a risk for certain chromosomal conditions such as Down's syndrome.  This ultrasound is offered to all pregnant women along with several options for blood screening.     During your ultrasound appointment the Perinatologist will discuss these options and together you can decide which screen is best for you.  We encourage you to view the following video prior to your appointment to learn more:  [email or web address]     Please check your individual insurance plan to determine if the screening is covered, requires prior authorization or if you will incur any out of pocket cost.   It is also important to know if your insurance company has a preferred in network lab such as [facility] or [facility].     Below is list of CPT codes for blood screening options.   CPT codes are procedure codes that tell your insurance company what testing is being done.     In order for testing to be performed in a timely and efficient manner it is best if you have this information available before your first visit with our office.  Please note, [facility]'s genetic testing division is called [facility].     Sequential Screen - 2 part screen, CPT codes are dependent on the lab used:     [facility]    Part 1 code 85317,79381      Part 2 code 09136,14702,92179, 63009     [facility]            Part 1 code  66514                Part 2 code 02690        NIPT (cell free DNA testing)  CPT code 80738        NIPT testing through [facility]/  [facility] is called [id number].   Please use the  @ [email or web address]   > click estimate my cost>  pregnancy>   [id number] plus  OR call # [phone] and speak to a .   Be sure to ask about the Every MOMS Pledge program for additional financial assistance.     NIPT testing through [facility] is called [facility].  Please use the  @ [email or web address].     If you have any questions please feel free to reach out to our office at [phone].  Genetic Counseling appointments are available for any patient but strongly encouraged for Moms 35 or older or patients with family history of genetic disorders. Froilan Malcolm Contractions   AMBULATORY CARE:   Froilan Malcolm contractions  are tightening and squeezing of the muscles of your uterus (womb) during pregnancy. The uterine muscles control the uterus. Alpine Malcolm contractions stop on their own. They are not true labor contractions and do not cause your cervix (opening to your uterus) to dilate (open).  Common symptoms include the following:   Pain or discomfort in your groin or lower abdomen that comes and goes    Your contractions are short, and do not last longer each time they happen    Your contractions do not get closer together each time    Your contractions do not get stronger or more painful each time    Your contractions stop when you change your position or rest    Seek care immediately if:   You have bleeding from your vagina.    You have fluid leaking from your vagina that does not stop.    You feel a gush of fluid from your vagina.    Your contractions happen every 5 minutes or sooner, and last for more than 60 seconds.    Your contractions begin to feel stronger or more painful.    You feel a change in your baby's movement, or you feel fewer than 6 to 10 movements in an hour.    Call your doctor or obstetrician if:   You have a fever.    You have questions or concerns about your  condition or care.    Treatment for Froilan Malcolm contractions  may include pain medicine to relieve discomfort or pain or sedatives to relax the muscles of your uterus. If you are dehydrated, he or she may give you fluids through an IV or tell you to drink liquids.  Self-care:   Change your activity or your position  when you feel contractions begin. Walk if you have been lying or sitting. Lie down if you have been standing or walking. True labor will not stop by changing your position or activity.    Take a warm bath  to relax your body.    Drink more liquids  to prevent dehydration. Ask how much liquid to drink each day and which liquids are best for you.    Practice your labor breathing  to decrease your discomfort. This may help you get ready for true labor. Take slow, deep breaths, or fast, short breaths. Ask your healthcare provider how to practice labor breathing.    Follow up with your doctor or obstetrician as directed:  Write down your questions so you remember to ask them during your visits.  © Copyright [employer] 2023 Information is for End User's use only and may not be sold, redistributed or otherwise used for commercial purposes.  The above information is an  only. It is not intended as medical advice for individual conditions or treatments. Talk to your doctor, nurse or pharmacist before following any medical regimen to see if it is safe and effective for you.  Round Ligament Pain   WHAT YOU NEED TO KNOW:   What is round ligament pain?  Round ligament pain is caused when ligaments are stretched as your uterus (womb) gets bigger during pregnancy. Round ligaments are found on each side of your uterus. They are bands of tissue that hold the uterus in place. Round ligament pain happens most often during the second trimester. It is a normal part of pregnancy and should stop by the third trimester. The pain is not serious and will not hurt your baby.       What are the signs and symptoms of  round ligament pain?   Pain on one or both sides of your lower abdomen or groin that may move up to your hip    Spasms in the muscles in your abdomen    Pain that lasts a few seconds    Pain that happens when you exercise, sneeze, change positions, or stand quickly    How is round ligament pain diagnosed?  Your healthcare provider will examine you and ask about your pain. Tell the provider when the pain started, and if you feel it on one or both sides. Your provider may ask if anything helps the pain or makes it worse.   What can I do to manage my pain?  Round ligament pain does not need to be treated. The following may help make you more comfortable:  Rest as often as you can.  Rest can help relieve round ligament pain. You might want to lie on the side that has pain. Place a pillow under your abdomen. Keep another pillow between your knees.    Move slowly.  Sudden movement can stretch the ligaments and cause pain. Stand, sit, and change positions slowly. Try to tighten the muscles in your hips before you sneeze or laugh. You can also sit down and bring your knees up toward your abdomen. This can help relieve tension on the ligaments.    Exercise as directed.  Gentle exercise can keep the ligaments loose and strengthen core (abdominal) muscles. An example is swimming, or a yoga program designed for pregnancy. Ask your healthcare provider which exercises are safe for you and how often to exercise. For most healthy women, a good goal is to try to get at least 30 minutes of exercise every day. If activity causes pain, try not to walk too long or too far at one time. Break your exercise up into short amounts.    Apply a warm compress to the area.  Warmth can relieve pain and muscle spasms. Ask your healthcare provider if you can take a warm bath or use a heating pad. Keep all heat settings low. High heat can be dangerous for your baby. Do not sit in a hot tub or use hot water in your bath. You may also be able to  massage the area gently while you are applying heat. Massage can help relieve pain.    Ask about pain medicines.  Ask your healthcare provider before you take any medicine during pregnancy, including over-the-counter pain medicines. Your healthcare provider may recommend acetaminophen to relieve the pain. Ask how much to take and how often to take it. Follow directions. Acetaminophen can cause liver damage. Too much medicine can be harmful to your baby.    When should I contact my healthcare provider?   You have pain that is spreading to other parts of your body.    You have new or worsening pain.    You have pain that lasts longer than a few minutes at a time.    You have questions or concerns about your condition or care.    CARE AGREEMENT:   You have the right to help plan your care. Learn about your health condition and how it may be treated. Discuss treatment options with your healthcare providers to decide what care you want to receive. You always have the right to refuse treatment. The above information is an  only. It is not intended as medical advice for individual conditions or treatments. Talk to your doctor, nurse or pharmacist before following any medical regimen to see if it is safe and effective for you.  © Copyright [employer] 2023 Information is for End User's use only and may not be sold, redistributed or otherwise used for commercial purposes.

## 2024-03-27 NOTE — PROGRESS NOTES
12 weeks 3 days   Pap 2021. Negative GC/CT:  PN1 Labs: 3/25/2024  Blood Type: B   Positive :   MFM Level 1:3/29/2024  MFM Level 2:  AFP:   28 Week Labs:  TDap:  Flu:  GBS:   Blue Folder: Given   Yellow Folder:  Ped Referral :  Breast pump:  L&D forms:  Delivery consent:

## 2024-03-27 NOTE — ASSESSMENT & PLAN NOTE
PN1Papo Covarrubias is a 28 y.o. year old  at 12w3d who presents for initial prenatal visit. With her  Vitor, mostly  Planned pregnancy  Denies complaints. Denies pelvic pain, bleeding, LOF.     Prenatal labs WNL. Rh pos.  Pap& GC/CT sent today.    Patient Education: Patient was counseled regarding diet, exercise, weight gain, foods to avoid, vaccines in pregnancy, aneuploidy screening, travel precautions to include seat belt use and VTE risk reduction.  She has been provided our pregnancy packet which includes pregnancy warning signs,how and when to contact providers, visit intervals, Maternal fetal medicine information, medication recommendations that are safe during pregnancy, dietary suggestions, activity recommendations, breastfeeding information as well as websites for additional information, and delivery location.    Past Medical History:   Diagnosis Date    39 weeks gestation of pregnancy 2022    First OB labs - normal Gc/chlamydia - 3/23/22 neg Pap - 21 neg Blue folder - has Genetic screening - Qnatal neg, MSAFP normal  It's a boy - Sonny    COVID vaccine - yes x 3  Level II normal  28 week labs - CBC, RPR normal. 1hr gtt abnormal, 3hr gtt normal  Received TDAP  Has breast pump Delivery consent previously signed GBS negative   Maintained on aspirin 162mg po daily thru 36 weeks 32 we    Fetal macrosomia in pregnancy in third trimester 2022    [facility] recommends considering a 39 IOL IOL request sent    Varicella     HAD VACCINES     Past Surgical History:   Procedure Laterality Date    CYST REMOVAL  2016    WISDOM TOOTH EXTRACTION       Immunization History   Administered Date(s) Administered    COVID-19 MODERNA VACC 0.5 ML IM 2021, 2021    COVID-19 PFIZER VACCINE 0.3 ML IM 2021    COVID-19 Pfizer Vac BIVALENT Benson-sucrose 12 Yr+ IM 10/23/2022    COVID-19 Pfizer mRNA vacc PF benson-sucrose 12 yr and older (Comirnaty) 2023    INFLUENZA 2021    Tdap 2022  "        Family History   Problem Relation Age of Onset    No Known Problems Mother     Hyperlipidemia Father     No Known Problems Sister     No Known Problems Brother     No Known Problems Son     No Known Problems Maternal Grandmother     No Known Problems Maternal Grandfather     Heart disease Paternal Grandmother     Heart failure Paternal Grandfather     Hyperlipidemia Paternal Grandfather      Social History     Tobacco Use    Smoking status: Never    Smokeless tobacco: Never   Vaping Use    Vaping status: Never Used   Substance Use Topics    Alcohol use: Not Currently     Alcohol/week: 1.0 standard drink of alcohol     Types: 1 Glasses of wine per week     Comment: socially none with pregnancy    Drug use: No     Comment: denies self, denies FOB, denies family use       Current Outpatient Medications:     Prenatal Vit-Fe Fumarate-FA (prenatal multivitamin) 28-0.8 MG TABS, Take 1 tablet by mouth daily, Disp: , Rfl:   Patient Active Problem List    Diagnosis Date Noted    12 weeks gestation of pregnancy 2024    Pregnancy related nausea, antepartum 2024    Obesity affecting pregnancy 2022       No Known Allergies    OB History    Para Term  AB Living   2 1 1 0 0 1   SAB IAB Ectopic Multiple Live Births   0 0 0 0 1      # Outcome Date GA Lbr Shane/2nd Weight Sex Delivery Anes PTL Lv   2 Current            1 Term 22 39w1d / 01:15 3815 g (8 lb 6.6 oz) M Vag-Spont EPI N KALA       Vitals:    24 1201   BP: 120/70   BP Location: Left arm   Patient Position: Sitting   Cuff Size: Large   Weight: 91.2 kg (201 lb)   Height: 5' 5\" (1.651 m)     Body mass index is 33.45 kg/m².      "

## 2024-03-28 ENCOUNTER — INITIAL PRENATAL (OUTPATIENT)
Dept: OBGYN CLINIC | Facility: CLINIC | Age: 29
End: 2024-03-28
Payer: COMMERCIAL

## 2024-03-28 VITALS
DIASTOLIC BLOOD PRESSURE: 70 MMHG | BODY MASS INDEX: 33.49 KG/M2 | SYSTOLIC BLOOD PRESSURE: 120 MMHG | HEIGHT: 65 IN | WEIGHT: 201 LBS

## 2024-03-28 DIAGNOSIS — Z12.31 ENCOUNTER FOR SCREENING MAMMOGRAM FOR MALIGNANT NEOPLASM OF BREAST: ICD-10-CM

## 2024-03-28 DIAGNOSIS — Z11.3 SCREENING FOR STDS (SEXUALLY TRANSMITTED DISEASES): ICD-10-CM

## 2024-03-28 DIAGNOSIS — Z34.82 PRENATAL CARE, SUBSEQUENT PREGNANCY, SECOND TRIMESTER: Primary | ICD-10-CM

## 2024-03-28 LAB
SL AMB  POCT GLUCOSE, UA: NORMAL
SL AMB POCT URINE PROTEIN: NORMAL

## 2024-03-28 PROCEDURE — PNV: Performed by: OBSTETRICS & GYNECOLOGY

## 2024-03-28 PROCEDURE — 87491 CHLMYD TRACH DNA AMP PROBE: CPT | Performed by: OBSTETRICS & GYNECOLOGY

## 2024-03-28 PROCEDURE — G0145 SCR C/V CYTO,THINLAYER,RESCR: HCPCS | Performed by: OBSTETRICS & GYNECOLOGY

## 2024-03-28 PROCEDURE — G0476 HPV COMBO ASSAY CA SCREEN: HCPCS | Performed by: OBSTETRICS & GYNECOLOGY

## 2024-03-28 PROCEDURE — 81002 URINALYSIS NONAUTO W/O SCOPE: CPT | Performed by: OBSTETRICS & GYNECOLOGY

## 2024-03-28 PROCEDURE — 87591 N.GONORRHOEAE DNA AMP PROB: CPT | Performed by: OBSTETRICS & GYNECOLOGY

## 2024-03-28 NOTE — PROGRESS NOTES
Problem   12 Weeks Gestation of Pregnancy    Blood Type: B. +       Pap 2021. GC/CT   PN1 Labs: NML    H&H: 13.1/39.5  28 Week Labs:  AFP:  Level 1:3/29/24  Level 2:  Tdap:  Flu:   GBS swab:     Blue folder:  Yellow folder:     Breast pump order:  L&D forms:    Delivery consent:   IOL:           12 weeks gestation of pregnancy  PN1Papo Covarrubias is a 28 y.o. year old  at 12w3d who presents for initial prenatal visit. With her  Vitor, mostly  Planned pregnancy  Denies complaints. Denies pelvic pain, bleeding, LOF.     Prenatal labs WNL. Rh pos.  Pap& GC/CT sent today.    Patient Education: Patient was counseled regarding diet, exercise, weight gain, foods to avoid, vaccines in pregnancy, aneuploidy screening, travel precautions to include seat belt use and VTE risk reduction.  She has been provided our pregnancy packet which includes pregnancy warning signs,how and when to contact providers, visit intervals, Maternal fetal medicine information, medication recommendations that are safe during pregnancy, dietary suggestions, activity recommendations, breastfeeding information as well as websites for additional information, and delivery location.    Past Medical History:   Diagnosis Date    39 weeks gestation of pregnancy 2022    First OB labs - normal Gc/chlamydia - 3/23/22 neg Pap - 21 neg Blue folder - has Genetic screening - Qnatal neg, MSAFP normal  It's a boy - Sonny    COVID vaccine - yes x 3  Level II normal  28 week labs - CBC, RPR normal. 1hr gtt abnormal, 3hr gtt normal  Received TDAP  Has breast pump Delivery consent previously signed GBS negative   Maintained on aspirin 162mg po daily thru 36 weeks 32 we    Fetal macrosomia in pregnancy in third trimester 2022    [facility] recommends considering a 39 IOL IOL request sent    Varicella     HAD VACCINES     Past Surgical History:   Procedure Laterality Date    CYST REMOVAL  2016    WISDOM TOOTH EXTRACTION       Immunization History  "  Administered Date(s) Administered    COVID-19 MODERNA VACC 0.5 ML IM 2021, 2021    COVID-19 PFIZER VACCINE 0.3 ML IM 2021    COVID-19 Pfizer Vac BIVALENT Benson-sucrose 12 Yr+ IM 10/23/2022    COVID-19 Pfizer mRNA vacc PF benson-sucrose 12 yr and older (Comirnaty) 2023    INFLUENZA 2021    Tdap 2022         Family History   Problem Relation Age of Onset    No Known Problems Mother     Hyperlipidemia Father     No Known Problems Sister     No Known Problems Brother     No Known Problems Son     No Known Problems Maternal Grandmother     No Known Problems Maternal Grandfather     Heart disease Paternal Grandmother     Heart failure Paternal Grandfather     Hyperlipidemia Paternal Grandfather      Social History     Tobacco Use    Smoking status: Never    Smokeless tobacco: Never   Vaping Use    Vaping status: Never Used   Substance Use Topics    Alcohol use: Not Currently     Alcohol/week: 1.0 standard drink of alcohol     Types: 1 Glasses of wine per week     Comment: socially none with pregnancy    Drug use: No     Comment: denies self, denies FOB, denies family use       Current Outpatient Medications:     Prenatal Vit-Fe Fumarate-FA (prenatal multivitamin) 28-0.8 MG TABS, Take 1 tablet by mouth daily, Disp: , Rfl:   Patient Active Problem List    Diagnosis Date Noted    12 weeks gestation of pregnancy 2024    Pregnancy related nausea, antepartum 2024    Obesity affecting pregnancy 2022       No Known Allergies    OB History    Para Term  AB Living   2 1 1 0 0 1   SAB IAB Ectopic Multiple Live Births   0 0 0 0 1      # Outcome Date GA Lbr Shane/2nd Weight Sex Delivery Anes PTL Lv   2 Current            1 Term 22 39w1d / 01:15 3815 g (8 lb 6.6 oz) M Vag-Spont EPI N KALA       Vitals:    24 1201   BP: 120/70   BP Location: Left arm   Patient Position: Sitting   Cuff Size: Large   Weight: 91.2 kg (201 lb)   Height: 5' 5\" (1.651 m)     Body mass " index is 33.45 kg/m².

## 2024-03-29 ENCOUNTER — ROUTINE PRENATAL (OUTPATIENT)
Dept: PERINATAL CARE | Facility: CLINIC | Age: 29
End: 2024-03-29
Payer: COMMERCIAL

## 2024-03-29 VITALS
HEART RATE: 112 BPM | DIASTOLIC BLOOD PRESSURE: 70 MMHG | SYSTOLIC BLOOD PRESSURE: 121 MMHG | HEIGHT: 65 IN | BODY MASS INDEX: 34.19 KG/M2 | WEIGHT: 205.2 LBS

## 2024-03-29 DIAGNOSIS — Z36.0 ENCOUNTER FOR ANTENATAL SCREENING FOR CHROMOSOMAL ANOMALIES: ICD-10-CM

## 2024-03-29 DIAGNOSIS — O09.899 SHORT INTERVAL BETWEEN PREGNANCIES AFFECTING PREGNANCY, ANTEPARTUM: ICD-10-CM

## 2024-03-29 DIAGNOSIS — Z36.9 UNSPECIFIED ANTENATAL SCREENING: ICD-10-CM

## 2024-03-29 DIAGNOSIS — Z36.82 ENCOUNTER FOR (NT) NUCHAL TRANSLUCENCY SCAN: ICD-10-CM

## 2024-03-29 DIAGNOSIS — Z3A.12 12 WEEKS GESTATION OF PREGNANCY: ICD-10-CM

## 2024-03-29 DIAGNOSIS — Z33.1 PREGNANT STATE, INCIDENTAL: ICD-10-CM

## 2024-03-29 DIAGNOSIS — N91.1 SECONDARY AMENORRHEA: ICD-10-CM

## 2024-03-29 DIAGNOSIS — O99.211 OBESITY AFFECTING PREGNANCY IN FIRST TRIMESTER, UNSPECIFIED OBESITY TYPE: Primary | ICD-10-CM

## 2024-03-29 LAB
CITATION REF LAB TEST: NORMAL
CLINICAL INFO: NORMAL
ETHNIC BACKGROUND STATED: NORMAL
GENE DIS ANL CARRIER INTERP-IMP: NORMAL
GENE MUT TESTED BLD/T: NORMAL
HPV HR 12 DNA CVX QL NAA+PROBE: NEGATIVE
HPV16 DNA CVX QL NAA+PROBE: NEGATIVE
HPV18 DNA CVX QL NAA+PROBE: NEGATIVE
LAB DIRECTOR NAME PROVIDER: NORMAL
REASON FOR REFERRAL (NARRATIVE): NORMAL
RECOMMENDATION PATIENT DOC-IMP: NORMAL
REF LAB TEST METHOD: NORMAL
SERVICE CMNT-IMP: NORMAL
SMN1 GENE MUT ANL BLD/T: NORMAL
SPECIMEN SOURCE: NORMAL

## 2024-03-29 PROCEDURE — 36415 COLL VENOUS BLD VENIPUNCTURE: CPT | Performed by: OBSTETRICS & GYNECOLOGY

## 2024-03-29 PROCEDURE — 76801 OB US < 14 WKS SINGLE FETUS: CPT | Performed by: OBSTETRICS & GYNECOLOGY

## 2024-03-29 PROCEDURE — 99243 OFF/OP CNSLTJ NEW/EST LOW 30: CPT | Performed by: OBSTETRICS & GYNECOLOGY

## 2024-03-29 PROCEDURE — 76813 OB US NUCHAL MEAS 1 GEST: CPT | Performed by: OBSTETRICS & GYNECOLOGY

## 2024-03-29 NOTE — PROGRESS NOTES
"[facility]: Marci Huber was seen today for nuchal translucency ultrasound.  See ultrasound report under \"OB Procedures\" tab.      MDM:   I. Diagnoses/Problems addressed:  Aneuploidy screen, BMI, short int preg  II.  Data: I ordered the following tests: NIPS.  III.  Risk of morbidity: Moderate (asa)    Please don't hesitate to contact our office with any concerns or questions.  -Raquel Way MD      "

## 2024-03-29 NOTE — PROGRESS NOTES
Ultrasound Probe Disinfection    A transvaginal ultrasound was performed.   Prior to use, disinfection was performed with High Level Disinfection Process ([employer]).  Probe serial number [id number]: [id number] ANGELA was used.      Marisa Newman  03/29/24  2:08 PM

## 2024-03-29 NOTE — LETTER
"2024    Colleen Méndez DO  [address] [place] [address]  [place] [address]    Patient: Marci Huber   YOB: 1995   Date of Visit: 3/29/2024   Gestational age 12w4d   Nature of this communication: Routine; NT scan       Dear Dr Méndez,    This patient was seen recently in our  office.  The content of my evaluation today is in the ultrasound report under \"OB Procedures\" tab.     Please don't hesitate to contact our office with any concerns or questions.     Sincerely,      Raquel Way MD  Attending Physician, Maternal-Fetal Medicine  [facility]      "

## 2024-03-29 NOTE — PROGRESS NOTES
Patient chose to have [facility] [id number] Non-Invasive Prenatal Screen [id number] PLUS w/ SCA, WITH fetal sex.  Patient choose to be billed through insurance.     Patient given brochure and is aware [facility] will contact patient's insurance and coordinate coverage.  Provided [facility] contact information. General inquiries [phone], Cost estimates [phone] and [facility] Billing [phone]. Website [employer].     Blood collection tubes labeled with patient identifiers (name, medical record number, and date of birth).     Filled out [facility] order form. Patient chose to have blood drawn in our office at time of visit. NIPS was drawn from right arm with a straight needle by BEV [facility] . .          Maternal Fetal Medicine will have results in approximately 5-7 business days and will call patient or notify via [employer].  Patient aware viewing lab result online will reveal fetal sex if ordered.    Patient verbalized understanding of all instructions and no questions at this time.

## 2024-03-31 LAB
C TRACH DNA SPEC QL NAA+PROBE: NEGATIVE
N GONORRHOEA DNA SPEC QL NAA+PROBE: NEGATIVE

## 2024-04-04 LAB
CFDNA.FET/CFDNA.TOTAL SFR FETUS: NORMAL %
CITATION REF LAB TEST: NORMAL
FET 13+18+21+X+Y ANEUP PLAS.CFDNA: NEGATIVE
FET CHR 21 TS PLAS.CFDNA QL: NEGATIVE
FET CHR 21 TS PLAS.CFDNA QL: NEGATIVE
FET MS X RISK WBC.DNA+CFDNA QL: NOT DETECTED
FET SEX PLAS.CFDNA DOSAGE CFDNA: NORMAL
FET TS 13 RISK PLAS.CFDNA QL: NEGATIVE
FET X + Y ANEUP RISK PLAS.CFDNA SEQ-IMP: NOT DETECTED
GA EST FROM CONCEPTION DATE: NORMAL D
GESTATIONAL AGE > 9:: YES
LAB DIRECTOR NAME PROVIDER: NORMAL
LAB DIRECTOR NAME PROVIDER: NORMAL
LABORATORY COMMENT REPORT: NORMAL
LIMITATIONS OF THE TEST: NORMAL
NEGATIVE PREDICTIVE VALUE: NORMAL
PERFORMANCE CHARACTERISTICS: NORMAL
POSITIVE PREDICTIVE VALUE: NORMAL
REF LAB TEST METHOD: NORMAL
SL AMB NOTE:: NORMAL
TEST PERFORMANCE INFO SPEC: NORMAL

## 2024-04-10 LAB
LAB AP GYN PRIMARY INTERPRETATION: NORMAL
LAB AP LMP: NORMAL
Lab: NORMAL

## 2024-04-27 LAB
CFTR FULL MUT ANL BLD/T SEQ: NORMAL
CITATION REF LAB TEST: NORMAL
CLINICAL INFO: NORMAL
ETHNIC BACKGROUND STATED: NORMAL
GENE DIS ANL CARRIER INTERP-IMP: NORMAL
INDICATION: NORMAL
LAB DIRECTOR NAME PROVIDER: NORMAL
RECOMMENDATION PATIENT DOC-IMP: NORMAL
REF LAB TEST METHOD: NORMAL
SERVICE CMNT-IMP: NORMAL
SPECIMEN SOURCE: NORMAL

## 2024-04-30 ENCOUNTER — ROUTINE PRENATAL (OUTPATIENT)
Dept: OBGYN CLINIC | Facility: CLINIC | Age: 29
End: 2024-04-30
Payer: COMMERCIAL

## 2024-04-30 VITALS
OXYGEN SATURATION: 98 % | BODY MASS INDEX: 33.78 KG/M2 | HEART RATE: 96 BPM | DIASTOLIC BLOOD PRESSURE: 72 MMHG | SYSTOLIC BLOOD PRESSURE: 110 MMHG | WEIGHT: 203 LBS

## 2024-04-30 DIAGNOSIS — Z3A.17 17 WEEKS GESTATION OF PREGNANCY: Primary | ICD-10-CM

## 2024-04-30 DIAGNOSIS — Z34.82 PRENATAL CARE, SUBSEQUENT PREGNANCY, SECOND TRIMESTER: ICD-10-CM

## 2024-04-30 DIAGNOSIS — O99.211 OBESITY AFFECTING PREGNANCY IN FIRST TRIMESTER, UNSPECIFIED OBESITY TYPE: ICD-10-CM

## 2024-04-30 PROBLEM — Z3A.16 16 WEEKS GESTATION OF PREGNANCY: Status: ACTIVE | Noted: 2024-02-20

## 2024-04-30 LAB
SL AMB  POCT GLUCOSE, UA: ABNORMAL
SL AMB POCT COLOR,UA: ABNORMAL
SL AMB POCT URINE PROTEIN: ABNORMAL

## 2024-04-30 PROCEDURE — PNV: Performed by: OBSTETRICS & GYNECOLOGY

## 2024-04-30 PROCEDURE — 81002 URINALYSIS NONAUTO W/O SCOPE: CPT | Performed by: OBSTETRICS & GYNECOLOGY

## 2024-04-30 NOTE — PROGRESS NOTES
Problem List Items Addressed This Visit       Obesity affecting pregnancy     TWG 3 lb  Normal early glucola         17 weeks gestation of pregnancy - Primary     Patient doing well today, up to date on care.  Some flutters she thinks. Denies ctx, vb and lof.   AFP ordered and pending  Anatomy scan 24  Up to date on care.  Precautions reviewed, f/u in 4 weeks          Other Visit Diagnoses       Prenatal care, subsequent pregnancy, second trimester        Relevant Orders    Alpha fetoprotein, maternal    POCT urine dip (Completed)            Marci Huber is a 28 y.o.  at 17w1d who presents today for routine prenatal visit.     /72 (BP Location: Left arm, Patient Position: Sitting, Cuff Size: Standard)   Pulse 96   Wt 92.1 kg (203 lb)   LMP 2023 (Exact Date)   SpO2 98%   BMI 33.78 kg/m²

## 2024-04-30 NOTE — ASSESSMENT & PLAN NOTE
Patient doing well today, up to date on care.  Some flutters she thinks. Denies ctx, vb and lof.   AFP ordered and pending  Anatomy scan 5/20/24  Up to date on care.  Precautions reviewed, f/u in 4 weeks

## 2024-04-30 NOTE — PROGRESS NOTES
17w1d. B positive.  Nucal done 3/24/24. AFP ordered.   PN 1 labs completed.   Level 2 scheduled.  LOF-no  VB-no  CTX-no FM-not yet

## 2024-05-07 ENCOUNTER — APPOINTMENT (OUTPATIENT)
Dept: LAB | Facility: CLINIC | Age: 29
End: 2024-05-07
Payer: COMMERCIAL

## 2024-05-07 DIAGNOSIS — Z34.82 PRENATAL CARE, SUBSEQUENT PREGNANCY, SECOND TRIMESTER: ICD-10-CM

## 2024-05-07 DIAGNOSIS — Z3A.12 12 WEEKS GESTATION OF PREGNANCY: ICD-10-CM

## 2024-05-07 DIAGNOSIS — Z36.82 ENCOUNTER FOR (NT) NUCHAL TRANSLUCENCY SCAN: ICD-10-CM

## 2024-05-07 DIAGNOSIS — O99.211 OBESITY AFFECTING PREGNANCY IN FIRST TRIMESTER, UNSPECIFIED OBESITY TYPE: ICD-10-CM

## 2024-05-07 PROCEDURE — 36415 COLL VENOUS BLD VENIPUNCTURE: CPT

## 2024-05-07 PROCEDURE — 82105 ALPHA-FETOPROTEIN SERUM: CPT

## 2024-05-09 LAB
2ND TRIMESTER 4 SCREEN SERPL-IMP: NORMAL
AFP ADJ MOM SERPL: 0.9
AFP INTERP AMN-IMP: NORMAL
AFP INTERP SERPL-IMP: NORMAL
AFP INTERP SERPL-IMP: NORMAL
AFP SERPL-MCNC: 33.4 NG/ML
AGE AT DELIVERY: 31.4 YR
GA METHOD: NORMAL
GA: 18.1 WEEKS
IDDM PATIENT QL: NO
MULTIPLE PREGNANCY: NO
NEURAL TUBE DEFECT RISK FETUS: NORMAL %

## 2024-05-20 ENCOUNTER — ROUTINE PRENATAL (OUTPATIENT)
Dept: PERINATAL CARE | Facility: CLINIC | Age: 29
End: 2024-05-20
Payer: COMMERCIAL

## 2024-05-20 VITALS
SYSTOLIC BLOOD PRESSURE: 123 MMHG | DIASTOLIC BLOOD PRESSURE: 80 MMHG | BODY MASS INDEX: 34.42 KG/M2 | HEIGHT: 65 IN | WEIGHT: 206.6 LBS | HEART RATE: 104 BPM

## 2024-05-20 DIAGNOSIS — O99.210 OBESITY AFFECTING PREGNANCY, ANTEPARTUM, UNSPECIFIED OBESITY TYPE: ICD-10-CM

## 2024-05-20 DIAGNOSIS — Z36.86 ENCOUNTER FOR ANTENATAL SCREENING FOR CERVICAL LENGTH: ICD-10-CM

## 2024-05-20 DIAGNOSIS — Z36.3 ENCOUNTER FOR ANTENATAL SCREENING FOR MALFORMATIONS: Primary | ICD-10-CM

## 2024-05-20 PROCEDURE — 76811 OB US DETAILED SNGL FETUS: CPT | Performed by: OBSTETRICS & GYNECOLOGY

## 2024-05-20 PROCEDURE — 76817 TRANSVAGINAL US OBSTETRIC: CPT | Performed by: OBSTETRICS & GYNECOLOGY

## 2024-05-20 NOTE — PROGRESS NOTES
This patient received  care under my supervision on 24 at 20w0d gestational age at [facility].  The note is contained in the ultrasound report located under OB Procedures tab in Epic.  Please call our office at [phone] with questions.  -Diamond Montague MD

## 2024-05-20 NOTE — PROGRESS NOTES
Ultrasound Probe Disinfection    A transvaginal ultrasound was performed.   Prior to use, disinfection was performed with High Level Disinfection Process ([employer]).  Probe serial number B2: [id number] was used.      Perla Oconnell  05/20/24  2:32 PM

## 2024-05-28 ENCOUNTER — ROUTINE PRENATAL (OUTPATIENT)
Dept: OBGYN CLINIC | Facility: CLINIC | Age: 29
End: 2024-05-28
Payer: COMMERCIAL

## 2024-05-28 VITALS
SYSTOLIC BLOOD PRESSURE: 120 MMHG | HEART RATE: 123 BPM | OXYGEN SATURATION: 97 % | DIASTOLIC BLOOD PRESSURE: 72 MMHG | BODY MASS INDEX: 34.45 KG/M2 | WEIGHT: 207 LBS

## 2024-05-28 DIAGNOSIS — Z34.82 PRENATAL CARE, SUBSEQUENT PREGNANCY, SECOND TRIMESTER: Primary | ICD-10-CM

## 2024-05-28 DIAGNOSIS — O99.211 OBESITY AFFECTING PREGNANCY IN FIRST TRIMESTER, UNSPECIFIED OBESITY TYPE: ICD-10-CM

## 2024-05-28 DIAGNOSIS — Z3A.21 21 WEEKS GESTATION OF PREGNANCY: ICD-10-CM

## 2024-05-28 DIAGNOSIS — O09.899 SHORT INTERVAL BETWEEN PREGNANCIES AFFECTING PREGNANCY, ANTEPARTUM: ICD-10-CM

## 2024-05-28 LAB
SL AMB  POCT GLUCOSE, UA: NEGATIVE
SL AMB POCT URINE PROTEIN: NEGATIVE

## 2024-05-28 PROCEDURE — 81002 URINALYSIS NONAUTO W/O SCOPE: CPT | Performed by: OBSTETRICS & GYNECOLOGY

## 2024-05-28 PROCEDURE — PNV: Performed by: OBSTETRICS & GYNECOLOGY

## 2024-05-28 NOTE — PROGRESS NOTES
Prenatal visit @ 21w1d. Denies ctxs, VB, LOF.  +FM.  Discussed summer heat - advised aggressive hydration, watching air quality warnings.    Problem List Items Addressed This Visit       Obesity affecting pregnancy     TWG 7#.         21 weeks gestation of pregnancy     Up to date on labs.  Level 2 2024 - no abnormalities but many missed views; follow up on 24 to complete level 2.   precautions given.          Short interval between pregnancies affecting pregnancy, antepartum     Strict  precautions.           Other Visit Diagnoses       Prenatal care, subsequent pregnancy, second trimester    -  Primary    Relevant Orders    POCT urine dip (Completed)

## 2024-05-28 NOTE — ASSESSMENT & PLAN NOTE
Up to date on labs.  Level 2 2024 - no abnormalities but many missed views; follow up on 24 to complete level 2.   precautions given.

## 2024-06-11 DIAGNOSIS — Z36.82 ENCOUNTER FOR (NT) NUCHAL TRANSLUCENCY SCAN: ICD-10-CM

## 2024-06-11 DIAGNOSIS — Z3A.12 12 WEEKS GESTATION OF PREGNANCY: ICD-10-CM

## 2024-06-11 DIAGNOSIS — O99.211 OBESITY AFFECTING PREGNANCY IN FIRST TRIMESTER, UNSPECIFIED OBESITY TYPE: ICD-10-CM

## 2024-06-17 ENCOUNTER — TELEPHONE (OUTPATIENT)
Age: 29
End: 2024-06-17

## 2024-06-17 ENCOUNTER — ROUTINE PRENATAL (OUTPATIENT)
Dept: PERINATAL CARE | Facility: CLINIC | Age: 29
End: 2024-06-17
Payer: COMMERCIAL

## 2024-06-17 VITALS
WEIGHT: 206.6 LBS | DIASTOLIC BLOOD PRESSURE: 70 MMHG | HEART RATE: 95 BPM | HEIGHT: 65 IN | BODY MASS INDEX: 34.42 KG/M2 | SYSTOLIC BLOOD PRESSURE: 118 MMHG

## 2024-06-17 DIAGNOSIS — O09.899 SHORT INTERVAL BETWEEN PREGNANCIES AFFECTING PREGNANCY, ANTEPARTUM: ICD-10-CM

## 2024-06-17 DIAGNOSIS — Z3A.24 24 WEEKS GESTATION OF PREGNANCY: ICD-10-CM

## 2024-06-17 DIAGNOSIS — E66.09 OTHER OBESITY DUE TO EXCESS CALORIES AFFECTING PREGNANCY IN SECOND TRIMESTER: ICD-10-CM

## 2024-06-17 DIAGNOSIS — O99.212 OTHER OBESITY DUE TO EXCESS CALORIES AFFECTING PREGNANCY IN SECOND TRIMESTER: ICD-10-CM

## 2024-06-17 DIAGNOSIS — O36.62X0: Primary | ICD-10-CM

## 2024-06-17 PROCEDURE — 76816 OB US FOLLOW-UP PER FETUS: CPT | Performed by: OBSTETRICS & GYNECOLOGY

## 2024-06-17 NOTE — PROGRESS NOTES
Marci Huber  is 24w0d  Her recently completed fetal testing revealed a normal NIPT and MSAFP. She is here today for an ultrasound for fetal growth and to review the prior missed anatomy not seen well on her 20-week ultrasound.  I called Marci at home after office hours as I was not immediately available to speak with her at the end of her visit. I left a message for her to review her my chart camilo for her report as her voice mail does not have any identifier to know that I was leaving a message on her phone.    Problem list:  Pregravid BMI of 33.3  Short interval pregnancy  Prior baby weighed 8 pounds 6 ounces at 39 weeks.  At 36 weeks in that pregnancy her ultrasound was showing that her baby was measuring in the > 97%.    Ultrasound findings:  The ultrasound today shows a symmetric fetus that is measuring in the 98% and is measuring 11 days ahead of dates. The prior missed anatomy was reviewed and no malformations were detected however views of the interventricular septum and interatrial septum are still limited secondary to fetal position.    Pregnancy ultrasound has limitations and is unable to detect all forms of fetal congenital abnormalities.      Follow up recommended:   She was previously scheduled for an a follow-up ultrasound in 4 weeks for growth.  Will review the missed anatomy at that visit.       Amy Lees MD

## 2024-06-17 NOTE — LETTER
June 17, 2024     Marilia Majano DO  [address]  [address]  [place] [address]    Patient: Marci Huber   YOB: 1995   Date of Visit: 6/17/2024       Dear Dr. Majano:    Thank you for referring Marci Huber to me for evaluation. Below are my notes for this consultation.    If you have questions, please do not hesitate to call me. I look forward to following your patient along with you.         Sincerely,        Amy Lees MD        CC: No Recipients    Amy Lees MD  6/17/2024  6:54 PM  Sign when Signing Visit  Marci Huber  is 24w0d  Her recently completed fetal testing revealed a normal NIPT and MSAFP. She is here today for an ultrasound for fetal growth and to review the prior missed anatomy not seen well on her 20-week ultrasound.  I called Marci at home after office hours as I was not immediately available to speak with her at the end of her visit. I left a message for her to review her my chart camilo for her report as her voice mail does not have any identifier to know that I was leaving a message on her phone.    Problem list:  Pregravid BMI of 33.3  Short interval pregnancy  Prior baby weighed 8 pounds 6 ounces at 39 weeks.  At 36 weeks in that pregnancy her ultrasound was showing that her baby was measuring in the > 97%.    Ultrasound findings:  The ultrasound today shows a symmetric fetus that is measuring in the 98% and is measuring 11 days ahead of dates. The prior missed anatomy was reviewed and no malformations were detected however views of the interventricular septum and interatrial septum are still limited secondary to fetal position.    Pregnancy ultrasound has limitations and is unable to detect all forms of fetal congenital abnormalities.      Follow up recommended:   She was previously scheduled for an a follow-up ultrasound in 4 weeks for growth.  Will review the missed anatomy at that visit.       Amy Lees MD

## 2024-06-17 NOTE — TELEPHONE ENCOUNTER
Patient called and was seen today for her US and she said the provider was backed up and was told she would get a call back about her results. She asked if she could please get a call back at [phone] . Thank you

## 2024-06-17 NOTE — LETTER
June 17, 2024     Marilia Majano DO  [address]  [address]  [place] [address]    Patient: Marci Huber   YOB: 1995   Date of Visit: 6/17/2024       Dear Dr. Majano:    Thank you for referring Marci Huber to me for evaluation. Below are my notes for this consultation.    If you have questions, please do not hesitate to call me. I look forward to following your patient along with you.         Sincerely,        Amy Lees MD        CC: No Recipients    Amy Lees MD  6/17/2024  6:46 PM  Sign when Signing Visit  Marci Huber  is 24w0d  Her recently completed fetal testing revealed a normal NIPT and MSAFP. She is here today for an ultrasound for fetal growth and to review the prior missed anatomy not seen well on her 20-week ultrasound.  I called Marci at home after office hours as I was not immediately available to speak with her at the end of her visit. I left a message for her to review her my chart camilo for her report as her voice mail does not have any identifier to know that I was leaving a message on her phone.    Problem list:  Pregravid BMI of 33.3  Short interval pregnancy  Prior baby weighed 8 pounds 6 ounces at 39 weeks.  At 36 weeks in that pregnancy her ultrasound was showing that her baby was measuring in the > 97%.    Ultrasound findings:  The ultrasound today shows a symmetric fetus that is measuring in the 98% and is measuring 11 days ahead of dates. The prior missed anatomy was reviewed and no malformations were detected however views of the interventricular septum and interatrial septum are still limited secondary to fetal position.    Pregnancy ultrasound has limitations and is unable to detect all forms of fetal congenital abnormalities.      Follow up recommended:   She was previously scheduled for an a follow-up ultrasound in 4 weeks for growth.  Will review the missed anatomy at that visit.       Amy Lees MD

## 2024-06-24 ENCOUNTER — ROUTINE PRENATAL (OUTPATIENT)
Dept: OBGYN CLINIC | Facility: CLINIC | Age: 29
End: 2024-06-24
Payer: COMMERCIAL

## 2024-06-24 VITALS
DIASTOLIC BLOOD PRESSURE: 76 MMHG | HEART RATE: 98 BPM | BODY MASS INDEX: 34.81 KG/M2 | SYSTOLIC BLOOD PRESSURE: 122 MMHG | WEIGHT: 209.2 LBS

## 2024-06-24 DIAGNOSIS — Z3A.24 24 WEEKS GESTATION OF PREGNANCY: Primary | ICD-10-CM

## 2024-06-24 DIAGNOSIS — Z34.82 PRENATAL CARE, SUBSEQUENT PREGNANCY, SECOND TRIMESTER: ICD-10-CM

## 2024-06-24 DIAGNOSIS — E66.09 OTHER OBESITY DUE TO EXCESS CALORIES AFFECTING PREGNANCY IN SECOND TRIMESTER: ICD-10-CM

## 2024-06-24 DIAGNOSIS — O99.212 OTHER OBESITY DUE TO EXCESS CALORIES AFFECTING PREGNANCY IN SECOND TRIMESTER: ICD-10-CM

## 2024-06-24 LAB
SL AMB  POCT GLUCOSE, UA: NEGATIVE
SL AMB POCT URINE PROTEIN: ABNORMAL

## 2024-06-24 PROCEDURE — PNV: Performed by: OBSTETRICS & GYNECOLOGY

## 2024-06-24 PROCEDURE — 81002 URINALYSIS NONAUTO W/O SCOPE: CPT | Performed by: OBSTETRICS & GYNECOLOGY

## 2024-06-24 NOTE — ASSESSMENT & PLAN NOTE
Pt doing well today, no complaints  +FM. Denies ctx, vb and lof.   Sp normal anatomy scan on 6/17/24- had missed views, has follow up 7/15/24  28w labs ordered and reviewed  Precautions reviewed. F/u in 4 weeks

## 2024-06-24 NOTE — PROGRESS NOTES
OB- 25w0d.   Denies cramping, bleeding, leakage of fluid. Normal fetal movement.  28 week labs given aware of window.   Genetic testing- low risk / neg  Growth 7/15/24

## 2024-06-24 NOTE — PROGRESS NOTES
Problem List Items Addressed This Visit       Obesity complicating pregnancy in second trimester     TWG 9 lbs            24 weeks gestation of pregnancy - Primary     Pt doing well today, no complaints  +FM. Denies ctx, vb and lof.   Sp normal anatomy scan on 24- had missed views, has follow up 7/15/24  28w labs ordered and reviewed  Precautions reviewed. F/u in 4 weeks          Other Visit Diagnoses       Prenatal care, subsequent pregnancy, second trimester        Relevant Orders    Anemia Panel w/Reflex, OB    CBC and differential    Glucose, 1H PG    RPR-Syphilis Screening (Total Syphilis IGG/IGM)    POCT urine dip (Completed)            Marci Huber is a 29 y.o.  at 25w0d who presents today for routine prenatal visit.     /76 (BP Location: Right arm, Patient Position: Sitting, Cuff Size: Large)   Pulse 98   Wt 94.9 kg (209 lb 3.2 oz)   LMP 2023 (Exact Date)   BMI 34.81 kg/m²

## 2024-07-03 ENCOUNTER — APPOINTMENT (OUTPATIENT)
Dept: LAB | Facility: CLINIC | Age: 29
End: 2024-07-03
Payer: COMMERCIAL

## 2024-07-03 DIAGNOSIS — Z34.82 PRENATAL CARE, SUBSEQUENT PREGNANCY, SECOND TRIMESTER: ICD-10-CM

## 2024-07-03 LAB
BASOPHILS # BLD AUTO: 0.04 THOUSANDS/ÂΜL (ref 0–0.1)
BASOPHILS NFR BLD AUTO: 0 % (ref 0–1)
EOSINOPHIL # BLD AUTO: 0.16 THOUSAND/ÂΜL (ref 0–0.61)
EOSINOPHIL NFR BLD AUTO: 1 % (ref 0–6)
ERYTHROCYTE [DISTWIDTH] IN BLOOD BY AUTOMATED COUNT: 13 % (ref 11.6–15.1)
GLUCOSE 1H P 50 G GLC PO SERPL-MCNC: 152 MG/DL (ref 70–134)
HCT VFR BLD AUTO: 34.1 % (ref 34.8–46.1)
HGB BLD-MCNC: 11.1 G/DL (ref 11.5–15.4)
IMM GRANULOCYTES # BLD AUTO: 0.09 THOUSAND/UL (ref 0–0.2)
IMM GRANULOCYTES NFR BLD AUTO: 1 % (ref 0–2)
LYMPHOCYTES # BLD AUTO: 1.64 THOUSANDS/ÂΜL (ref 0.6–4.47)
LYMPHOCYTES NFR BLD AUTO: 15 % (ref 14–44)
MCH RBC QN AUTO: 26 PG (ref 26.8–34.3)
MCHC RBC AUTO-ENTMCNC: 32.6 G/DL (ref 31.4–37.4)
MCV RBC AUTO: 80 FL (ref 82–98)
MONOCYTES # BLD AUTO: 0.51 THOUSAND/ÂΜL (ref 0.17–1.22)
MONOCYTES NFR BLD AUTO: 5 % (ref 4–12)
NEUTROPHILS # BLD AUTO: 8.88 THOUSANDS/ÂΜL (ref 1.85–7.62)
NEUTS SEG NFR BLD AUTO: 78 % (ref 43–75)
NRBC BLD AUTO-RTO: 0 /100 WBCS
PLATELET # BLD AUTO: 224 THOUSANDS/UL (ref 149–390)
PMV BLD AUTO: 9.1 FL (ref 8.9–12.7)
RBC # BLD AUTO: 4.27 MILLION/UL (ref 3.81–5.12)
TREPONEMA PALLIDUM IGG+IGM AB [PRESENCE] IN SERUM OR PLASMA BY IMMUNOASSAY: NORMAL
WBC # BLD AUTO: 11.32 THOUSAND/UL (ref 4.31–10.16)

## 2024-07-03 PROCEDURE — 82950 GLUCOSE TEST: CPT

## 2024-07-03 PROCEDURE — 85025 COMPLETE CBC W/AUTO DIFF WBC: CPT

## 2024-07-03 PROCEDURE — 86780 TREPONEMA PALLIDUM: CPT

## 2024-07-03 PROCEDURE — 36415 COLL VENOUS BLD VENIPUNCTURE: CPT

## 2024-07-05 DIAGNOSIS — R73.09 ABNORMAL GLUCOSE TOLERANCE TEST: Primary | ICD-10-CM

## 2024-07-08 ENCOUNTER — NURSE TRIAGE (OUTPATIENT)
Age: 29
End: 2024-07-08

## 2024-07-08 ENCOUNTER — HOSPITAL ENCOUNTER (OUTPATIENT)
Facility: HOSPITAL | Age: 29
Discharge: HOME | End: 2024-07-08
Attending: STUDENT IN AN ORGANIZED HEALTH CARE EDUCATION/TRAINING PROGRAM | Admitting: STUDENT IN AN ORGANIZED HEALTH CARE EDUCATION/TRAINING PROGRAM
Payer: COMMERCIAL

## 2024-07-08 VITALS
TEMPERATURE: 97.9 F | DIASTOLIC BLOOD PRESSURE: 67 MMHG | BODY MASS INDEX: 34.85 KG/M2 | OXYGEN SATURATION: 96 % | HEIGHT: 65 IN | RESPIRATION RATE: 16 BRPM | SYSTOLIC BLOOD PRESSURE: 121 MMHG | HEART RATE: 104 BPM | WEIGHT: 209.2 LBS

## 2024-07-08 PROBLEM — N89.8 VAGINAL DISCHARGE: Status: ACTIVE | Noted: 2024-07-08

## 2024-07-08 PROCEDURE — 99213 OFFICE O/P EST LOW 20 MIN: CPT

## 2024-07-08 PROCEDURE — NC001 PR NO CHARGE: Performed by: STUDENT IN AN ORGANIZED HEALTH CARE EDUCATION/TRAINING PROGRAM

## 2024-07-08 NOTE — TELEPHONE ENCOUNTER
"Patient is 27w0d calling to report increased vaginal discharge/LOF over the last 24 hrs. She had filled 4 panty liners with clear watery discharge in that time. Good fetal movement. Some mild and irregular intermittent cramping the last few nights and diarrhea since Thursday. She feels well hydrated. Denies any fever, vaginal bleeding, itching, or urinary concerns. ESC to on-call Dr. Lilly who advised [facility] triage to rule out PPROM. Patient agreeable to plan. ESC to [facility] L&D Charge to notify of incoming patient. ETA 30 Mins.    Reason for Disposition  • Leakage of fluid from vagina    Additional Information  • Leakage of fluid (trickle, gush) from the vagina    Answer Assessment - Initial Assessment Questions  1. DISCHARGE: \"Describe the discharge.\" (e.g., white, yellow, green, gray, foamy, cottage cheese-like)      Clear watery discharge  2. ODOR: \"Is there a bad odor?\"      Denies  3. ONSET: \"When did the discharge begin?\"      24 hrs  4. RASH: \"Is there a rash in that area?\" If Yes, ask: \"Describe it.\" (e.g., redness, blisters, sores, bumps)      HS  5. ABDOMINAL PAIN: \"Are you having any abdominal pain?\" If Yes, ask: \"What does it feel like?\" (e.g., crampy, dull, intermittent, constant)       Cramping at night for the last few nights  6. ABDOMINAL PAIN SEVERITY: If present, ask: \"How bad is it?\"  (e.g., Scale 1-10; mild, moderate, or severe)    - MILD (1-3): doesn't interfere with normal activities, abdomen soft and not tender to touch     - MODERATE (4-7): interferes with normal activities or awakens from sleep, tender to touch     - SEVERE (8-10): excruciating pain, doubled over, unable to do any normal activities      3/10  7. CAUSE: \"What do you think is causing the discharge?\"      unsure  8. OTHER SYMPTOMS: \"Do you have any other symptoms?\" (e.g., fever, itching, vaginal bleeding, pain with urination)      Denies fever, body aches, chill, itching, VB, urinary concerns  Notes " "diarrhea since Thursday with some lightheadedness     9. SVITLANA: \"What date are you expecting to deliver?\"       10/7/2024  10. PREGNANCY: \"How many weeks pregnant are you?\"        27w0d    Cramping at night, good fetal movement - 3-4 pantyliners    Protocols used: Pregnancy - Vaginal Discharge-ADULT-OH, Pregnancy - Rupture of Membranes-ADULT-OH    "

## 2024-07-08 NOTE — PROGRESS NOTES
L&D Triage Note - OB/GYN  Marci Huber 29 y.o. female MRN: [id number]  Unit/Bed#:  TRIAGE 2 Encounter: [id number]      ASSESSMENT:    Marci Huber is a 29 y.o.  at 27w0d presenting with complaint of leakage of fluid. Triage Pt: Marci 28 yo,  complaining of leakage of fluids for the past 2 days and minimal cramps. Speculum exam no pooling, no signs of bleeding, SVE closed, Nitrazine negative, fern test negative, slides unremarkable cervical length 3.83cm,     PLAN:    1) Leakage of fluid  - speculum exam: no signs of fluid or pooling, Nitrazine negativa, Fern test negativa  - Discussed with the patient about signs of fluid leakage    2) 27 weeks gestation of pregnancy  - Continue routine prenatal care  - Discharge from OB triage with  labor precautions    - Reviewed rupture of membranes, false vs true labor, decreased fetal movement, and vaginal bleeding   - Pt to call provider with any concerns and follow up at her next scheduled prenatal appointment    - Case discussed with Dr. Lilly    SUBJECTIVE:    Marci Huber 29 y.o.  at 27w0d with an Estimated Date of Delivery: 10/7/24 who presents to triage for leakage of fluid that started two days ago, the fluid has no color or odor .  She otherwise denies contractions, vaginal bleeding, and decreased fetal movement.    Her current obstetrical history is significant for Obesity, short interval between pregnancies, fetal macrosomia    Her past obstetrical history is significant for Macrosomia    OBJECTIVE:    Vitals:    24 1241   BP: 121/67   Pulse:    Resp:    Temp:    SpO2:        ROS:  Constitutional: Negative  Respiratory: Negative  Cardiovascular: Negative    Gastrointestinal: Negative    General Physical Exam:  General: Well appearing, no distress  Respiratory: Unlabored breathing  Cardiovascular: Regular rate.  Abdomen: Soft, gravid, nontender  Fundal Height: Appropriate for gestational  age.  Extremities: Warm and well perfused.  Non tender.      Cervical Exam  Speculum: Cervical os is normal in aspect  SVE: closed    FETAL ASSESSMENT:  Fetal heart rate: Baseline Rate (FHR): 140 bpm  Variability: Moderate  Decelerations: None  [place]: no contractions     KOH/WTMT:     Infection:   - no clue cells    - no hyphae   - no trichomonads present    Membrane status   - no ferning   - no nitrazene   - no pooling       Imaging:       TVUS   - Cervical length    - 3.83cm    - 4 cm    - 3.9 cm   -       Alberto Wise MD  7/8/2024  1:11 PM

## 2024-07-12 ENCOUNTER — APPOINTMENT (OUTPATIENT)
Dept: LAB | Facility: CLINIC | Age: 29
End: 2024-07-12
Payer: COMMERCIAL

## 2024-07-12 DIAGNOSIS — R73.09 ABNORMAL GLUCOSE TOLERANCE TEST: ICD-10-CM

## 2024-07-12 LAB
GLUCOSE 1H P 100 G GLC PO SERPL-MCNC: 172 MG/DL (ref 65–179)
GLUCOSE 2H P 100 G GLC PO SERPL-MCNC: 158 MG/DL (ref 65–154)
GLUCOSE 3H P 100 G GLC PO SERPL-MCNC: 140 MG/DL (ref 65–139)
GLUCOSE P FAST SERPL-MCNC: 85 MG/DL (ref 65–94)

## 2024-07-12 PROCEDURE — 82951 GLUCOSE TOLERANCE TEST (GTT): CPT

## 2024-07-12 PROCEDURE — 82952 GTT-ADDED SAMPLES: CPT

## 2024-07-12 PROCEDURE — 36415 COLL VENOUS BLD VENIPUNCTURE: CPT

## 2024-07-15 ENCOUNTER — ULTRASOUND (OUTPATIENT)
Dept: PERINATAL CARE | Facility: CLINIC | Age: 29
End: 2024-07-15
Payer: COMMERCIAL

## 2024-07-15 VITALS
HEART RATE: 97 BPM | WEIGHT: 210.3 LBS | OXYGEN SATURATION: 99 % | BODY MASS INDEX: 35.04 KG/M2 | DIASTOLIC BLOOD PRESSURE: 64 MMHG | HEIGHT: 65 IN | SYSTOLIC BLOOD PRESSURE: 108 MMHG

## 2024-07-15 DIAGNOSIS — Z36.2 ENCOUNTER FOR FOLLOW-UP ULTRASOUND OF FETAL ANATOMY: ICD-10-CM

## 2024-07-15 DIAGNOSIS — O24.410 DIET CONTROLLED GESTATIONAL DIABETES MELLITUS (GDM) IN THIRD TRIMESTER: Primary | ICD-10-CM

## 2024-07-15 DIAGNOSIS — O24.410 DIET CONTROLLED GESTATIONAL DIABETES MELLITUS (GDM) IN THIRD TRIMESTER: ICD-10-CM

## 2024-07-15 DIAGNOSIS — O99.213 OBESITY AFFECTING PREGNANCY IN THIRD TRIMESTER, UNSPECIFIED OBESITY TYPE: ICD-10-CM

## 2024-07-15 DIAGNOSIS — Z36.89 ENCOUNTER FOR ULTRASOUND TO CHECK FETAL GROWTH: ICD-10-CM

## 2024-07-15 DIAGNOSIS — Z3A.28 28 WEEKS GESTATION OF PREGNANCY: Primary | ICD-10-CM

## 2024-07-15 DIAGNOSIS — O36.63X0 MACROSOMIA OF FETUS AFFECTING MANAGEMENT OF MOTHER IN THIRD TRIMESTER, SINGLE OR UNSPECIFIED FETUS: ICD-10-CM

## 2024-07-15 DIAGNOSIS — O09.899 SHORT INTERVAL BETWEEN PREGNANCIES AFFECTING PREGNANCY, ANTEPARTUM: ICD-10-CM

## 2024-07-15 PROCEDURE — 99214 OFFICE O/P EST MOD 30 MIN: CPT | Performed by: STUDENT IN AN ORGANIZED HEALTH CARE EDUCATION/TRAINING PROGRAM

## 2024-07-15 PROCEDURE — 76816 OB US FOLLOW-UP PER FETUS: CPT | Performed by: STUDENT IN AN ORGANIZED HEALTH CARE EDUCATION/TRAINING PROGRAM

## 2024-07-15 NOTE — PROGRESS NOTES
"[facility]: Ms. Huber was seen today for fetal growth and followup missed anatomy ultrasound.  See ultrasound report under \"OB Procedures\" tab.      MDM:   I. Diagnoses/Problems addressed:  Acute uncomplicated illness/injury: GDMA1 (ex. GDMA1, MO, UTI)  II.  Data: I reviewed 2 lab tests ordered by another provider.  III.  Risk of morbidity: Low    Please don't hesitate to contact our office with any concerns or questions.  -Preethi Espinal MD    "

## 2024-07-17 ENCOUNTER — TELEPHONE (OUTPATIENT)
Age: 29
End: 2024-07-17

## 2024-07-17 ENCOUNTER — ROUTINE PRENATAL (OUTPATIENT)
Dept: OBGYN CLINIC | Facility: MEDICAL CENTER | Age: 29
End: 2024-07-17
Payer: COMMERCIAL

## 2024-07-17 VITALS
DIASTOLIC BLOOD PRESSURE: 70 MMHG | SYSTOLIC BLOOD PRESSURE: 106 MMHG | WEIGHT: 209 LBS | HEART RATE: 110 BPM | BODY MASS INDEX: 34.82 KG/M2 | HEIGHT: 65 IN

## 2024-07-17 DIAGNOSIS — E66.09 OTHER OBESITY DUE TO EXCESS CALORIES AFFECTING PREGNANCY IN SECOND TRIMESTER: ICD-10-CM

## 2024-07-17 DIAGNOSIS — O99.212 OTHER OBESITY DUE TO EXCESS CALORIES AFFECTING PREGNANCY IN SECOND TRIMESTER: ICD-10-CM

## 2024-07-17 DIAGNOSIS — O36.62X0: ICD-10-CM

## 2024-07-17 DIAGNOSIS — Z3A.28 28 WEEKS GESTATION OF PREGNANCY: ICD-10-CM

## 2024-07-17 DIAGNOSIS — O24.410 DIET CONTROLLED GESTATIONAL DIABETES MELLITUS (GDM) IN THIRD TRIMESTER: Primary | ICD-10-CM

## 2024-07-17 PROBLEM — N89.8 VAGINAL DISCHARGE: Status: RESOLVED | Noted: 2024-07-08 | Resolved: 2024-07-17

## 2024-07-17 LAB
SL AMB  POCT GLUCOSE, UA: NORMAL
SL AMB POCT URINE PROTEIN: NORMAL

## 2024-07-17 PROCEDURE — 81002 URINALYSIS NONAUTO W/O SCOPE: CPT | Performed by: CLINICAL NURSE SPECIALIST

## 2024-07-17 PROCEDURE — PNV: Performed by: CLINICAL NURSE SPECIALIST

## 2024-07-17 NOTE — TELEPHONE ENCOUNTER
Attempted to call PT to r/s class 1 and 2 sooner than scheduled. PT did not answer, unable to LVM. Sent [employer] message.

## 2024-07-17 NOTE — ASSESSMENT & PLAN NOTE
28w2d  Doing well,  reports good FM  Recent labs were reviewed  Tdap next visit. Briefly discussed and agreeable    The patient was counseled about the labor process. She was counseled regarding potential reasons that she may need a  section including arrest of dilation/descent, non-reassuring fetal status, or worsening maternal status.      She was counseled on the risks of  including bleeding, infection, and injury to surrounding structures including the bowel and bladder. She was counseled that there are medical and surgical methods to manage excessive postpartum bleeding. She was counseled that in the event of excessive blood loss, she may require blood transfusion which includes a small risk of blood borne diseases such as hepatitis and HIV. The patient is OK with receiving a blood transfusion if necessary.  The patient had an opportunity to ask questions and signed consent.      She was counseled about the possible need for operative delivery using the vacuum or forceps and the indications for doing so. She was counseled that there is a small risk of  complications including intracranial hemorrhage, lacerations, nerve damage or fracture as well as the increased risk for more severe maternal laceration. The patient signed consent.   Warning signs in 3rd trimester of pregnancy reviewed as well as PTL and pre-e precautions.  Fetal activity/fetal kick counts reviewed and to call with decreased FM  3rd trimester education folder given today along with form for breast pump if Breastfeeding desires.   Reviewed availability of Childbirth education classes offered by [facility] and encouraged

## 2024-07-17 NOTE — ASSESSMENT & PLAN NOTE
Recent diagnosed with GDM earlier this week. Has not had classes scheduled yet.  Just had growth US on 7/15 showing EFW 95% AC 97%, nml JANET.  Continue to follow w/ MFM  Serial US for growth

## 2024-07-17 NOTE — PROGRESS NOTES
Prenatal Visit  Subjective:   Marci is a 29 y.o.  28w2d here for Routine Prenatal Visit (Francis 10/7/24 /w2d/B+/Yellow folder given and reviewed/Breast pump formula feeding /Peds not needed /Tdap next visit /Urine )    Patient denies contractions or vaginal bleeding. Also denies LOF or unusual vaginal discharge.  She describes the fetus as active.      Objective:  Vitals:    24 0842   BP: 106/70   Pulse: (!) 110     Pregravid Weight/BMI: 90.7 kg (200 lb) (BMI 33.28)  Current Weight: 94.8 kg (209 lb)   Total Weight Gain: 4.082 kg (9 lb)     OBGyn Exam    Fetal Heart Rate: 155 , Fundal Height (cm): 29 cm    Assessment & Plan:          1. Diet controlled gestational diabetes mellitus (GDM) in third trimester  Assessment & Plan:  Recent diagnosed with GDM earlier this week. Has not had classes scheduled yet.  Just had growth US on 7/15 showing EFW 95% AC 97%, nml JANET.  Continue to follow w/ MFM  Serial US for growth   2. Macrosomia affecting management of mother, second trimester, not applicable or unspecified fetus  Overview:  Noted at 24 weeks.  She was diagnosed with macrosomia in her previous pregnancy by ultrasound and ultimately delivered an 8 pound 6 ounce baby at 39 weeks.  Rpt US ordered in 4 wks   3. 28 weeks gestation of pregnancy  Overview:  Up to date to 28 wk visit  Tdap: [ ]  Flu/covid-done   GBS swab:     Yellow folder: given   Breast pump order:  L&D forms:  Delivery consent: signed    IOL:   Assessment & Plan:  28w2d  Doing well,  reports good FM  Recent labs were reviewed  Tdap next visit. Briefly discussed and agreeable    The patient was counseled about the labor process. She was counseled regarding potential reasons that she may need a  section including arrest of dilation/descent, non-reassuring fetal status, or worsening maternal status.      She was counseled on the risks of  including bleeding, infection, and injury to surrounding structures including the  bowel and bladder. She was counseled that there are medical and surgical methods to manage excessive postpartum bleeding. She was counseled that in the event of excessive blood loss, she may require blood transfusion which includes a small risk of blood borne diseases such as hepatitis and HIV. The patient is OK with receiving a blood transfusion if necessary.  The patient had an opportunity to ask questions and signed consent.      She was counseled about the possible need for operative delivery using the vacuum or forceps and the indications for doing so. She was counseled that there is a small risk of  complications including intracranial hemorrhage, lacerations, nerve damage or fracture as well as the increased risk for more severe maternal laceration. The patient signed consent.   Warning signs in 3rd trimester of pregnancy reviewed as well as PTL and pre-e precautions.  Fetal activity/fetal kick counts reviewed and to call with decreased FM  3rd trimester education folder given today along with form for breast pump if Breastfeeding desires.   Reviewed availability of Childbirth education classes offered by [facility] and encouraged  Orders:  -     POCT urine dip  4. Other obesity due to excess calories affecting pregnancy in second trimester  Overview:  Pre-gest BMI 32  nml early glucola  Assessment & Plan:  No wt gain since last visit.   Overall  9lbs   Continue Low Dose Aspirin until 36 wks      KATT Ruth  2024

## 2024-07-17 NOTE — PATIENT INSTRUCTIONS
Call MFM to schedule diabetes appt.  Contact information for [facility] (AKA Maternal Fetal Medicine)  Main Number [phone]

## 2024-07-17 NOTE — TELEPHONE ENCOUNTER
Patient called to to see if she is getting a glucose meter, lancets, and test strips prescription sent over to her. Her appoints are scheduled for 8/6 and 8/13 she is also wanting to know what to do in the mean time before the virtual visits on what she should be doing. Patient would like a call back.

## 2024-07-18 DIAGNOSIS — Z36.82 ENCOUNTER FOR (NT) NUCHAL TRANSLUCENCY SCAN: ICD-10-CM

## 2024-07-18 DIAGNOSIS — O99.211 OBESITY AFFECTING PREGNANCY IN FIRST TRIMESTER, UNSPECIFIED OBESITY TYPE: ICD-10-CM

## 2024-07-18 DIAGNOSIS — Z3A.12 12 WEEKS GESTATION OF PREGNANCY: ICD-10-CM

## 2024-07-22 ENCOUNTER — TELEMEDICINE (OUTPATIENT)
Dept: PERINATAL CARE | Facility: CLINIC | Age: 29
End: 2024-07-22
Payer: COMMERCIAL

## 2024-07-22 DIAGNOSIS — O36.63X0 MACROSOMIA OF FETUS AFFECTING MANAGEMENT OF MOTHER IN THIRD TRIMESTER, SINGLE OR UNSPECIFIED FETUS: ICD-10-CM

## 2024-07-22 DIAGNOSIS — Z3A.29 29 WEEKS GESTATION OF PREGNANCY: ICD-10-CM

## 2024-07-22 DIAGNOSIS — E66.09 OTHER OBESITY DUE TO EXCESS CALORIES AFFECTING PREGNANCY IN SECOND TRIMESTER: ICD-10-CM

## 2024-07-22 DIAGNOSIS — O24.410 DIET CONTROLLED GESTATIONAL DIABETES MELLITUS (GDM) IN THIRD TRIMESTER: Primary | ICD-10-CM

## 2024-07-22 DIAGNOSIS — O24.419 GESTATIONAL DIABETES MELLITUS (GDM) IN THIRD TRIMESTER, GESTATIONAL DIABETES METHOD OF CONTROL UNSPECIFIED: Primary | ICD-10-CM

## 2024-07-22 DIAGNOSIS — O99.212 OTHER OBESITY DUE TO EXCESS CALORIES AFFECTING PREGNANCY IN SECOND TRIMESTER: ICD-10-CM

## 2024-07-22 PROCEDURE — G0108 DIAB MANAGE TRN  PER INDIV: HCPCS

## 2024-07-22 RX ORDER — BLOOD-GLUCOSE METER
EACH MISCELLANEOUS
Qty: 1 KIT | Refills: 0 | Status: SHIPPED | OUTPATIENT
Start: 2024-07-22 | End: 2024-10-07

## 2024-07-22 RX ORDER — BLOOD SUGAR DIAGNOSTIC
STRIP MISCELLANEOUS
Qty: 100 STRIP | Refills: 5 | Status: SHIPPED | OUTPATIENT
Start: 2024-07-22 | End: 2024-10-07

## 2024-07-22 RX ORDER — LANCETS 33 GAUGE
EACH MISCELLANEOUS
Qty: 100 EACH | Refills: 5 | Status: SHIPPED | OUTPATIENT
Start: 2024-07-22 | End: 2024-10-07

## 2024-07-22 NOTE — PROGRESS NOTES
"CLASS 1 - Individual  (virtual visit)    Thank you for referring your patient to [facility] Maternal Fetal Medicine Diabetes and Pregnancy Program.     Subjective:     Marci Huber is a 29 y.o. female who presents today with spouse for Virtual Regular Visit, Patient Education, and Gestational Diabetes. Patient is at 29w0d gestation, Estimated Date of Delivery: 10/7/24.     Learns best by: Auditory (Lecture, Group Discussion)  Primary Support Person: Spouse  Sonu with stress by:  Time to self  How do you feel the diabetes diagnosis will affect the rest of your pregnancy? Answer: \"Overwhelmed\"     Reviewed and updated the following from patients medical record: Demographics, Education, Occupation, PMH, Problem List, Allergies, and Current Medications. D&P Assessment responses have been reported throughout the following note.    Visit Diagnosis:  Encounter Diagnosis     ICD-10-CM    1. Diet controlled gestational diabetes mellitus (GDM) in third trimester  O24.410 Ambulatory Referral to Maternal Fetal Medicine     Mychart glucose flowsheet      2. 29 weeks gestation of pregnancy  Z3A.29 Mychart glucose flowsheet      3. Macrosomia of fetus affecting management of mother in third trimester, single or unspecified fetus  O36.63X0 Mychart glucose flowsheet      4. Other obesity due to excess calories affecting pregnancy in second trimester  O99.212 Mychart glucose flowsheet    E66.09            Discussed with patient:   - Pathophysiology of Diet controlled gestational diabetes mellitus (GDM) in third trimester [O24.410].  - Untreated hyperglycemia in pregnancy and maternal fetal complications (fetal macrosomia,  hypoglycemia, polyhydramnios, increased incidence of  section,  labor, and in severe cases fetal demise and still birth).   - Importance of blood glucose monitoring, nutrition, and medication if necessary in achieving BG goals.     HPI  Personal History of GDM? No  Family History of " "Diabetes? No     Family History   Problem Relation Age of Onset    No Known Problems Mother     Hyperlipidemia Father     No Known Problems Sister     No Known Problems Brother     No Known Problems Maternal Grandmother     No Known Problems Maternal Grandfather     Heart disease Paternal Grandmother     Heart failure Paternal Grandfather     Hyperlipidemia Paternal Grandfather     No Known Problems Son     Diabetes Neg Hx         Labs    Lab Results   Component Value Date/Time    [id number] 152 (H) 07/03/2024 08:23 AM      Lab Results   Component Value Date/Time    GLUF 85 07/12/2024 07:17 AM    ILKCTNJ7GD 172 07/12/2024 08:25 AM    UTOAXIT3NW 158 (H) 07/12/2024 09:23 AM    HMLETJG1YW 140 (H) 07/12/2024 10:23 AM      No results found for: \"HGBA1C\"     Ordered This Visit: None    Current Outpatient Medications  Diabetic Medications: None      Current Outpatient Medications:     aspirin (ECOTRIN LOW STRENGTH) 81 mg EC tablet, Take 2 tablets (162 mg total) by mouth daily Stop at 36 weeks.  Contact your OB or MFM with any side effects.  See [email or web address].[email or web address], Disp: 60 tablet, Rfl: 0    Prenatal Vit-Fe Fumarate-FA (prenatal multivitamin) 28-0.8 MG TABS, Take 1 tablet by mouth daily, Disp: , Rfl:      Anthropometrics:    Pre-Pregnancy:   Pre-Gravid Wt: 90.7 kg (200 lb)  Pre-Gravid BMI: 33.28    Current:  Ht Readings from Last 1 Encounters:   07/17/24 5' 5\" (1.651 m)      Wt Readings from Last 3 Encounters:   07/17/24 94.8 kg (209 lb)   07/15/24 95.4 kg (210 lb 4.8 oz)   07/08/24 94.9 kg (209 lb 3.2 oz)      Current BMI: There is no height or weight on file to calculate BMI.    Weight Gain in Pregnancy:  Current TWG (4.082 kg (9 lb)) compared to recommended TWG (5 kg (11 lb)-9 kg (19 lb)): Within Range -- Continue recommended rate of weight gain based on pre-pregnancy BMI till delivery    Most Recent Ultrasound Results:  Findings: Indicates *Possible* Fetal Macrosomia, AC: 97%  EFW 95%, NML " "JANET  Addit. Fetal Surveillance: None  Next US date: Scheduled Appropriately    Blood Glucose Monitoring:   Ordered Glucose Meter: OneTouch Verio Flex   Meter Teaching: Gave instruction on site selection, skin preparation, loading strips and lancet device, meter activation, obtaining blood sample, test strip and lancet disposal and storage, and recording log book entries.     BG Monitoring Recommendations:  Frequency: 4 x per day (Fasting, 2 hour after start of each meal)  Goals: (Fasting) 60-95mg/dL // (2hr PP) <120mg/dL  Reporting: Weekly via [employer] Glucose Flowsheet     Meal Plan:     Review of Patient's Current Diet:  Type of Diet: Regular   Special or ethnic dietary preferences? no  Food Allergies: None  Food Intolerances:  High Sugar  Food Dislikes:  Cottage Cheese  Receiving WIC or food stamps? No    Typical Timing/Frequency of Meals and Snacks:   # Meals Daily: 3  # Snacks Daily?  0    Meal Plan Recommendations Reviewed:  Daily Calories/Carbohydrate/Protein: 2000 calorie (CHO: 45-15-60-15-60-30) (PRO: 2/3-1-3/4-1-3/4-2)   Appropriate amounts of CHO, PRO, and Fat at each meal and snack.   CHO exchange list, and portion sizes for both CHO and PRO  How to read a food label  Suggested meal/snack options to increase nutrition and maintain consistent meal and snack intakes  Eat every 2.0-3.5 hours while awake  Go no longer than 8-10 hours fasting overnight until first meal of the day.     Physical Activity:  Currently physically active?  No    Physical Activity Recommendations Reviewed:   Benefits of physical activity to optimize blood glucose control, encouraged activity at patient is physically able.   Instructed pt to always consult a physician prior to starting an exercise program.   Recommend 20-30 minutes daily.     Patient Stated Goal: \"I will walk 20-30 minutes after dinner daily\"  Expected Compliance: good  Barriers to Learning/Change: No Barriers  Diabetes Self Management Support Plan (outside of ongoing " care): Spouse/Family     Date to Report Blood Sugars: Day Before Class 2, Then Weekly (till delivery)    Class 2: Return in 1 week (on 2024) for Class 2 w/ Donita Brown RD.   *Patient instructed to bring 3 day food diary and/or write down foods associated with elevated after meal blood sugars    Begin Time: 2:30pm    End Time: 3:40pm    It was a pleasure working with them today. Please feel free to call ([phone]) with any questions or concerns.    Donita Brown RD   Diabetes Educator  [facility] Maternal Fetal Medicine  Diabetes and Pregnancy Program  [address] [place] [address], [address]  [place] [address]    Virtual Regular Visit  Name: Marci Huber      : 1995      MRN: [id number]  Encounter Provider: Donita Brown RD  Encounter Date: 2024   Encounter department: [facility]    Verification of patient location:    Patient is located at Home in the following state in which I hold an active license [place]    Assessment & Plan   1. Diet controlled gestational diabetes mellitus (GDM) in third trimester  -     Ambulatory Referral to Maternal Fetal Medicine  -     Mychart glucose flowsheet  2. 29 weeks gestation of pregnancy  -     Mychart glucose flowsheet  3. Macrosomia of fetus affecting management of mother in third trimester, single or unspecified fetus  -     Mychart glucose flowsheet  4. Other obesity due to excess calories affecting pregnancy in second trimester  -     Mychart glucose flowsheet        Encounter provider Donita Brown RD    The patient was identified by name and date of birth. Marci Collierabrese was informed that this is a telemedicine visit and that the visit is being conducted through the [employer] platform. She agrees to proceed..  My office door was closed. No one else was in the room.  She acknowledged consent and understanding of privacy and security of the video platform. The patient has agreed to participate and understands they can  discontinue the visit at any time.    Patient is aware this is a billable service.     History of Present Illness     Marci Huber is a 29 y.o. female who presents pregnant.    Review of Systems    Objective     LMP 12/22/2023 (Exact Date)   Physical Exam    Visit Time  Total Visit Duration: 70min

## 2024-07-22 NOTE — PATIENT INSTRUCTIONS
CLASS 1  Diabetes and Pregnancy Program   [facility] - Maternal Fetal Medicine    Diabetes Team:   KATT Tyler CDE   Avani Larsen RD CD (Judy)E MS Duckworth (Magalie) SUZANNE Brown MS, TACHO Santillan RD CDE MPH    Resource: Gestational Diabetes  ACOG     CHECKING BLOOD SUGARS    Always carry your meter and testing supplies with you at all times. Bring your glucose meter to all your appointments in our [facility] office.     Prescription for Meter/Strips/Lancets:   A request for a glucose meter, test strips and lancets was sent to the provider for approval.   Once your prescriptions are approved by the provider, your prescription will be sent electronically to your pharmacy.   Be mindful the provider is seeing patients in the office today so allow ample time for your prescriptions to be approved.   Call your pharmacy to verify your medication was received electronically and is ready for pick-up before going to pharmacy. If you have any issues with coverage or your meter is unavailable, please reach out to our office at [phone].     Coupons/Savings:   One Touch Verio: RX Finder  Automatic Savings for Diabetes Supplies  OneTouch®   Contour Next: Contour® NEXT Test Strips Discounts  [facility]     How to Check Blood Sugars:  Wash your hands with soap and water or use waterless  to clean your hands.  Alcohol can dry your fingers and is not recommended.  Alcohol can also cause false, elevated glucose readings.  AVOID scented soaps and hand sanitizers - scented soaps may include sugar which can cause inaccurate/elevated readings   Gather your glucose meter kit and supplies.  Prepare your meter by inserting a new test strip.   This will automatically turn on your meter  Make sure test strips are not .  Use a new test strip each time.   Prepare your lancing device by inserting the lancet               After the lancet is securely in place, twist off  "the top to reveal needle.   Reattach lancing device top   Once lancing device top is reattached, you are now ready to prick the side of your fingertip to get a blood sample.  You can adjust the depth setting as needed. We recommend to start at \"4\".   Apply the blood sample to test strip according to instructions.   Make sure your sharp container is: heavy duty plastic, puncture-resistant lid, upright and stable, leak resistant, properly labeled.   Record your blood sugar     Additional References and Video:   One Touch Verio: OneTouch Verio Flex®  Blood Glucose Meter  OneTouch®   Contour Next: Instructional Videos - [facility]     Frequency: 4 Times Daily     Timing:   Fasting   Test when you wake up before you have anything to eat or drink  At least 8hrs but no more than 10hrs from your bedtime snack  Exceeding 10hrs may result in inaccurate readings  2 hrs after the first bite of your meal (breakfast, lunch, dinner) **do not test for snacks    Goals:    Fasting  60-95   1 Hour   After Meals <140   2 Hours   After Meals <120   *please inform member of diabetes team if you begin testing 1hr blood sugars    REPORTING BLOOD SUGARS:   Please only pick ONE way to report to our team. Choose the best option for you.     [employer] Blood Glucose Flow sheet under \"Track my Health\"   Remember to click \"save\" for your readings to automatically upload into Epic.   [employer] Message with Attachment(s)  Send a picture of a written blood sugar log   To: KATT Tyler (Medical Question - Non Urgent)  You may include up to 3 images per message  Leave Voicemail at [phone]   Include: Name, Date of Birth, and Date + Blood Sugars    The diabetes team will review your blood sugar log weekly till delivery.             MEAL PLAN AND DIET INSTRUCTIONS    *Carbohydrates: Sources of food that increase your blood sugar (include: starches, fruits, milk, desserts, starchy vegetables such as corn, peas, squash)    Meal Plan " (3 Meals and 3 Snacks)  Outlines grams of carbohydrates to have at each meal and snack  Minimum Carbohydrate Intake Daily (avoid ketosis): 175g *to be distributed throughout day as instructed in meal plan  Include Protein at Every Meal and Snack  Eat all 3 meals and 3 snacks  Eating every 2 to 3.5 hours during the day.   Preferably at the same times every day.   Avoid going long periods of time without eating.        Be sure to have bedtime snack that includes at least 30 grams of carbohydrates and 2 ounces (14 grams) of protein.  Refer to Food Lists in Booklet (pages 15-17)   Each food listed is equal to 15g (1 Carbohydrate Serving)  Read Food Label   Check Total Carbohydrate  15g = 1 Carbohydrate Serving  Check Serving Size!   The total carbohydrate amount listed is based on 1 serving size  Be careful as many items contain more than one serving per package  Check Total Sugars  Choose items with <15g per serving of total sugar    Exercise:  If ok by your OB try adding a 20-30 minute walk after dinner to help keep fasting blood sugar within range.  Try incorporating at least 10 minutes of walking after each meal  Resource: Exercise During Pregnancy  ACOG     Additional Information: (to be reviewed at class 2)  Continue to follow up with your OB and MFM providers as recommended.  Always have glucose available for hypoglycemia, use 15 by 15 rule. (Page 29 in booklet)  While sick or feeling ill, follow our sick day guidelines in our GDM booklet. (Page 28 in booklet)  For travel and dining out tips refer to your GDM booklet. See attachment (Page 31 in booklet)    Class 2 Information: Approximately 1 week following Class 1  Bring 3 Day Food Log (everything you eat and drink for each meal and snack) or write down meals associated with elevated after meal blood sugars  Will review diet more in depth and provide feedback     Remember: Importance of maintaining tight control of blood sugars during pregnancy = decrease risk  factors including fetal macrosomia; birth injury; risk of ; polyhydramnios; pre-term labor; pre-eclampsia;  hypoglycemia; jaundice and stillbirth.    Any questions give us a call at [phone] or send us a MyChart message to KATT Tyler.     Donita Brown RD  Diabetes Educator   The Diabetes and Pregnancy Program  At [facility] - Maternal Fetal Medicine

## 2024-07-26 ENCOUNTER — ROUTINE PRENATAL (OUTPATIENT)
Dept: OBGYN CLINIC | Facility: CLINIC | Age: 29
End: 2024-07-26
Payer: COMMERCIAL

## 2024-07-26 VITALS
DIASTOLIC BLOOD PRESSURE: 76 MMHG | WEIGHT: 208.2 LBS | BODY MASS INDEX: 34.65 KG/M2 | SYSTOLIC BLOOD PRESSURE: 126 MMHG | HEART RATE: 86 BPM

## 2024-07-26 DIAGNOSIS — O36.62X0: ICD-10-CM

## 2024-07-26 DIAGNOSIS — Z3A.28 28 WEEKS GESTATION OF PREGNANCY: Primary | ICD-10-CM

## 2024-07-26 DIAGNOSIS — E66.09 OTHER OBESITY DUE TO EXCESS CALORIES AFFECTING PREGNANCY IN SECOND TRIMESTER: ICD-10-CM

## 2024-07-26 DIAGNOSIS — O24.410 DIET CONTROLLED GESTATIONAL DIABETES MELLITUS (GDM) IN THIRD TRIMESTER: ICD-10-CM

## 2024-07-26 DIAGNOSIS — Z23 NEED FOR TDAP VACCINATION: ICD-10-CM

## 2024-07-26 DIAGNOSIS — O99.212 OTHER OBESITY DUE TO EXCESS CALORIES AFFECTING PREGNANCY IN SECOND TRIMESTER: ICD-10-CM

## 2024-07-26 LAB
SL AMB  POCT GLUCOSE, UA: NEGATIVE
SL AMB POCT URINE PROTEIN: NEGATIVE

## 2024-07-26 PROCEDURE — 90715 TDAP VACCINE 7 YRS/> IM: CPT | Performed by: OBSTETRICS & GYNECOLOGY

## 2024-07-26 PROCEDURE — PNV: Performed by: OBSTETRICS & GYNECOLOGY

## 2024-07-26 PROCEDURE — 81002 URINALYSIS NONAUTO W/O SCOPE: CPT | Performed by: OBSTETRICS & GYNECOLOGY

## 2024-07-26 PROCEDURE — 90471 IMMUNIZATION ADMIN: CPT | Performed by: OBSTETRICS & GYNECOLOGY

## 2024-07-26 NOTE — ASSESSMENT & PLAN NOTE
Pt doing well today, no complaints  +FM. Denies ctx, vb and lof  Signed delivery consents last visit  Tdap today   Will try to bring birth plan to next visit  Plan for f/u in 2 weeks

## 2024-07-26 NOTE — PROGRESS NOTES
"Problem List Items Addressed This Visit       Obesity complicating pregnancy in second trimester    28 weeks gestation of pregnancy - Primary     Pt doing well today, no complaints  +FM. Denies ctx, vb and lof  Signed delivery consents last visit  Tdap today   Will try to bring birth plan to next visit  Plan for f/u in 2 weeks         Relevant Orders    POCT urine dip (Completed)    Macrosomia affecting management of mother, second trimester, not applicable or unspecified fetus    Diet controlled gestational diabetes mellitus (GDM) in third trimester     BG mostly within goal. Had a pancake this AM and was elevated, but otherwise normal.   Last growth u/s on 7/15 EFW 95%  Has follow up growth on   No results found for: \"HGBA1C\"          Other Visit Diagnoses       Need for Tdap vaccination        Relevant Orders    TDAP VACCINE GREATER THAN OR EQUAL TO 6YO IM (Completed)            Marci Huber is a 29 y.o.  at 29w4d who presents today for routine prenatal visit.     /76 (BP Location: Right arm, Patient Position: Sitting, Cuff Size: Large)   Pulse 86   Wt 94.4 kg (208 lb 3.2 oz)   LMP 2023 (Exact Date)   BMI 34.65 kg/m²       FH 29 cm    "

## 2024-07-26 NOTE — ASSESSMENT & PLAN NOTE
"BG mostly within goal. Had a pancake this AM and was elevated, but otherwise normal.   Last growth u/s on 7/15 EFW 95%  Has follow up growth on 8/12  No results found for: \"HGBA1C\"  "

## 2024-07-26 NOTE — PROGRESS NOTES
Prenatal visit 29w4d  Denies cramping, bleeding, leakage of fluid. Normal fetal movement.  Breast pump declined  Tdap desired today  Delivery consent previously signed  Birth plan to be reviewed next visit

## 2024-07-29 ENCOUNTER — TELEMEDICINE (OUTPATIENT)
Dept: PERINATAL CARE | Facility: CLINIC | Age: 29
End: 2024-07-29
Payer: COMMERCIAL

## 2024-07-29 DIAGNOSIS — O24.410 DIET CONTROLLED GESTATIONAL DIABETES MELLITUS (GDM) IN THIRD TRIMESTER: Primary | ICD-10-CM

## 2024-07-29 DIAGNOSIS — Z3A.30 30 WEEKS GESTATION OF PREGNANCY: ICD-10-CM

## 2024-07-29 DIAGNOSIS — O99.212 OTHER OBESITY DUE TO EXCESS CALORIES AFFECTING PREGNANCY IN SECOND TRIMESTER: ICD-10-CM

## 2024-07-29 DIAGNOSIS — E66.09 OTHER OBESITY DUE TO EXCESS CALORIES AFFECTING PREGNANCY IN SECOND TRIMESTER: ICD-10-CM

## 2024-07-29 PROCEDURE — G0108 DIAB MANAGE TRN  PER INDIV: HCPCS

## 2024-07-29 NOTE — PROGRESS NOTES
"CLASS 2 - Individual  (virtual visit)    Thank you for referring your patient to [facility] Maternal Fetal Medicine Diabetes and Pregnancy Program.     Marci Huber is a  29 y.o. female who presents today unaccompanied for Virtual Regular Visit, Patient Education, and Gestational Diabetes.  Patient is at 30w0d gestation, Estimated Date of Delivery: 10/7/24.     Visit Diagnosis:  Encounter Diagnosis     ICD-10-CM    1. Diet controlled gestational diabetes mellitus (GDM) in third trimester  O24.410       2. 30 weeks gestation of pregnancy  Z3A.30       3. Other obesity due to excess calories affecting pregnancy in second trimester  O99.212     E66.09            Reviewed and updated the following from patients medical record: PMH, Problem List, Allergies, and Current Medications.    Labs  GDM LABS: See Class 1 Note    A1C:  No results found for: \"HGBA1C\"     Labs Ordered This Visit: None    Current Medications:    Current Outpatient Medications:     aspirin (ECOTRIN LOW STRENGTH) 81 mg EC tablet, Take 2 tablets (162 mg total) by mouth daily Stop at 36 weeks.  Contact your OB or MFM with any side effects.  See [email or web address], Disp: 60 tablet, Rfl: 0    Blood Glucose Monitoring Suppl (OneTouch Verio Flex System) w/Device KIT, Test x4 Daily or as instructed, Disp: 1 kit, Rfl: 0    Ferrous Sulfate (IRON PO), Take by mouth, Disp: , Rfl:     OneTouch Delica Lancets 33G MISC, Use 4 a Day or as instructed, Disp: 100 each, Rfl: 5    OneTouch Verio test strip, Test 4 Times Daily or as instructed, Disp: 100 strip, Rfl: 5    Prenatal Vit-Fe Fumarate-FA (prenatal multivitamin) 28-0.8 MG TABS, Take 1 tablet by mouth daily, Disp: , Rfl:      Anthropometrics:  Ht Readings from Last 1 Encounters:   07/17/24 5' 5\" (1.651 m)      Wt Readings from Last 3 Encounters:   07/26/24 94.4 kg (208 lb 3.2 oz)   07/17/24 94.8 kg (209 lb)   07/15/24 95.4 kg (210 lb 4.8 oz)        Pre-Gravid Wt Pre-Gravid BMI TWG   90.7 kg " (200 lb) 33.28 3.719 kg (8 lb 3.2 oz)     Total Pregnancy Weight Gain Recommendations: BMI (> 30) 11-20 lbs  Current Wt Status Compared to Recommendations: Within Range -- Continue recommended rate of weight gain based on pre-pregnancy BMI till delivery    Most Recent Ultrasound Results:  Findings: (7/15/24) LVL 1 US; 28w0d - Possible Macrosomia; AC: 97%, EFW: 95%, NML JANET 11.9cm  Further Fetal Surveillance: None  Next US date: Scheduled Appropriately    BLOOD GLUCOSE MONITORING:   Glucometer: OneTouch Verio Flex     Reinforced at Today's Visit:   Timing/Frequency of SMB x per day (Fasting, 2 hour after start of each meal)  Goals: (Fasting) 60-95mg/dL // (2hr PP) <120mg/dL  Reporting Guidelines: Weekly via Phone: [phone] OR My Chart (Message with image attachment) OR Glucose Flowsheet  Method of Reporting: [employer] Glucose Flowsheet    BG LOG:           Review of Blood Glucose Log:   Indicates good glycemic control  FBG = Well controlled  Post-Prandial BG = Well controlled    MEAL PLAN (Patient was provided with a meal plan including 3 meals and 3 snacks at class 1)  *Calories: 2000 calorie (CHO:45-15-60-15-60-30) (PRO: 2/3-1-3/4-1-3/4-2)    Review of Patient's Current Diet: refer to class 1 note for additional details    Wake-up: 6:30am  Breakfast (6:30-7am): 1 Slice of Wheat Toast + 2 Scrambled Egg w/ Cheese   AM Snack: Yogurt + Granola  Lunch (11:30am): At Work; Turkey + Cheese on Wheat Bread + 2 String Cheese + Apple Slices   PM Snack: [employer] Chocolate Bar + Apple Slices  Dinner (5:30pm): Salad  Bedtime Snack: Chocolate and Peanut Butter Juan Crackers + Milk    Beverages: No Sugar Sweetened Beverages    Meal Plan Recommendations Compliant?   Consistent CHO Intake Yes   3 Meals and 3 Snacks Yes   Protein w/ Every Meal and Snack Yes   Eating every 2-3.5hrs while awake  Yes   8-10hrs Fasting (from time of bedtime snack until first meal of the day) Yes     Overall Impression: Pt has a good compliance  and understanding of diet recommendations at this time.    Reinforced Diet Instructions:  Individualized meal plan.   Importance of consistent carbohydrate intake via 3 meals and 3 snacks per day   Importance of protein as it relates to blood glucose control.   Encouraged  patient to eat every 2.0-3.5 hours while awake  Encouraged patient to go no longer than 8-10 hours fasting overnight until first meal of the day.  Provided suggested meal/snack options to increase nutrition and maintain consistent meal and snack intakes.    Physical Activity:  Currently physically active? Yes, Walking daily    Reviewed w/ Pt:   Benefits of physical activity to optimize blood glucose control, encouraged activity at patient is physically able.   Instructed pt to always consult a physician prior to starting an exercise program.   Recommend 20-30 minutes daily.    Additional Topics Reviewed:    Medications: (reviewed options available with pt)  Discussed if blood sugars are not within normal range with meal planning and exercise  Reviewed medication such as metformin and/or basal/bolus insulin may be needed for better glucose control  Maternal-Fetal Testing:   Ultrasounds: growth scans every 4 weeks.  NST: twice weekly starting at 32nd week GA  JANET:  weekly starting at 32 weeks GA  Sick day Guidelines:   Advised that sickness will raise blood sugar   If blood sugar is > 160 mg/dL twice in one day call doctor  If on diabetes medications, continue as instructed   If unable to consume normal meal plan, instructed to remain well hydrated   Hypoglycemia & Treatment Guidelines:  Reviewed what hypoglycemia is, signs and symptoms, and how to treat via the 15:15 rule.  Post-Partum Guidelines:  Completion of 75 gm CHO 2 hr gtt at 6 weeks post-partum to check for Type 2 DM diagnosis  Breastfeeding Guidelines:  Continue GDM meal plan plus additional 350-500 calories daily  Examples of protein and carbohydrate snacks provided.  Stay hydrated by  "drinking 8-10 (8 oz.) fluids daily.  Dining Out & Travel Guidelines:  Patient advised to be prepared with extra diabetes supplies, medications, and snacks, as well as sticking to the same time schedule and portions eaten at home for meals and snacks.    Patient Stated Goal: \"I will walk 20-30 minutes after dinner daily\"  Goal Assessment: On track    Diabetes Self Management Support Plan outside of ongoing care: Spouse/Family    Barriers to Learning/Change: No Barriers  Expected Compliance: good    Date to report blood sugars: Weekly   Follow up:  Return per weekly review of blood sugars.     Begin Time: 10:00am  End Time: 11:00am    It was a pleasure working with them today. Please feel free to call ([phone]) with any questions or concerns.    Donita Brown RD   Diabetes Educator  [facility] Maternal Fetal Medicine  Diabetes and Pregnancy Program  [address] [place] [address], [address]  [place] [address]    Virtual Regular Visit  Name: Marci Huber      : 1995      MRN: [id number]  Encounter Provider: Donita Brown RD  Encounter Date: 2024   Encounter department: [facility]    Verification of patient location:    Patient is located at Home in the following state in which I hold an active license [place]    Assessment & Plan   1. Diet controlled gestational diabetes mellitus (GDM) in third trimester  2. 30 weeks gestation of pregnancy  3. Other obesity due to excess calories affecting pregnancy in second trimester        Encounter provider Donita Brown RD    The patient was identified by name and date of birth. Marci Huber was informed that this is a telemedicine visit and that the visit is being conducted through the Epic Embedded platform. She agrees to proceed..  My office door was closed. No one else was in the room.  She acknowledged consent and understanding of privacy and security of the video platform. The patient has agreed to participate and understands " they can discontinue the visit at any time.    Patient is aware this is a billable service.     History of Present Illness     Marci Huber is a 29 y.o. female who presents pregnant.    Review of Systems    Objective     LMP 12/22/2023 (Exact Date)   Physical Exam    Visit Time  Total Visit Duration: 60min

## 2024-08-12 ENCOUNTER — ULTRASOUND (OUTPATIENT)
Dept: PERINATAL CARE | Facility: CLINIC | Age: 29
End: 2024-08-12
Payer: COMMERCIAL

## 2024-08-12 VITALS
DIASTOLIC BLOOD PRESSURE: 70 MMHG | SYSTOLIC BLOOD PRESSURE: 124 MMHG | HEART RATE: 109 BPM | HEIGHT: 65 IN | WEIGHT: 207.8 LBS | BODY MASS INDEX: 34.62 KG/M2

## 2024-08-12 DIAGNOSIS — Z3A.29 29 WEEKS GESTATION OF PREGNANCY: ICD-10-CM

## 2024-08-12 DIAGNOSIS — O24.410 DIET CONTROLLED GESTATIONAL DIABETES MELLITUS (GDM) IN THIRD TRIMESTER: ICD-10-CM

## 2024-08-12 DIAGNOSIS — Z3A.32 32 WEEKS GESTATION OF PREGNANCY: Primary | ICD-10-CM

## 2024-08-12 DIAGNOSIS — E66.01 SEVERE OBESITY DUE TO EXCESS CALORIES AFFECTING PREGNANCY IN THIRD TRIMESTER (HCC): ICD-10-CM

## 2024-08-12 DIAGNOSIS — O99.213 SEVERE OBESITY DUE TO EXCESS CALORIES AFFECTING PREGNANCY IN THIRD TRIMESTER (HCC): ICD-10-CM

## 2024-08-12 DIAGNOSIS — O36.60X0 EXCESSIVE FETAL GROWTH, ANTEPARTUM, SINGLE OR UNSPECIFIED FETUS: ICD-10-CM

## 2024-08-12 DIAGNOSIS — O24.419 GESTATIONAL DIABETES MELLITUS (GDM) IN THIRD TRIMESTER, GESTATIONAL DIABETES METHOD OF CONTROL UNSPECIFIED: ICD-10-CM

## 2024-08-12 PROCEDURE — 76816 OB US FOLLOW-UP PER FETUS: CPT | Performed by: OBSTETRICS & GYNECOLOGY

## 2024-08-12 PROCEDURE — 99213 OFFICE O/P EST LOW 20 MIN: CPT | Performed by: OBSTETRICS & GYNECOLOGY

## 2024-08-12 NOTE — LETTER
August 12, 2024     Colleen Méndez DO  [address] [facility] [address]    Patient: Marci Huber   YOB: 1995   Date of Visit: 8/12/2024       Dear Dr. Méndez:    Thank you for referring Marci Huber to me for evaluation. Below are my notes for this consultation.    If you have questions, please do not hesitate to call me. I look forward to following your patient along with you.         Sincerely,        Izaiah Weller MD        CC: No Recipients    Izaiah Weller MD  8/12/2024  1:20 PM  Sign when Signing Visit  A fetal ultrasound was completed. See Ob procedures in Epic for an interpretation and recommendations. Do not hesitate to contact us in [facility] if you have questions.    Izaiah Weller MD, MSCE  Maternal Fetal Medicine

## 2024-08-12 NOTE — PROGRESS NOTES
A fetal ultrasound was completed. See Ob procedures in Epic for an interpretation and recommendations. Do not hesitate to contact us in [facility] if you have questions.    Izaiah Weller MD, MSCE  Maternal Fetal Medicine

## 2024-08-13 RX ORDER — BLOOD SUGAR DIAGNOSTIC
STRIP MISCELLANEOUS
Qty: 100 STRIP | Refills: 1 | Status: SHIPPED | OUTPATIENT
Start: 2024-08-13 | End: 2024-10-28

## 2024-08-13 RX ORDER — LANCETS 33 GAUGE
EACH MISCELLANEOUS
Qty: 100 EACH | Refills: 1 | Status: SHIPPED | OUTPATIENT
Start: 2024-08-13 | End: 2024-10-28

## 2024-08-14 ENCOUNTER — ROUTINE PRENATAL (OUTPATIENT)
Dept: OBGYN CLINIC | Facility: CLINIC | Age: 29
End: 2024-08-14
Payer: COMMERCIAL

## 2024-08-14 VITALS
HEART RATE: 100 BPM | OXYGEN SATURATION: 99 % | BODY MASS INDEX: 34.61 KG/M2 | WEIGHT: 208 LBS | SYSTOLIC BLOOD PRESSURE: 120 MMHG | DIASTOLIC BLOOD PRESSURE: 74 MMHG

## 2024-08-14 DIAGNOSIS — O36.62X0: ICD-10-CM

## 2024-08-14 DIAGNOSIS — Z34.82 PRENATAL CARE, SUBSEQUENT PREGNANCY, SECOND TRIMESTER: Primary | ICD-10-CM

## 2024-08-14 DIAGNOSIS — Z3A.32 32 WEEKS GESTATION OF PREGNANCY: ICD-10-CM

## 2024-08-14 DIAGNOSIS — E66.01 SEVERE OBESITY DUE TO EXCESS CALORIES AFFECTING PREGNANCY IN THIRD TRIMESTER (HCC): ICD-10-CM

## 2024-08-14 DIAGNOSIS — O24.410 DIET CONTROLLED GESTATIONAL DIABETES MELLITUS (GDM) IN THIRD TRIMESTER: ICD-10-CM

## 2024-08-14 DIAGNOSIS — O99.213 SEVERE OBESITY DUE TO EXCESS CALORIES AFFECTING PREGNANCY IN THIRD TRIMESTER (HCC): ICD-10-CM

## 2024-08-14 LAB
SL AMB  POCT GLUCOSE, UA: NORMAL
SL AMB POCT URINE PROTEIN: NORMAL

## 2024-08-14 PROCEDURE — 81002 URINALYSIS NONAUTO W/O SCOPE: CPT | Performed by: STUDENT IN AN ORGANIZED HEALTH CARE EDUCATION/TRAINING PROGRAM

## 2024-08-14 PROCEDURE — PNV: Performed by: STUDENT IN AN ORGANIZED HEALTH CARE EDUCATION/TRAINING PROGRAM

## 2024-08-14 NOTE — ASSESSMENT & PLAN NOTE
30yo  at 32.2wks presents for routine prenatal visit     Pt denies contractions, vaginal bleeding, leakage of fluid. Endorses fetal movement.    -continue PNVs   -delivery consent signed, has pediatrician, not planning to breastfeed  - labor precautions provided  -return in 2 weeks

## 2024-08-14 NOTE — PROGRESS NOTES
"32 weeks gestation of pregnancy  30yo  at 32.2wks presents for routine prenatal visit     Pt denies contractions, vaginal bleeding, leakage of fluid. Endorses fetal movement.    -continue PNVs   -delivery consent signed, has pediatrician, not planning to breastfeed  - labor precautions provided  -return in 2 weeks     Macrosomia affecting management of mother, second trimester, not applicable or unspecified fetus  -32.0wks 2265 grams - 4 lbs 15 oz (89%)   -no further US needed    Severe obesity due to excess calories affecting pregnancy in third trimester (HCC)  -continue ASA    Diet controlled gestational diabetes mellitus (GDM) in third trimester  -well controlled per pt, follows with MFM   No results found for: \"HGBA1C\"    "

## 2024-08-15 ENCOUNTER — PATIENT MESSAGE (OUTPATIENT)
Dept: OBGYN CLINIC | Facility: CLINIC | Age: 29
End: 2024-08-15

## 2024-08-21 ENCOUNTER — HOSPITAL ENCOUNTER (OUTPATIENT)
Facility: HOSPITAL | Age: 29
Setting detail: OBSERVATION
Discharge: HOME | End: 2024-08-23
Attending: STUDENT IN AN ORGANIZED HEALTH CARE EDUCATION/TRAINING PROGRAM | Admitting: STUDENT IN AN ORGANIZED HEALTH CARE EDUCATION/TRAINING PROGRAM
Payer: COMMERCIAL

## 2024-08-21 ENCOUNTER — NURSE TRIAGE (OUTPATIENT)
Dept: OTHER | Facility: OTHER | Age: 29
End: 2024-08-21

## 2024-08-21 PROBLEM — O36.8190 DECREASED FETAL MOVEMENT: Status: ACTIVE | Noted: 2024-08-21
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
## 2024-08-21 LAB
ABO GROUP BLD: NORMAL
APTT PPP: 26 SECONDS (ref 23–34)
BLD GP AB SCN SERPL QL: NEGATIVE
ERYTHROCYTE [DISTWIDTH] IN BLOOD BY AUTOMATED COUNT: 14.2 % (ref 11.6–15.1)
FIBRINOGEN PPP-MCNC: 507 MG/DL (ref 206–523)
HCT VFR BLD AUTO: 33.4 % (ref 34.8–46.1)
HGB BLD-MCNC: 10.7 G/DL (ref 11.5–15.4)
INR PPP: 0.9 (ref 0.85–1.19)
MCH RBC QN AUTO: 24.3 PG (ref 26.8–34.3)
MCHC RBC AUTO-ENTMCNC: 32 G/DL (ref 31.4–37.4)
MCV RBC AUTO: 76 FL (ref 82–98)
PLATELET # BLD AUTO: 189 THOUSANDS/UL (ref 149–390)
PMV BLD AUTO: 9.2 FL (ref 8.9–12.7)
PROTHROMBIN TIME: 12.8 SECONDS (ref 12.3–15)
RBC # BLD AUTO: 4.41 MILLION/UL (ref 3.81–5.12)
RH BLD: POSITIVE
SPECIMEN EXPIRATION DATE: NORMAL
WBC # BLD AUTO: 11.02 THOUSAND/UL (ref 4.31–10.16)

## 2024-08-21 PROCEDURE — 85384 FIBRINOGEN ACTIVITY: CPT

## 2024-08-21 PROCEDURE — 85027 COMPLETE CBC AUTOMATED: CPT

## 2024-08-21 PROCEDURE — 86900 BLOOD TYPING SEROLOGIC ABO: CPT

## 2024-08-21 PROCEDURE — 86901 BLOOD TYPING SEROLOGIC RH(D): CPT

## 2024-08-21 PROCEDURE — 86780 TREPONEMA PALLIDUM: CPT

## 2024-08-21 PROCEDURE — 86850 RBC ANTIBODY SCREEN: CPT

## 2024-08-21 PROCEDURE — 85730 THROMBOPLASTIN TIME PARTIAL: CPT

## 2024-08-21 PROCEDURE — 85610 PROTHROMBIN TIME: CPT

## 2024-08-21 PROCEDURE — NC001 PR NO CHARGE: Performed by: STUDENT IN AN ORGANIZED HEALTH CARE EDUCATION/TRAINING PROGRAM

## 2024-08-21 RX ADMIN — SODIUM CHLORIDE, SODIUM LACTATE, POTASSIUM CHLORIDE, AND CALCIUM CHLORIDE 1000 ML: .6; .31; .03; .02 INJECTION, SOLUTION INTRAVENOUS at 21:04
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
## 2024-08-22 LAB
GLUCOSE SERPL-MCNC: 102 MG/DL (ref 65–140)
GLUCOSE SERPL-MCNC: 106 MG/DL (ref 65–140)
GLUCOSE SERPL-MCNC: 120 MG/DL (ref 65–140)
GLUCOSE SERPL-MCNC: 83 MG/DL (ref 65–140)
TREPONEMA PALLIDUM IGG+IGM AB [PRESENCE] IN SERUM OR PLASMA BY IMMUNOASSAY: NORMAL

## 2024-08-22 PROCEDURE — 99231 SBSQ HOSP IP/OBS SF/LOW 25: CPT | Performed by: STUDENT IN AN ORGANIZED HEALTH CARE EDUCATION/TRAINING PROGRAM

## 2024-08-22 PROCEDURE — NC001 PR NO CHARGE: Performed by: STUDENT IN AN ORGANIZED HEALTH CARE EDUCATION/TRAINING PROGRAM

## 2024-08-22 PROCEDURE — 82948 REAGENT STRIP/BLOOD GLUCOSE: CPT

## 2024-08-22 PROCEDURE — 76817 TRANSVAGINAL US OBSTETRIC: CPT

## 2024-08-22 PROCEDURE — 99215 OFFICE O/P EST HI 40 MIN: CPT

## 2024-08-22 PROCEDURE — 76815 OB US LIMITED FETUS(S): CPT

## 2024-08-22 RX ORDER — CALCIUM CARBONATE 500 MG/1
1000 TABLET, CHEWABLE ORAL 3 TIMES DAILY PRN
Status: DISCONTINUED | OUTPATIENT
Start: 2024-08-22 | End: 2024-08-23 | Stop reason: HOSPADM

## 2024-08-22 RX ADMIN — CALCIUM CARBONATE (ANTACID) CHEW TAB 500 MG 1000 MG: 500 CHEW TAB at 21:04

## 2024-08-22 RX ADMIN — ASPIRIN 162 MG: 81 TABLET, COATED ORAL at 08:41
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
## 2024-08-22 NOTE — ASSESSMENT & PLAN NOTE
Endorsed return of fetal movement on admission, normal fetal movement today    JANET 14.82 cm on admission   Fetal monitoring reactive

## 2024-08-22 NOTE — PLAN OF CARE
Problem: ANTEPARTUM  Goal: Maintain pregnancy as long as maternal and/or fetal condition is stable  Description: INTERVENTIONS:  - Maternal surveillance  - Fetal surveillance  - Monitor uterine activity  - Medications as ordered  - Bedrest  Outcome: Progressing     Problem: PAIN - ADULT  Goal: Verbalizes/displays adequate comfort level or baseline comfort level  Description: Interventions:  - Encourage patient to monitor pain and request assistance  - Assess pain using appropriate pain scale  - Administer analgesics based on type and severity of pain and evaluate response  - Implement non-pharmacological measures as appropriate and evaluate response  - Consider cultural and social influences on pain and pain management  - Notify physician/advanced practitioner if interventions unsuccessful or patient reports new pain  Outcome: Progressing     Problem: INFECTION - ADULT  Goal: Absence or prevention of progression during hospitalization  Description: INTERVENTIONS:  - Assess and monitor for signs and symptoms of infection  - Monitor lab/diagnostic results  - Monitor all insertion sites, i.e. indwelling lines, tubes, and drains  - Monitor endotracheal if appropriate and nasal secretions for changes in amount and color  - [place] appropriate cooling/warming therapies per order  - Administer medications as ordered  - Instruct and encourage patient and family to use good hand hygiene technique  - Identify and instruct in appropriate isolation precautions for identified infection/condition  Outcome: Progressing  Goal: Absence of fever/infection during neutropenic period  Description: INTERVENTIONS:  - Monitor WBC    Outcome: Progressing     Problem: SAFETY ADULT  Goal: Patient will remain free of falls  Description: INTERVENTIONS:  - Educate patient/family on patient safety including physical limitations  - Instruct patient to call for assistance with activity   - Consult OT/PT to assist with strengthening/mobility   -  Keep Call bell within reach  - Keep bed low and locked with side rails adjusted as appropriate  - Keep care items and personal belongings within reach  - Initiate and maintain comfort rounds  Outcome: Progressing  Goal: Maintain or return to baseline ADL function  Description: INTERVENTIONS:  -  Assess patient's ability to carry out ADLs; assess patient's baseline for ADL function and identify physical deficits which impact ability to perform ADLs (bathing, care of mouth/teeth, toileting, grooming, dressing, etc.)  - Assess/evaluate cause of self-care deficits   - Assess range of motion  - Assess patient's mobility; develop plan if impaired  - Assess patient's need for assistive devices and provide as appropriate  - Encourage maximum independence but intervene and supervise when necessary  - Involve family in performance of ADLs  - Assess for home care needs following discharge   - Consider OT consult to assist with ADL evaluation and planning for discharge  - Provide patient education as appropriate  Outcome: Progressing  Goal: Maintains/Returns to pre admission functional level  Description: INTERVENTIONS:  - Perform AM-PAC 6 Click Basic Mobility/ Daily Activity assessment daily.  - Set and communicate daily mobility goal to care team and patient/family/caregiver.   - Out of bed for toileting  - Record patient progress and toleration of activity level   Outcome: Progressing     Problem: Knowledge Deficit  Goal: Patient/family/caregiver demonstrates understanding of disease process, treatment plan, medications, and discharge instructions  Description: Complete learning assessment and assess knowledge base.  Interventions:  - Provide teaching at level of understanding  - Provide teaching via preferred learning methods  Outcome: Progressing     Problem: DISCHARGE PLANNING  Goal: Discharge to home or other facility with appropriate resources  Description: INTERVENTIONS:  - Identify barriers to discharge w/patient and  caregiver  - Arrange for needed discharge resources and transportation as appropriate  - Identify discharge learning needs (meds, wound care, etc.)  - Arrange for interpretive services to assist at discharge as needed  - Refer to Case Management Department for coordinating discharge planning if the patient needs post-hospital services based on physician/advanced practitioner order or complex needs related to functional status, cognitive ability, or social support system  Outcome: Progressing
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
## 2024-08-22 NOTE — ASSESSMENT & PLAN NOTE
Denies direct trauma to abdomen/vaginal bleeding   labor workup neg. Cervical length 3.90 cm, microscopy neg, no active bleeding on spec exam, SVE 2/50/-3  Rh positive  Coags/CBC wnl  Alecia Q2 mins on presentation, reports contraction pain has resolved this AM

## 2024-08-22 NOTE — PLAN OF CARE
Problem: ANTEPARTUM  Goal: Maintain pregnancy as long as maternal and/or fetal condition is stable  Description: INTERVENTIONS:  - Maternal surveillance  - Fetal surveillance  - Monitor uterine activity  - Medications as ordered  - Bedrest  Outcome: Progressing     Problem: PAIN - ADULT  Goal: Verbalizes/displays adequate comfort level or baseline comfort level  Description: Interventions:  - Encourage patient to monitor pain and request assistance  - Assess pain using appropriate pain scale  - Administer analgesics based on type and severity of pain and evaluate response  - Implement non-pharmacological measures as appropriate and evaluate response  - Consider cultural and social influences on pain and pain management  - Notify physician/advanced practitioner if interventions unsuccessful or patient reports new pain  Outcome: Progressing     Problem: INFECTION - ADULT  Goal: Absence or prevention of progression during hospitalization  Description: INTERVENTIONS:  - Assess and monitor for signs and symptoms of infection  - Monitor lab/diagnostic results  - Monitor all insertion sites, i.e. indwelling lines, tubes, and drains  - Monitor endotracheal if appropriate and nasal secretions for changes in amount and color  - [place] appropriate cooling/warming therapies per order  - Administer medications as ordered  - Instruct and encourage patient and family to use good hand hygiene technique  - Identify and instruct in appropriate isolation precautions for identified infection/condition  Outcome: Progressing  Goal: Absence of fever/infection during neutropenic period  Description: INTERVENTIONS:  - Monitor WBC    Outcome: Progressing     Problem: SAFETY ADULT  Goal: Patient will remain free of falls  Description: INTERVENTIONS:  - Educate patient/family on patient safety including physical limitations  - Instruct patient to call for assistance with activity   - Consult OT/PT to assist with strengthening/mobility   -  Keep Call bell within reach  - Keep bed low and locked with side rails adjusted as appropriate  - Keep care items and personal belongings within reach  - Initiate and maintain comfort rounds  - Make Fall Risk Sign visible to staff    - Apply yellow socks and bracelet for high fall risk patients  - Consider moving patient to room near nurses station  Outcome: Progressing  Goal: Maintain or return to baseline ADL function  Description: INTERVENTIONS:  -  Assess patient's ability to carry out ADLs; assess patient's baseline for ADL function and identify physical deficits which impact ability to perform ADLs (bathing, care of mouth/teeth, toileting, grooming, dressing, etc.)  - Assess/evaluate cause of self-care deficits   - Assess range of motion  - Assess patient's mobility; develop plan if impaired  - Assess patient's need for assistive devices and provide as appropriate  - Encourage maximum independence but intervene and supervise when necessary  - Involve family in performance of ADLs  - Assess for home care needs following discharge   - Consider OT consult to assist with ADL evaluation and planning for discharge  - Provide patient education as appropriate  Outcome: Progressing  Goal: Maintains/Returns to pre admission functional level  Description: INTERVENTIONS:  - Perform AM-PAC 6 Click Basic Mobility/ Daily Activity assessment daily.  - Set and communicate daily mobility goal to care team and patient/family/caregiver.   - Collaborate with rehabilitation services on mobility goals if consulted    - Out of bed for toileting  - Record patient progress and toleration of activity level   Outcome: Progressing     Problem: Knowledge Deficit  Goal: Patient/family/caregiver demonstrates understanding of disease process, treatment plan, medications, and discharge instructions  Description: Complete learning assessment and assess knowledge base.  Interventions:  - Provide teaching at level of understanding  - Provide  teaching via preferred learning methods  Outcome: Progressing     Problem: DISCHARGE PLANNING  Goal: Discharge to home or other facility with appropriate resources  Description: INTERVENTIONS:  - Identify barriers to discharge w/patient and caregiver  - Arrange for needed discharge resources and transportation as appropriate  - Identify discharge learning needs (meds, wound care, etc.)  - Arrange for interpretive services to assist at discharge as needed  - Refer to Case Management Department for coordinating discharge planning if the patient needs post-hospital services based on physician/advanced practitioner order or complex needs related to functional status, cognitive ability, or social support system  Outcome: Progressing
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
## 2024-08-22 NOTE — PLAN OF CARE
Problem: ANTEPARTUM  Goal: Maintain pregnancy as long as maternal and/or fetal condition is stable  Description: INTERVENTIONS:  - Maternal surveillance  - Fetal surveillance  - Monitor uterine activity  - Medications as ordered  - Bedrest  Outcome: Progressing     Problem: PAIN - ADULT  Goal: Verbalizes/displays adequate comfort level or baseline comfort level  Description: Interventions:  - Encourage patient to monitor pain and request assistance  - Assess pain using appropriate pain scale  - Administer analgesics based on type and severity of pain and evaluate response  - Implement non-pharmacological measures as appropriate and evaluate response  - Consider cultural and social influences on pain and pain management  - Notify physician/advanced practitioner if interventions unsuccessful or patient reports new pain  Outcome: Progressing     Problem: INFECTION - ADULT  Goal: Absence or prevention of progression during hospitalization  Description: INTERVENTIONS:  - Assess and monitor for signs and symptoms of infection  - Monitor lab/diagnostic results  - Monitor all insertion sites, i.e. indwelling lines, tubes, and drains  - Monitor endotracheal if appropriate and nasal secretions for changes in amount and color  - [place] appropriate cooling/warming therapies per order  - Administer medications as ordered  - Instruct and encourage patient and family to use good hand hygiene technique  - Identify and instruct in appropriate isolation precautions for identified infection/condition  Outcome: Progressing  Goal: Absence of fever/infection during neutropenic period  Description: INTERVENTIONS:  - Monitor WBC    Outcome: Progressing     Problem: SAFETY ADULT  Goal: Patient will remain free of falls  Description: INTERVENTIONS:  - Educate patient/family on patient safety including physical limitations  - Instruct patient to call for assistance with activity   - Consult OT/PT to assist with strengthening/mobility   -  Keep Call bell within reach  - Keep bed low and locked with side rails adjusted as appropriate  - Keep care items and personal belongings within reach  - Initiate and maintain comfort rounds  Outcome: Progressing  Goal: Maintain or return to baseline ADL function  Description: INTERVENTIONS:  -  Assess patient's ability to carry out ADLs; assess patient's baseline for ADL function and identify physical deficits which impact ability to perform ADLs (bathing, care of mouth/teeth, toileting, grooming, dressing, etc.)  - Assess/evaluate cause of self-care deficits   - Assess range of motion  - Assess patient's mobility; develop plan if impaired  - Assess patient's need for assistive devices and provide as appropriate  - Encourage maximum independence but intervene and supervise when necessary  - Involve family in performance of ADLs  - Assess for home care needs following discharge   - Consider OT consult to assist with ADL evaluation and planning for discharge  - Provide patient education as appropriate  Outcome: Progressing  Goal: Maintains/Returns to pre admission functional level  Description: INTERVENTIONS:  - Perform AM-PAC 6 Click Basic Mobility/ Daily Activity assessment daily.  - Set and communicate daily mobility goal to care team and patient/family/caregiver.   - Out of bed for toileting  - Record patient progress and toleration of activity level   Outcome: Progressing     Problem: Knowledge Deficit  Goal: Patient/family/caregiver demonstrates understanding of disease process, treatment plan, medications, and discharge instructions  Description: Complete learning assessment and assess knowledge base.  Interventions:  - Provide teaching at level of understanding  - Provide teaching via preferred learning methods  Outcome: Progressing     Problem: DISCHARGE PLANNING  Goal: Discharge to home or other facility with appropriate resources  Description: INTERVENTIONS:  - Identify barriers to discharge w/patient and  caregiver  - Arrange for needed discharge resources and transportation as appropriate  - Identify discharge learning needs (meds, wound care, etc.)  - Arrange for interpretive services to assist at discharge as needed  - Refer to Case Management Department for coordinating discharge planning if the patient needs post-hospital services based on physician/advanced practitioner order or complex needs related to functional status, cognitive ability, or social support system  Outcome: Progressing

## 2024-08-23 VITALS
SYSTOLIC BLOOD PRESSURE: 117 MMHG | HEIGHT: 65 IN | OXYGEN SATURATION: 98 % | WEIGHT: 208 LBS | RESPIRATION RATE: 18 BRPM | HEART RATE: 113 BPM | DIASTOLIC BLOOD PRESSURE: 73 MMHG | TEMPERATURE: 98 F | BODY MASS INDEX: 34.66 KG/M2

## 2024-08-23 PROBLEM — R00.0 TACHYCARDIA: Status: ACTIVE | Noted: 2024-08-23

## 2024-08-23 LAB
ATRIAL RATE: 119 BPM
GLUCOSE SERPL-MCNC: 87 MG/DL (ref 65–140)
GLUCOSE SERPL-MCNC: 95 MG/DL (ref 65–140)
P AXIS: 47 DEGREES
PR INTERVAL: 132 MS
QRS AXIS: 92 DEGREES
QRSD INTERVAL: 80 MS
QT INTERVAL: 328 MS
QTC INTERVAL: 461 MS
T WAVE AXIS: 15 DEGREES
VENTRICULAR RATE: 119 BPM

## 2024-08-23 PROCEDURE — NC001 PR NO CHARGE: Performed by: STUDENT IN AN ORGANIZED HEALTH CARE EDUCATION/TRAINING PROGRAM

## 2024-08-23 PROCEDURE — 99238 HOSP IP/OBS DSCHRG MGMT 30/<: CPT | Performed by: STUDENT IN AN ORGANIZED HEALTH CARE EDUCATION/TRAINING PROGRAM

## 2024-08-23 PROCEDURE — 93010 ELECTROCARDIOGRAM REPORT: CPT | Performed by: INTERNAL MEDICINE

## 2024-08-23 PROCEDURE — 93005 ELECTROCARDIOGRAM TRACING: CPT

## 2024-08-23 PROCEDURE — 82948 REAGENT STRIP/BLOOD GLUCOSE: CPT

## 2024-08-23 RX ADMIN — ASPIRIN 162 MG: 81 TABLET, COATED ORAL at 08:46
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
## 2024-08-23 NOTE — PLAN OF CARE
Problem: ANTEPARTUM  Goal: Maintain pregnancy as long as maternal and/or fetal condition is stable  Description: INTERVENTIONS:  - Maternal surveillance  - Fetal surveillance  - Monitor uterine activity  - Medications as ordered  - Bedrest  Outcome: Progressing     Problem: PAIN - ADULT  Goal: Verbalizes/displays adequate comfort level or baseline comfort level  Description: Interventions:  - Encourage patient to monitor pain and request assistance  - Assess pain using appropriate pain scale  - Administer analgesics based on type and severity of pain and evaluate response  - Implement non-pharmacological measures as appropriate and evaluate response  - Consider cultural and social influences on pain and pain management  - Notify physician/advanced practitioner if interventions unsuccessful or patient reports new pain  Outcome: Progressing     Problem: INFECTION - ADULT  Goal: Absence or prevention of progression during hospitalization  Description: INTERVENTIONS:  - Assess and monitor for signs and symptoms of infection  - Monitor lab/diagnostic results  - Monitor all insertion sites, i.e. indwelling lines, tubes, and drains  - Monitor endotracheal if appropriate and nasal secretions for changes in amount and color  - [place] appropriate cooling/warming therapies per order  - Administer medications as ordered  - Instruct and encourage patient and family to use good hand hygiene technique  - Identify and instruct in appropriate isolation precautions for identified infection/condition  Outcome: Progressing     Problem: Knowledge Deficit  Goal: Patient/family/caregiver demonstrates understanding of disease process, treatment plan, medications, and discharge instructions  Description: Complete learning assessment and assess knowledge base.  Interventions:  - Provide teaching at level of understanding  - Provide teaching via preferred learning methods  Outcome: Progressing     Problem: DISCHARGE PLANNING  Goal:  Discharge to home or other facility with appropriate resources  Description: INTERVENTIONS:  - Identify barriers to discharge w/patient and caregiver  - Arrange for needed discharge resources and transportation as appropriate  - Identify discharge learning needs (meds, wound care, etc.)  - Arrange for interpretive services to assist at discharge as needed  - Refer to Case Management Department for coordinating discharge planning if the patient needs post-hospital services based on physician/advanced practitioner order or complex needs related to functional status, cognitive ability, or social support system  Outcome: Progressing

## 2024-08-24 ENCOUNTER — NURSE TRIAGE (OUTPATIENT)
Dept: OTHER | Facility: OTHER | Age: 29
End: 2024-08-24

## 2024-08-24 ENCOUNTER — DOCUMENTATION (OUTPATIENT)
Dept: LABOR AND DELIVERY | Facility: HOSPITAL | Age: 29
End: 2024-08-24

## 2024-08-24 DIAGNOSIS — G44.319 ACUTE POST-TRAUMATIC HEADACHE, NOT INTRACTABLE: Primary | ICD-10-CM

## 2024-08-24 DIAGNOSIS — Z3A.12 12 WEEKS GESTATION OF PREGNANCY: ICD-10-CM

## 2024-08-24 DIAGNOSIS — O99.211 OBESITY AFFECTING PREGNANCY IN FIRST TRIMESTER, UNSPECIFIED OBESITY TYPE: ICD-10-CM

## 2024-08-24 DIAGNOSIS — Z36.82 ENCOUNTER FOR (NT) NUCHAL TRANSLUCENCY SCAN: ICD-10-CM

## 2024-08-24 RX ORDER — CYCLOBENZAPRINE HCL 10 MG
10 TABLET ORAL 3 TIMES DAILY PRN
Qty: 12 TABLET | Refills: 0 | Status: SHIPPED | OUTPATIENT
Start: 2024-08-24 | End: 2024-08-31
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
## 2024-08-26 ENCOUNTER — DOCUMENTATION (OUTPATIENT)
Dept: PERINATAL CARE | Facility: CLINIC | Age: 29
End: 2024-08-26

## 2024-08-26 NOTE — PROGRESS NOTES
"  Date: 08/26/24  Marci Huber  1995  Estimated Date of Delivery: 10/7/24  34w0d  OB/GYN:LEXY    Diagnosis:  Diet controlled GDM    Blood Sugar Logs Submitted via: Glucose Flowsheet            Assessment and Plan:  No diabetes related medications  2000 calorie (CHO:45-15-60-15-60-30) (PRO: 2/3-1-3/4-1-3/4-2) meal plan consisting of 3 meals and 3 snacks daily including protein at each  Advised patient to continue current meal plan  Avoid fasting for > 10 hours overnight  Continue SMBG 4 x per day (Fasting, 2 hour after start of each meal) with a One Touch Verio glucose meter  Walks daily    Lab Work:   No results found for: \"HGBA1C\"     Ultrasound:       Date:8/12/24       Fetal Growth:  AC-94% & EFW-89%       JANET: Normal  Next: none    Diabetes Self Management Support Plan outside of ongoing care: Spouse/Family   Patient Stated Goal: \"I will walk 20-30 minutes after dinner daily\"   Goal Assessment: On track    Date to report next: Weekly     Avani Larsen, MS, RD, [facility]  Diabetes Educator  [facility]aternal [facility]etal [facility]  Diabetes in Pregnancy Program  [address], [address]  [place] [address]    "

## 2024-08-28 ENCOUNTER — ROUTINE PRENATAL (OUTPATIENT)
Dept: OBGYN CLINIC | Facility: CLINIC | Age: 29
End: 2024-08-28
Payer: COMMERCIAL

## 2024-08-28 VITALS
SYSTOLIC BLOOD PRESSURE: 116 MMHG | HEART RATE: 99 BPM | BODY MASS INDEX: 34.08 KG/M2 | WEIGHT: 204.8 LBS | OXYGEN SATURATION: 98 % | DIASTOLIC BLOOD PRESSURE: 66 MMHG

## 2024-08-28 DIAGNOSIS — O99.213 SEVERE OBESITY DUE TO EXCESS CALORIES AFFECTING PREGNANCY IN THIRD TRIMESTER (HCC): ICD-10-CM

## 2024-08-28 DIAGNOSIS — E66.01 SEVERE OBESITY DUE TO EXCESS CALORIES AFFECTING PREGNANCY IN THIRD TRIMESTER (HCC): ICD-10-CM

## 2024-08-28 DIAGNOSIS — O24.410 DIET CONTROLLED GESTATIONAL DIABETES MELLITUS (GDM) IN THIRD TRIMESTER: ICD-10-CM
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
## 2024-08-28 DIAGNOSIS — Z34.82 PRENATAL CARE, SUBSEQUENT PREGNANCY, SECOND TRIMESTER: Primary | ICD-10-CM

## 2024-08-28 DIAGNOSIS — Z3A.34 34 WEEKS GESTATION OF PREGNANCY: ICD-10-CM

## 2024-08-28 LAB
SL AMB  POCT GLUCOSE, UA: NORMAL
SL AMB POCT URINE PROTEIN: NORMAL

## 2024-08-28 PROCEDURE — 81002 URINALYSIS NONAUTO W/O SCOPE: CPT | Performed by: STUDENT IN AN ORGANIZED HEALTH CARE EDUCATION/TRAINING PROGRAM

## 2024-08-28 PROCEDURE — PNV: Performed by: STUDENT IN AN ORGANIZED HEALTH CARE EDUCATION/TRAINING PROGRAM

## 2024-08-28 NOTE — ASSESSMENT & PLAN NOTE
"-continue follow up with MFM, glucose levels well controlled per pt   No results found for: \"HGBA1C\"  "

## 2024-08-28 NOTE — ASSESSMENT & PLAN NOTE
30yo  at 34.2wks presents for routine prenatal visit     Pt denies contractions, vaginal bleeding, leakage of fluid. Endorses fetal movement.    -continue PNVs   -birth plan signed today   -s/p tdap   - labor precautions provided  -return in 2 weeks
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
## 2024-09-11 ENCOUNTER — ROUTINE PRENATAL (OUTPATIENT)
Dept: OBGYN CLINIC | Facility: CLINIC | Age: 29
End: 2024-09-11
Payer: COMMERCIAL

## 2024-09-11 VITALS — DIASTOLIC BLOOD PRESSURE: 82 MMHG | SYSTOLIC BLOOD PRESSURE: 120 MMHG | WEIGHT: 208.2 LBS | BODY MASS INDEX: 34.65 KG/M2

## 2024-09-11 DIAGNOSIS — Z34.83 PRENATAL CARE, SUBSEQUENT PREGNANCY IN THIRD TRIMESTER: Primary | ICD-10-CM

## 2024-09-11 DIAGNOSIS — Z3A.36 36 WEEKS GESTATION OF PREGNANCY: ICD-10-CM

## 2024-09-11 DIAGNOSIS — O09.899 SHORT INTERVAL BETWEEN PREGNANCIES AFFECTING PREGNANCY, ANTEPARTUM: ICD-10-CM

## 2024-09-11 DIAGNOSIS — E66.01 SEVERE OBESITY DUE TO EXCESS CALORIES AFFECTING PREGNANCY IN THIRD TRIMESTER (HCC): ICD-10-CM

## 2024-09-11 DIAGNOSIS — O24.410 DIET CONTROLLED GESTATIONAL DIABETES MELLITUS (GDM) IN THIRD TRIMESTER: ICD-10-CM

## 2024-09-11 DIAGNOSIS — O99.213 SEVERE OBESITY DUE TO EXCESS CALORIES AFFECTING PREGNANCY IN THIRD TRIMESTER (HCC): ICD-10-CM

## 2024-09-11 LAB
SL AMB  POCT GLUCOSE, UA: NEGATIVE
SL AMB POCT URINE PROTEIN: NEGATIVE

## 2024-09-11 PROCEDURE — PNV: Performed by: PHYSICIAN ASSISTANT

## 2024-09-11 PROCEDURE — 81002 URINALYSIS NONAUTO W/O SCOPE: CPT | Performed by: PHYSICIAN ASSISTANT

## 2024-09-11 PROCEDURE — 87150 DNA/RNA AMPLIFIED PROBE: CPT | Performed by: PHYSICIAN ASSISTANT

## 2024-09-11 NOTE — PROGRESS NOTES
OB- 36w 2d  LOF-no  VB-no  CTX-a lot of BH contractions but feels like some are more real and lower. Having tightening of her belly as well.   FM-some decrease in movement but unable to do FKC today.    28 wk labs completed   S/p Tdap vaccine 07/26/2024  Delivery consent signed on 07/17/2024  Breast pump not needed (bottle feeding)  Has a pediatrician  GBS collected today-NKDA.  Sugars have been higher after eating but has been discussing with her dietician already.   Wants to schedule IOL but not sure at what weeks it should be.

## 2024-09-11 NOTE — ASSESSMENT & PLAN NOTE
Marci  is a 29 y.o.  @36w2d who presents for routine prenatal visit.      Reports fetal movement. Has not yet conducted FKC today. Admits movements today feel more subtle. Reports great fetal movement during visit today. Advised to go home and conduct FKC. If not meeting FKC have low threshold to call and report to [place] for fetal monitoring    Growth scan- EFW89% at 32 wks  GBS collected today  .  Denies LOF, vaginal bleeding, regular uterine contractions, cramping, headaches or visual changes.      Reviewed PTL/Labor precautions and FKC.

## 2024-09-11 NOTE — ASSESSMENT & PLAN NOTE
"Continues to report blood sugars to DP  Discussed IOL timing  She is interested in 39 wk IOL  Request sent today  Consents signed  No results found for: \"HGBA1C\"  "

## 2024-09-11 NOTE — PROGRESS NOTES
"Diet controlled gestational diabetes mellitus (GDM) in third trimester  Continues to report blood sugars to DP  Discussed IOL timing  She is interested in 39 wk IOL  Request sent today  Consents signed  No results found for: \"HGBA1C\"    Short interval between pregnancies affecting pregnancy, antepartum  EFW89% at 32 wks    Severe obesity due to excess calories affecting pregnancy in third trimester (HCC)  Third trimester growth US complete  Stopped ASA   Normotensive today  TWG8#    36 weeks gestation of pregnancy  Marci  is a 29 y.o.  @36w2d who presents for routine prenatal visit.      Reports fetal movement. Has not yet conducted FKC today. Admits movements today feel more subtle. Reports great fetal movement during visit today. Advised to go home and conduct FKC. If not meeting FKC have low threshold to call and report to [place] for fetal monitoring    Growth scan- EFW89% at 32 wks  GBS collected today  .  Denies LOF, vaginal bleeding, regular uterine contractions, cramping, headaches or visual changes.      Reviewed PTL/Labor precautions and FKC.        "

## 2024-09-12 ENCOUNTER — TELEPHONE (OUTPATIENT)
Dept: OBGYN CLINIC | Facility: CLINIC | Age: 29
End: 2024-09-12

## 2024-09-12 NOTE — TELEPHONE ENCOUNTER
"Confirmed with patient req is 9/30 \"the earlier the better\" - will attempt scheduling for 9/30 per patient preference.   "

## 2024-09-12 NOTE — TELEPHONE ENCOUNTER
9/30 7am KEVIN    Patient notified of IOL date,time,and location. Advised patient she may eat a light breakfast/dinner prior to going to L&D. In the interim please report any vaginal bleeding,leakage of fluid,decreased fetal movement or contractions.Reviewed fetal kick counts. Advised to keep all upcoming prenatal visits.

## 2024-09-12 NOTE — TELEPHONE ENCOUNTER
----- Message from Darlene Cao PA-C sent at 9/11/2024  4:45 PM EDT -----  Regarding: IOL  Procedure to be scheduled (IOL or CS): IOL  SVITLANA: Estimated Date of Delivery: 10/7/24  Indication for delivery: medical IOL for GDM -diet controlled  Requested date (s) of delivery: 39th week    If requested date is unavailable, is there a date by which the pt must be delivered? 40w6d  Physician preference: n/a    If IOL, anticipated method: without ripening  If CS, with or without tubal: n/a

## 2024-09-13 ENCOUNTER — NURSE TRIAGE (OUTPATIENT)
Age: 29
End: 2024-09-13

## 2024-09-13 ENCOUNTER — HOSPITAL ENCOUNTER (OUTPATIENT)
Facility: HOSPITAL | Age: 29
Discharge: HOME | End: 2024-09-13
Attending: OBSTETRICS & GYNECOLOGY | Admitting: OBSTETRICS & GYNECOLOGY
Payer: COMMERCIAL

## 2024-09-13 VITALS
WEIGHT: 208.2 LBS | RESPIRATION RATE: 18 BRPM | HEIGHT: 65 IN | SYSTOLIC BLOOD PRESSURE: 120 MMHG | BODY MASS INDEX: 34.69 KG/M2 | OXYGEN SATURATION: 98 % | TEMPERATURE: 98.3 F | HEART RATE: 112 BPM | DIASTOLIC BLOOD PRESSURE: 75 MMHG

## 2024-09-13 LAB — GP B STREP DNA SPEC QL NAA+PROBE: NEGATIVE

## 2024-09-13 PROCEDURE — 99213 OFFICE O/P EST LOW 20 MIN: CPT

## 2024-09-13 PROCEDURE — 76815 OB US LIMITED FETUS(S): CPT

## 2024-09-13 PROCEDURE — NC001 PR NO CHARGE: Performed by: OBSTETRICS & GYNECOLOGY

## 2024-09-13 NOTE — TELEPHONE ENCOUNTER
"Spoke with patient who is 36w4d, .  She reports decreased fetal movement for the last 12 hours.  She reports contractions, inconsistent yesterday and none today.  She reports baby did meet kick counts yesterday with some coaxing.  She reports he is usually very active, she knows his patterns and this seems very abnormal.  She denies any abd pain, vaginal bleeding/LOF or contractions.  She was advised to go to L&D for further evaluation.      SEC to on call provider and L&D Charge TACHO Perry.      Reason for Disposition   Pregnant 23 or more weeks and mother thinks baby is moving less today (e.g., even if kick count is normal or not performed)  (Exception: Mother was distracted by other activities.)    Answer Assessment - Initial Assessment Questions  1. FETAL MOVEMENT: \"Has the baby's movement decreased or changed significantly from normal?\" (e.g., yes, no; describe) \"When was the last time you felt the baby move?\" (e.g., minutes, hours)      Yes, did make kick counts, but has been decreased overall for the last 12 hours.   2. SVITLANA: \"What date are you expecting to deliver?\"       10/7/24  3. PREGNANCY: \"How many weeks pregnant are you?\"       36w4d  4. OTHER SYMPTOMS: \"Do you have any other symptoms?\" (e.g., abdominal pain, fever, leaking fluid from vagina, vaginal bleeding, widespread itching, etc.)      Contractions all day yesterday, but inconsistent.    Protocols used: Pregnancy - Decreased or Abnormal Fetal Movement-ADULT-[place]    "

## 2024-09-13 NOTE — PROCEDURES
Marci Collierabrese, a  at 36w4d with an SVITLANA of 10/7/2024, by Ultrasound, was seen at [facility] for the following procedure(s): $Procedure Type: JANET]         4 Quadrant JANET  JANET Q1 (cm): 6.9 cm  JANET Q2 (cm): 4.4 cm  JANET Q3 (cm): 7.5 cm  JANET Q4 (cm): 2.4 cm  JANET TOTAL (cm): 21.2 cm  LVP (cm): 7.5 cm                   Karly Tamez MD  PGY 2, Obstetrics and Gynecology  2024  10:54 AM

## 2024-09-13 NOTE — DISCHARGE INSTRUCTIONS
Pregnancy at 35 to 38 Weeks   WHAT YOU NEED TO KNOW:   What changes are happening with my body?  You are considered full term at the beginning of 37 weeks. Your breathing may be easier if your baby has moved down into a head-down position. You may need to urinate more often because the baby may be pressing on your bladder. You may also feel more discomfort and get tired easily.  How do I care for myself at this stage of my pregnancy?       Eat a variety of healthy foods.  Healthy foods include fruits, vegetables, whole-grain breads, low-fat dairy foods, beans, lean meats, and fish. Drink liquids as directed. Ask how much liquid to drink each day and which liquids are best for you. Limit caffeine to less than 200 milligrams each day. Limit your intake of fish to 2 servings each week. Choose fish low in mercury such as canned light tuna, shrimp, salmon, cod, or tilapia. Do not  eat fish high in mercury such as swordfish, tilefish, choi mackerel, and shark.         Take prenatal vitamins as directed.  Your need for certain vitamins and minerals, such as folic acid, increases during pregnancy. Prenatal vitamins provide some of the extra vitamins and minerals you need. Prenatal vitamins may also help to decrease the risk of certain birth defects.         Rest as needed.  Put your feet up if you have swelling in your ankles and feet.         Talk to your healthcare provider about exercise.  Moderate exercise can help you stay fit. Your healthcare provider will help you plan an exercise program that is safe for you during pregnancy.         Do not smoke.  Smoking increases your risk of a miscarriage and other health problems during your pregnancy. Smoking can cause your baby to be born early or weigh less at birth. Ask your healthcare provider for information if you need help quitting.    Do not drink alcohol.  Alcohol passes from your body to your baby through the placenta. It can affect your baby's brain development and  cause fetal alcohol syndrome (FAS). FAS is a group of conditions that causes mental, behavior, and growth problems.    Talk to your healthcare provider before you take any medicines.  Many medicines may harm your baby if you take them when you are pregnant. Do not take any medicines, vitamins, herbs, or supplements without first talking to your healthcare provider. Never use illegal or street drugs (such as marijuana or cocaine) while you are pregnant.    What are some safety tips during pregnancy?   Avoid hot tubs and saunas.  Do not use a hot tub or sauna while you are pregnant, especially during your first trimester. Hot tubs and saunas may raise your baby's temperature and increase the risk of birth defects.    Avoid toxoplasmosis.  This is an infection caused by eating raw meat or being around infected cat feces. It can cause birth defects, miscarriages, and other problems. Wash your hands after you touch raw meat. Make sure any meat is well-cooked before you eat it. Avoid raw eggs and unpasteurized milk. Use gloves or ask someone else to clean your cat's litter box while you are pregnant.         Ask your healthcare provider about travel.  The most comfortable time to travel is during the second trimester. Ask your provider if you can travel after 36 weeks. You may not be able to travel in an airplane after 36 weeks. He or she may also recommend you avoid long road trips.    What changes are happening with my baby?  By 38 weeks, your baby may weigh between 6 and 9 pounds. Your baby may be about 14 inches long from the top of the head to the rump (baby's bottom). Your baby hears well enough to know your voice. As your baby gets larger, you may feel fewer kicks and more stretching and rolling. Your baby may move into a head-down position. Your baby will also rest lower in your abdomen.  What do I need to know about prenatal care?  Your healthcare provider will check your blood pressure and weight. You may also  need the following:  A urine test  may also be done to check for sugar and protein. These can be signs of gestational diabetes or infection. Protein in your urine may also be a sign of preeclampsia. Preeclampsia is a condition that can develop during week 20 or later of your pregnancy. It causes high blood pressure, and it can cause problems with your kidneys and other organs.    A gestational diabetes screen  may be done. Your healthcare provider may order either a 1-step or 2-step oral glucose tolerance test (OGTT).     1-step OGTT:  Your blood sugar level will be tested after you have not eaten for 8 hours (fasting). You will then be given a glucose drink. Your level will be tested again 1 hour and 2 hours after you finish the drink.    2-step OGTT:  You do not have to fast for the first part of the test. You will have the glucose drink at any time of day. Your blood sugar level will be checked 1 hour later. If your blood sugar is higher than a certain level, another test will be ordered. You will fast and your blood sugar level will be tested. You will have the glucose drink. Your blood will be tested again 1 hour, 2 hours, and 3 hours after you finish the glucose drink.    A blood test  may be done to check for anemia (low iron level).    A Tdap vaccine  may be recommended by your healthcare provider.    A group B strep test  is a test that is done to check for group B strep infection. Group B strep is a type of bacteria that may be found in the vagina or rectum. It can be passed to your baby during delivery if you have it. Your healthcare provider will take swab your vagina or rectum and send the sample to the lab for tests.    Fundal height  is a measurement of your uterus to check your baby's growth. This number is usually the same as the number of weeks that you have been pregnant. Your healthcare provider may also check your baby's position.    Your baby's heart rate  will be checked.    When should I seek  immediate care?   You develop a severe headache that does not go away.    You have new or increased vision changes, such as blurred or spotted vision.    You have new or increased swelling in your face or hands.    You have vaginal spotting or bleeding.    Your water broke or you feel warm water gushing or trickling from your vagina.    When should I call my obstetrician?   You have more than 5 contractions in 1 hour.    You notice any changes in your baby's movements.    You have abdominal cramps, pressure, or tightening.    You have a change in vaginal discharge.    You have chills or a fever.    You have vaginal itching, burning, or pain.    You have yellow, green, white, or foul-smelling vaginal discharge.    You have pain or burning when you urinate, less urine than usual, or pink or bloody urine.    You have questions or concerns about your condition or care.    CARE AGREEMENT:   You have the right to help plan your care. Learn about your health condition and how it may be treated. Discuss treatment options with your healthcare providers to decide what care you want to receive. You always have the right to refuse treatment. The above information is an  only. It is not intended as medical advice for individual conditions or treatments. Talk to your doctor, nurse or pharmacist before following any medical regimen to see if it is safe and effective for you.  © Copyright [employer] 2022 Information is for End User's use only and may not be sold, redistributed or otherwise used for commercial purposes. All illustrations and images included in CareNotes® are the copyrighted property of [employer] or [employer]

## 2024-09-13 NOTE — PROGRESS NOTES
Progress Note - OB/GYN   Name: Marci Huber 29 y.o. female I MRN: [id number]  Unit/Bed#: LD TRIAGE 2- I Date of Admission: 2024   Date of Service: 2024 I Hospital Day: 0     Assessment & Plan  Decreased fetal movement      L&D Triage Note - OB/GYN  Marci Huber 29 y.o. female MRN: [id number]  Unit/Bed#: LD TRIAGE 2 Encounter: [id number]    Patient is seen by SLOGA    ASSESSMENT  Marci Huber is a 29 y.o.  at 36w4d who presents today for decreased fetal movement.    PLAN  #1. Decreased fetal movement:   NST reactive  JANET normal   Patient counseled on measuring kick counts and normal pregnancy changes  Patient advised to stay hydrated throughout pregnancy    #2. 36w4d pregnancy:   Continue with routine follow ups  Continue with PNV   Continue to monitor for signs of labor and come in if concerned    Discharge instructions  Patient instructed to call if experiencing worsening contractions, vaginal bleeding, loss of fluid or decreased fetal movement.  Will follow up with OBGYN on 24.    D/w Dr. Conte   ______________    SUBJECTIVE    SVITLANA: Estimated Date of Delivery: 10/7/24    HPI:  29 y.o.  36w4d presents with complaint of decreased fetal movement today. Patient states the last time she felt the baby move a lot was last night, and she endorsed 10 kicks in 1 sitting. Patient states she had a protein bar and drank cold water to wake baby up this morning but still didn't feel encouraging kicks, so she came in.  Patient denies fever, chills, n/v/d, and abdominal pain.      Contractions: present but not frequent or regular  Leakage of fluid: none  Vaginal Bleeding: none  Fetal movement: present    Her obstetrical history is significant for fetal macrosomia in previous pregnancy    ROS:  Constitutional: Negative  Respiratory: Negative  Cardiovascular: Negative    Gastrointestinal: Negative    OBJECTIVE:    Vitals:  /75 (BP Location: Right arm)   Pulse (!)  "112   Temp 98.3 °F (36.8 °C) (Oral)   Resp 18   Ht 5' 5\" (1.651 m)   Wt 94.4 kg (208 lb 3.2 oz)   LMP 12/22/2023 (Exact Date)   SpO2 98%   BMI 34.65 kg/m²   Body mass index is 34.65 kg/m².    Physical Exam  Constitutional:       General: She is not in acute distress.     Appearance: Normal appearance. She is normal weight. She is not ill-appearing or toxic-appearing.   HENT:      Head: Normocephalic and atraumatic.      Nose: Nose normal.      Mouth/Throat:      Mouth: Mucous membranes are moist.      Pharynx: Oropharynx is clear.   Eyes:      Conjunctiva/sclera: Conjunctivae normal.   Cardiovascular:      Rate and Rhythm: Normal rate and regular rhythm.      Pulses: Normal pulses.      Heart sounds: Normal heart sounds.   Pulmonary:      Effort: Pulmonary effort is normal. No respiratory distress.      Breath sounds: Normal breath sounds.   Abdominal:      General: Bowel sounds are normal.      Palpations: Abdomen is soft.      Tenderness: There is no abdominal tenderness. There is no guarding or rebound.   Musculoskeletal:         General: No swelling or tenderness. Normal range of motion.      Cervical back: Normal range of motion and neck supple.      Right lower leg: No edema.      Left lower leg: No edema.   Skin:     General: Skin is warm and dry.   Neurological:      General: No focal deficit present.      Mental Status: She is alert and oriented to person, place, and time. Mental status is at baseline.   Psychiatric:         Mood and Affect: Mood normal.         Behavior: Behavior normal.         Thought Content: Thought content normal.         Judgment: Judgment normal.         Speculum exam: N/A      Microscopy: N/A      SVE:  OB Examiner: LIZBETH    FHT:  Baseline Rate (FHR): 145 bpm  Variability: Moderate  Accelerations: 15 x 15 or greater, At variable times  Decelerations: None    TOCO:   Contraction Frequency (minutes): 2.5-6  Contraction Duration (seconds):     IMAGING:           " TAUS   JANET      - Q1 6.89 cm     - Q2 4.38 cm     - Q3 7.53 cm     - Q4 2.38 cm     - Total: 21.18 cm   Placenta: Anterior   Presentation: Vertex    Labs: No results found for this or any previous visit (from the past 24 hour(s)).      FM PGY-1  Alecia Serrano DO  9/13/2024  11:19 AM

## 2024-09-18 PROBLEM — Z3A.37 37 WEEKS GESTATION OF PREGNANCY: Status: ACTIVE | Noted: 2024-02-20

## 2024-09-18 NOTE — ASSESSMENT & PLAN NOTE
Marci Huber is a 29 y.o.   37w3d who presents for routine PNV.  Induction scheduled for 2024  28 week labs reviewed: CBC & RPR NML, abnormal glucose , monitoring glucose, diet controlled   TWG 1.814 kg (4 lb)   Denies OB complaints. Good fetal movement. Denies contractions, cramping, leakage of fluid or vaginal bleeding.   Tdap vaccine completed  Reviewed FKCs.   Advised to continue  Perineal massage   Pregnancy Essential guide and Baby and Me web site recommended

## 2024-09-18 NOTE — PATIENT INSTRUCTIONS
Patient Education     Pregnancy - The Ninth Month   About this topic   It is important for you to learn how to take care of yourself to help you have a healthy baby and safe delivery. It is good to have health care throughout your pregnancy.  The ninth month of your pregnancy starts around week 37 and lasts through delivery. By knowing how far along you are, you can learn what is normal for this stage of your pregnancy and plan for what is next.  General   Your body   During the ninth month of your pregnancy, here are some things you can expect.  You may:  Lose your mucous plug as your cervix starts to get thinner and dilate.  Notice a small amount of streaky red or pink spotting.  Your doctor may discuss stripping your membranes to help the labor progress.  Go into labor any time. Most women deliver their baby between 38 and 42 weeks.  Notice your belly button sticks out. It should go back to normal after you give birth.  Have a bit of extra energy as you get ready for your baby  Notice your breasts are leaking more. This is normal as your body is making the first milk you can feed your baby.  Have tests to check on how your baby is doing. Your doctor will most likely not let you be pregnant for more than 42 weeks.  Not gain any weight this month. Some mothers even lose 1 to 2 pounds (.45 to .9 kg).  Your baby's growth and development:  Your baby has been busy swallowing fluid and building up waste products for their first bowel movement.  Your baby may start sucking their thumb.  They may come out with dry skin, bruises, or a misshapen head. Living in a watery fluid and going through labor is tough on your baby too. These are all normal and will change in the first weeks of life.  Your baby may have lots of hair on their head or not very much at all. Long fingernails are normal.  Your baby is about 20 inches (51 cm) long and weighs about 7 1/2 pounds (3,400 gm). Your baby is about the size of a watermelon.  Things  to Think About   Avoid alcohol, drugs, tobacco products, and second hand smoke.  Talk to your doctor if you plan to travel or get on a plane.  Have your bag packed so you are ready for delivery.  Are you planning a natural childbirth or thinking about an epidural? Know things you can do to help cope with labor pain.  If you are having a boy, decide if you want to have him circumcised.  Be sure the car seat is installed the right way so you are ready to bring your baby home.  When do I need to call the doctor?   Contractions every 5 minutes or more often that do not go away with rest, drinking water, or position changes  Headache that does not go away; blurry vision; seeing spots or halos; increase in swelling in your hands, feet, or face; and pain under your ribs on the right side  Low, dull back pain that does not go away  Pressure in your pelvis that feels like your baby is pushing down  A gush or constant trickle of watery or bloody fluid leaking from your vagina  Little to no movement felt by baby in 2 hours. Your baby should be moving at least 10 times every 2 hours.  Vaginal bleeding with or without pain  Fever of 100.4°F (38°C) or higher  After a car accident, fall, or any trauma to your belly  Having thoughts of harming yourself or others, or do not feel safe at home  Last Reviewed Date   2020-05-06  Consumer Information Use and Disclaimer   This generalized information is a limited summary of diagnosis, treatment, and/or medication information. It is not meant to be comprehensive and should be used as a tool to help the user understand and/or assess potential diagnostic and treatment options. It does NOT include all information about conditions, treatments, medications, side effects, or risks that may apply to a specific patient. It is not intended to be medical advice or a substitute for the medical advice, diagnosis, or treatment of a health care provider based on the health care provider's examination  "and assessment of a patient’s specific and unique circumstances. Patients must speak with a health care provider for complete information about their health, medical questions, and treatment options, including any risks or benefits regarding use of medications. This information does not endorse any treatments or medications as safe, effective, or approved for treating a specific patient. UpToDate, Inc. and its affiliates disclaim any warranty or liability relating to this information or the use thereof. The use of this information is governed by the Terms of Use, available at https://www.MINDBODY.com/en/know/clinical-effectiveness-terms   Copyright   Copyright ©  UpToDate, Inc. and its affiliates and/or licensors. All rights reserved.      Patient Education     How to tell when labor starts   The Basics   Written by the doctors and editors at [employer]   What is labor? -- When a person is pregnant, labor is the process that leads to birth of the baby. Contractions of the uterus are the main sign of labor.  Labor usually starts on its own between 37 and 42 weeks of pregnancy. Your \"due date\" is at 40 weeks.  A pregnancy that lasts 37 to 42 weeks is called a \"term\" pregnancy. When labor starts before 37 weeks, doctors call it \"\" labor.  What are the signs that labor is starting? -- The signs that labor is starting, or about to start, can include the following:   The baby moves lower (or \"drops\") in your belly.   You have increased vaginal discharge that is thick, mucus-like, or slightly bloody. (\"Vaginal discharge\" is the term doctors use to describe the fluid that comes out of the vagina.) The increased vaginal discharge is sometimes called a \"mucus plug\" or a \"bloody show.\"   Your \"water breaks.\" During pregnancy, your baby is in a sac in your uterus and surrounded by a fluid called \"amniotic fluid.\" This sac typically breaks open sometime before your baby is born. When it breaks open, the fluid " "inside comes out of your vagina. This can feel like a big gush or just a trickle of fluid.   You have low back pain or belly cramps.   You start having contractions. During a contraction, the uterus tightens. This can be painful and make your belly feel hard. After a contraction, the uterus relaxes and the pain goes away. Some people have \"Richmond-Malcolm contractions\" or \"false-labor contractions.\" These feel like contractions, but they are not true contractions. They do not mean that you are in labor.  How can I tell if I'm having true contractions? -- It can be hard to tell if you are having true contractions or Richmond-Malcolm contractions. But here are some ways to help tell the difference:   True contractions come every few minutes and get more frequent over time. Richmond-Malcolm contractions can come every few minutes, but they don't get more frequent over time.   True contractions don't go away, even when you rest. Richmond-Malcolm contractions usually go away when you rest.   True contractions get stronger and more painful over time. Richmond-Malcolm contractions usually don't get stronger or more painful over time.   True contractions might be felt in your back and front. [place]il[place]-Malcolm contractions are usually only in front.  If you are still not sure whether you are having true contractions, call your doctor or midwife.  What should I do if I start having contractions? -- If you start having contractions, time them to see how far apart they are. That way, you can tell if they get more frequent.  You can time your contractions by keeping track of the time when each contraction starts. If you have a clock with a second hand or a timer on your smartphone, you can also time how long each contraction lasts. Your doctor or midwife will want to know how far apart your contractions are and how long they last.  When should I call my doctor or midwife? -- Call your doctor or midwife if you think that you are in labor. You " "should also call if any of the following things happen:   You have blood, mucus, or fluid leaking from your vagina.   You have 6 or more contractions in 1 hour. (This means that your contractions are 10 minutes apart or less.)   Your contractions are getting stronger and are painful.  Your doctor or midwife will probably want to see you to do an exam. To tell if you are in labor, they will check your cervix to see if it is opening (\"dilated\") and thinning out. They will see how frequent your contractions are. They might also do other tests.  What if my labor starts too soon? -- If you start having any symptoms of labor before 37 weeks, call your doctor right away. They might want to give you medicine to try to stop your labor or help the baby if it is born early.  What if my labor doesn't start on its own? -- If your labor doesn't start on its own, your doctor will talk to you about your options. They might try to start your labor with medicines. This is called \"inducing labor.\"  How long will my labor last? -- If it's your first baby, your labor will probably last for many hours. If it's not your first baby, your labor will probably be shorter.  All topics are updated as new evidence becomes available and our peer review process is complete.  This topic retrieved from [employer] on: Feb 26, 2024.  Topic 83358 Version 12.0  Release: 32.2.4 - C32.56  © 2024 UpToDate, Inc. and/or its affiliates. All rights reserved.  Consumer Information Use and Disclaimer   Disclaimer: This generalized information is a limited summary of diagnosis, treatment, and/or medication information. It is not meant to be comprehensive and should be used as a tool to help the user understand and/or assess potential diagnostic and treatment options. It does NOT include all information about conditions, treatments, medications, side effects, or risks that may apply to a specific patient. It is not intended to be medical advice or a substitute for " "the medical advice, diagnosis, or treatment of a health care provider based on the health care provider's examination and assessment of a patient's specific and unique circumstances. Patients must speak with a health care provider for complete information about their health, medical questions, and treatment options, including any risks or benefits regarding use of medications. This information does not endorse any treatments or medications as safe, effective, or approved for treating a specific patient. UpToDate, Inc. and its affiliates disclaim any warranty or liability relating to this information or the use thereof.The use of this information is governed by the Terms of Use, available at https://www.[employer].com/en/know/clinical-effectiveness-terms. 2024© UpToDate, Inc. and its affiliates and/or licensors. All rights reserved.  Copyright   © 2024 UpToDate, Inc. and/or its affiliates. All rights reserved.  Patient Education     Labor induction   The Basics   Written by the doctors and editors at [facility]   What happens during labor? -- When a person is pregnant, labor is the process that leads to the birth of the baby. During labor, you will have contractions, which is when the uterus tightens. Your cervix will soften, thin out, and open up, or \"dilate.\" As you get closer to giving birth, your baby will move down from your uterus into your vagina (figure 1).  Most of the time, labor starts on its own between 37 and 42 weeks of pregnancy.  What is labor induction? -- This means using medicines to help start labor. It is also called \"inducing labor.\"  Your doctor or midwife might recommend induction if:   You are more than 41 weeks pregnant, and labor has not started on its own.   Your water has broken, and labor has not started on its own.   You have certain health conditions, like high blood pressure, diabetes, or an infection.   There is a problem with the placenta.   Your baby is not growing as expected.   " "There is a problem with the amniotic fluid. (This is the liquid that surrounds the baby in the uterus.)   You are pregnant with twins.  Doctors only induce labor before 39 weeks of pregnancy if there is a medical reason. Usually, this means a situation where waiting to give birth could be dangerous for you or your baby.  Some people choose to be induced at, or after, 39 weeks. This might be an option if you are certain about your due date.  How do I prepare for labor induction? -- Before your induction, your doctor or midwife will do an exam. They will also:   Confirm exactly how many weeks pregnant you are   Check your baby's position   Estimate how much your baby weighs   Check your cervix   Check your baby's heart rate  What happens during labor induction? -- When it is time for your induction:   If your cervix has not started to soften or dilate, the doctor or midwife can use medicine or a device to help with this. This process is called \"cervical ripening.\" It might be done with:   A balloon catheter - This is a thin tube that goes into the vagina and through the cervix. It has a small balloon on the end. The balloon inflates to help slowly open the cervix.   Medicine - This can be given by mouth or into the vagina. The medicine slowly softens and dilates the cervix. It might also cause contractions.   You will get an \"IV,\" which is a thin tube that goes into a vein. This can be used to give you fluids and medicines.   If your water has not broken, the doctor or midwife might break your water. To do this, they will insert a finger or a special tool through your cervix and break the amniotic sac.   Sometimes, cervical ripening or breaking the water is enough to get labor started. But most people also get a medicine called oxytocin (brand name: Pitocin). This is given through the IV.   You should start having contractions. It can take a while for contractions to start, sometimes 12 hours or longer. Your doctor or " "midwife will monitor your contractions to see how frequent they are. They will also monitor how much your cervix dilates over time.   The staff will monitor your baby's heart rate during labor induction.  What happens after labor induction? -- If the induction works, your labor should continue until you give birth. Your doctor or midwife will talk to you about your options for pain relief, if you choose this.  Sometimes, labor induction does not work. In this case, you will probably need to have a  birth ().  What are the risks of labor induction? -- Your doctor or midwife will talk to you about all of the possible risks, and answer your questions.  Possible risks of labor induction with oxytocin include:   Contractions happening too frequently   Changes in your baby's heart rate   A tear in the uterus   Longer labor than if labor began on its own  There is also always a chance that the induction will not work and you will need to have a  birth.  What else should I know? -- Labor induction is not always an option. This depends on many different things, including your health, your baby's health, your past births, and your baby's position. Your doctor or midwife can talk to you about your situation. They will help you make the decision that is best for you and your baby.  You might have heard about \"natural\" ways to induce labor. But there is not good evidence that these things work. Experts do not recommend things like drinking castor oil, getting acupuncture, or having sex as a way to try to start labor.  All topics are updated as new evidence becomes available and our peer review process is complete.  This topic retrieved from [employer] on: May 15, 2024.  Topic 305128 Version 1.0  Release: 32.4.3 - C32.134  2024 UpToDate, Inc. and/or its affiliates. All rights reserved.  figure 1: Pregnancy terms     This drawing shows a baby inside the uterus late in pregnancy.  Graphic 46681 Version " 7.0  Consumer Information Use and Disclaimer   Disclaimer: This generalized information is a limited summary of diagnosis, treatment, and/or medication information. It is not meant to be comprehensive and should be used as a tool to help the user understand and/or assess potential diagnostic and treatment options. It does NOT include all information about conditions, treatments, medications, side effects, or risks that may apply to a specific patient. It is not intended to be medical advice or a substitute for the medical advice, diagnosis, or treatment of a health care provider based on the health care provider's examination and assessment of a patient's specific and unique circumstances. Patients must speak with a health care provider for complete information about their health, medical questions, and treatment options, including any risks or benefits regarding use of medications. This information does not endorse any treatments or medications as safe, effective, or approved for treating a specific patient. UpToDate, Inc. and its affiliates disclaim any warranty or liability relating to this information or the use thereof.The use of this information is governed by the Terms of Use, available at https://www.woltersLIN TVuwer.com/en/know/clinical-effectiveness-terms. 2024© UpToDate, Inc. and its affiliates and/or licensors. All rights reserved.  Copyright   © 2024 UpToDate, Inc. and/or its affiliates. All rights reserved.

## 2024-09-19 ENCOUNTER — ROUTINE PRENATAL (OUTPATIENT)
Dept: OBGYN CLINIC | Facility: CLINIC | Age: 29
End: 2024-09-19
Payer: COMMERCIAL

## 2024-09-19 VITALS
BODY MASS INDEX: 33.99 KG/M2 | OXYGEN SATURATION: 100 % | WEIGHT: 204 LBS | SYSTOLIC BLOOD PRESSURE: 126 MMHG | DIASTOLIC BLOOD PRESSURE: 74 MMHG | HEIGHT: 65 IN | HEART RATE: 104 BPM

## 2024-09-19 DIAGNOSIS — O24.410 DIET CONTROLLED GESTATIONAL DIABETES MELLITUS (GDM) IN THIRD TRIMESTER: ICD-10-CM

## 2024-09-19 DIAGNOSIS — Z3A.37 37 WEEKS GESTATION OF PREGNANCY: Primary | ICD-10-CM

## 2024-09-19 LAB
SL AMB  POCT GLUCOSE, UA: NORMAL
SL AMB POCT URINE PROTEIN: NORMAL

## 2024-09-19 PROCEDURE — 81002 URINALYSIS NONAUTO W/O SCOPE: CPT | Performed by: OBSTETRICS & GYNECOLOGY

## 2024-09-19 PROCEDURE — PNV: Performed by: OBSTETRICS & GYNECOLOGY

## 2024-09-19 NOTE — PROGRESS NOTES
"Problem   Diet Controlled Gestational Diabetes Mellitus (Gdm) in Third Trimester   37 Weeks Gestation of Pregnancy    Up to date to 28 wk visit  Tdap: [ ]  Flu/covid-done   GBS swab:     Yellow folder: given   Breast pump order:  L&D forms:  Delivery consent: signed    IOL:        37 weeks gestation of pregnancy  Marci Huber is a 29 y.o.   37w3d who presents for routine PNV.  Induction scheduled for 2024  28 week labs reviewed: CBC & RPR NML, abnormal glucose , monitoring glucose, diet controlled   TWG 1.814 kg (4 lb)   Denies OB complaints. Good fetal movement. Denies contractions, cramping, leakage of fluid or vaginal bleeding.   Tdap vaccine completed  Reviewed FKCs.   Advised to continue  Perineal massage   Pregnancy Essential guide and Baby and Me web site recommended       Diet controlled gestational diabetes mellitus (GDM) in third trimester  Submitting values to MFM,   Diet controlled   No results found for: \"HGBA1C\"    "

## 2024-09-19 NOTE — ASSESSMENT & PLAN NOTE
"Submitting values to [facility],   Diet controlled   No results found for: \"HGBA1C\"  "

## 2024-09-19 NOTE — PROGRESS NOTES
37w3d  Pap 2024. Neg HPV Neg  GC/CT: Neg / Neg   PN1 Labs: 3/25/2024  Blood Type: B Positive   MFM Level 1:3/29/2024  MFM Level 2:2024  AFP:   28 Week Labs:7/3/2024 ABN.  3 hour glucose was ABN . Patient is monitoring her glucose .   TDap:2024  Flu:  GBS: 2024 Negative    Blue Folder: given   Yellow Folder:given   Ped Referral :  Breast pump:  L&D forms: done   Delivery consent:  done     Patient reports positive fetal movements with no concerns at this time.  Patient reports that she is doing well so in her diabetes .   Patient is schedule for induction on 2024.
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
## 2024-09-23 ENCOUNTER — NURSE TRIAGE (OUTPATIENT)
Age: 29
End: 2024-09-23

## 2024-09-23 NOTE — TELEPHONE ENCOUNTER
38 weeks 0 days EDC 10/7/24  Friday started with head cold symptoms  Thursday 9/19 into Friday 9/20 had sweats and chills, Tylenol temp not over 99,normal last few days.Sore throat has gone away but c/o congestion, cough and sinus pressure.No taste or smell, this started yesterday.  Patient did not test for covid, up to date with Covid shots and boosters. No one else in household is sick. Using Sugar free cough drops, sugar free gator aid fluids, Vicks vapor rub. IOL 9/30/24.  Baby is moving well, no bleeding, no LOF. Has occ irregular contractions but this has been ongoing for last few week.  Advised increase fluids and discussed medications safe in pregnancy: -plain Robitussin, Benadryl, Claritin, Zyrtec etc., cool mist humidifier, Neti pot with warm saline for congestion. Reviewed to call back/ seek ED care if short of breath, chest pain, high fever or any other concerning symptoms. Patient has routine OB visit 9/25, she will wear mask.   ESC to Dr. Levon Villarreal who reviewed note.         Reason for Disposition   Sinus congestion as part of a cold, present < 10 days    Protocols used: Sinus Pain or Congestion-ADULT-[place]

## 2024-09-25 ENCOUNTER — TELEPHONE (OUTPATIENT)
Dept: OBGYN CLINIC | Facility: MEDICAL CENTER | Age: 29
End: 2024-09-25

## 2024-09-25 ENCOUNTER — ROUTINE PRENATAL (OUTPATIENT)
Dept: OBGYN CLINIC | Facility: MEDICAL CENTER | Age: 29
End: 2024-09-25
Payer: COMMERCIAL

## 2024-09-25 VITALS
TEMPERATURE: 97.4 F | BODY MASS INDEX: 33.99 KG/M2 | HEIGHT: 65 IN | DIASTOLIC BLOOD PRESSURE: 66 MMHG | HEART RATE: 112 BPM | SYSTOLIC BLOOD PRESSURE: 118 MMHG | WEIGHT: 204 LBS

## 2024-09-25 DIAGNOSIS — Z34.83 PRENATAL CARE, SUBSEQUENT PREGNANCY IN THIRD TRIMESTER: ICD-10-CM

## 2024-09-25 DIAGNOSIS — O99.213 SEVERE OBESITY DUE TO EXCESS CALORIES AFFECTING PREGNANCY IN THIRD TRIMESTER (HCC): ICD-10-CM

## 2024-09-25 DIAGNOSIS — O36.62X0: ICD-10-CM

## 2024-09-25 DIAGNOSIS — E66.01 SEVERE OBESITY DUE TO EXCESS CALORIES AFFECTING PREGNANCY IN THIRD TRIMESTER (HCC): ICD-10-CM

## 2024-09-25 DIAGNOSIS — Z3A.38 38 WEEKS GESTATION OF PREGNANCY: ICD-10-CM

## 2024-09-25 DIAGNOSIS — O24.410 DIET CONTROLLED GESTATIONAL DIABETES MELLITUS (GDM) IN THIRD TRIMESTER: Primary | ICD-10-CM

## 2024-09-25 LAB
SL AMB  POCT GLUCOSE, UA: NORMAL
SL AMB POCT URINE PROTEIN: NORMAL

## 2024-09-25 PROCEDURE — PNV: Performed by: CLINICAL NURSE SPECIALIST

## 2024-09-25 PROCEDURE — 81002 URINALYSIS NONAUTO W/O SCOPE: CPT | Performed by: CLINICAL NURSE SPECIALIST

## 2024-09-25 NOTE — ASSESSMENT & PLAN NOTE
38w2d Doing well, reports fetus remains active.  Cervix- ext os 2 cm, internal os closed.  Vertex by US.   Has IOLD 9/30 at 7 am    I have reviewed the signs and symptoms of labor with the patient, including contractions q4-5 minutes for greater than 1 hour, vaginal bleeding, leaking fluid and decreased fetal movement.  Reviewed process for [facility].I have emphasized the continued importance of paying close attention to the baby's movements.  I have instructed the patient to call the office with any of the above symptoms prior to coming to the hospital.     Signs of pre-eclampsia were also reviewed with the patient: headache, visual changes, sudden increased edema and/or severe right upper quadrant pain.

## 2024-09-25 NOTE — ASSESSMENT & PLAN NOTE
Continues to submit BG regularly.  BG overall well controlled, a few mild elevations PP- mostly r/t diet choices per notes.  Last us with overall EFW 89% and AC 94%, nml JANET

## 2024-09-25 NOTE — TELEPHONE ENCOUNTER
Patient called back, she is fever free and has been for several days. Has occ runny nose but no other symptoms. Patient does not have mask but willing to wear one if office can provide.

## 2024-09-25 NOTE — PROGRESS NOTES
Prenatal Visit  Subjective:   Marci is a 29 y.o.  38w2d here for Routine Prenatal Visit (SVITLANA 10/7/24/Blood type B+/Labs UTD /Del consent signed/Breast pump not needed /Tdap 24/Flu/RSV/Urine /GBS neg/Denies LOF, VB, or CTX./Pt has +FM/Questions or conerns- )       Denies unusual vaginal discharge, LOF, VB, or cramping/ctx.  Good FM  Having URI symptoms- symptoms started . cough, + sore throat, Congestion . Today is afebrile and reports symptoms are improving.     BG overall well controlled, a few mild elevations PP- mostly r/t diet choices per notes.    Objective:  Vitals:    24 1547   BP: 118/66   Pulse: (!) 112   Temp: (!) 97.4 °F (36.3 °C)     Pregravid Weight/BMI: 90.7 kg (200 lb) (BMI 33.28)  Current Weight: 92.5 kg (204 lb)   Total Weight Gain: 1.814 kg (4 lb)     OBGyn Exam  Fetal Heart Rate: 145 , Fundal Height (cm): 38 cm    Assessment & Plan:    1. Diet controlled gestational diabetes mellitus (GDM) in third trimester  Assessment & Plan:  Continues to submit BG regularly.  BG overall well controlled, a few mild elevations PP- mostly r/t diet choices per notes.  Last us with overall EFW 89% and AC 94%, nml JANET  2. Prenatal care, subsequent pregnancy in third trimester  -     POCT urine dip  3. Severe obesity due to excess calories affecting pregnancy in third trimester ([facility])  Overview:  Pre-gest BMI 32  nml early glucola  Assessment & Plan:  TWG 1.814 kg (4 lb)  See other A&P for macrosomia and DM  4. 38 weeks gestation of pregnancy  Overview:  Up to date to 28 wk visit  Tdap: [x]  Flu/covid-done   GBS swab: neg    Yellow folder: given   Breast pump order:  L&D forms:  Delivery consent: signed    IOL:   Assessment & Plan:  38w2d Doing well, reports fetus remains active.  Cervix- ext os 2 cm, internal os closed.  Vertex by US.   Has IOLD  at 7 am    I have reviewed the signs and symptoms of labor with the patient, including contractions q4-5 minutes for greater than 1  hour, vaginal bleeding, leaking fluid and decreased fetal movement.  Reviewed process for [facility]KC.I have emphasized the continued importance of paying close attention to the baby's movements.  I have instructed the patient to call the office with any of the above symptoms prior to coming to the hospital.     Signs of pre-eclampsia were also reviewed with the patient: headache, visual changes, sudden increased edema and/or severe right upper quadrant pain.    5. Macrosomia affecting management of mother, second trimester, not applicable or unspecified fetus  Overview:  Noted at 24 weeks.  She was diagnosed with macrosomia in her previous pregnancy by ultrasound and ultimately delivered an 8 pound 6 ounce baby at 39 weeks.  US 8/12 w/ EFW 89%, AC 94%      KATT Ruth  9/25/2024

## 2024-09-25 NOTE — TELEPHONE ENCOUNTER
LM for Marci to call back and let us know how she's feeling. She needs to be generally feeling better, fever free for 24 hours etc. Please transfer in to [place] when she calls back. Thank you!

## 2024-09-26 ENCOUNTER — NURSE TRIAGE (OUTPATIENT)
Age: 29
End: 2024-09-26

## 2024-09-26 ENCOUNTER — HOSPITAL ENCOUNTER (INPATIENT)
Facility: HOSPITAL | Age: 29
LOS: 2 days | Discharge: HOME | End: 2024-09-28
Attending: STUDENT IN AN ORGANIZED HEALTH CARE EDUCATION/TRAINING PROGRAM | Admitting: STUDENT IN AN ORGANIZED HEALTH CARE EDUCATION/TRAINING PROGRAM
Payer: COMMERCIAL

## 2024-09-26 DIAGNOSIS — Z3A.38 38 WEEKS GESTATION OF PREGNANCY: Primary | ICD-10-CM

## 2024-09-26 PROBLEM — O47.9 UTERINE CONTRACTIONS: Status: ACTIVE | Noted: 2024-09-26

## 2024-09-26 LAB
ABO GROUP BLD: NORMAL
BLD GP AB SCN SERPL QL: NEGATIVE
ERYTHROCYTE [DISTWIDTH] IN BLOOD BY AUTOMATED COUNT: 15.1 % (ref 11.6–15.1)
GLUCOSE SERPL-MCNC: 118 MG/DL (ref 65–140)
GLUCOSE SERPL-MCNC: 88 MG/DL (ref 65–140)
GLUCOSE SERPL-MCNC: 93 MG/DL (ref 65–140)
HCT VFR BLD AUTO: 36.3 % (ref 34.8–46.1)
HGB BLD-MCNC: 11.4 G/DL (ref 11.5–15.4)
HOLD SPECIMEN: NORMAL
MCH RBC QN AUTO: 22.5 PG (ref 26.8–34.3)
MCHC RBC AUTO-ENTMCNC: 31.4 G/DL (ref 31.4–37.4)
MCV RBC AUTO: 72 FL (ref 82–98)
PLATELET # BLD AUTO: 281 THOUSANDS/UL (ref 149–390)
PMV BLD AUTO: 9.3 FL (ref 8.9–12.7)
RBC # BLD AUTO: 5.06 MILLION/UL (ref 3.81–5.12)
RH BLD: POSITIVE
SPECIMEN EXPIRATION DATE: NORMAL
WBC # BLD AUTO: 11.25 THOUSAND/UL (ref 4.31–10.16)

## 2024-09-26 PROCEDURE — 86901 BLOOD TYPING SEROLOGIC RH(D): CPT

## 2024-09-26 PROCEDURE — 86780 TREPONEMA PALLIDUM: CPT

## 2024-09-26 PROCEDURE — 82948 REAGENT STRIP/BLOOD GLUCOSE: CPT

## 2024-09-26 PROCEDURE — 85027 COMPLETE CBC AUTOMATED: CPT

## 2024-09-26 PROCEDURE — 86850 RBC ANTIBODY SCREEN: CPT

## 2024-09-26 PROCEDURE — 99214 OFFICE O/P EST MOD 30 MIN: CPT

## 2024-09-26 PROCEDURE — 86900 BLOOD TYPING SEROLOGIC ABO: CPT

## 2024-09-26 PROCEDURE — NC001 PR NO CHARGE: Performed by: STUDENT IN AN ORGANIZED HEALTH CARE EDUCATION/TRAINING PROGRAM

## 2024-09-26 RX ORDER — SODIUM CHLORIDE FOR INHALATION 0.9 %
VIAL, NEBULIZER (ML) INHALATION
Status: DISPENSED
Start: 2024-09-26 | End: 2024-09-27

## 2024-09-26 RX ORDER — ONDANSETRON 2 MG/ML
4 INJECTION INTRAMUSCULAR; INTRAVENOUS EVERY 6 HOURS PRN
Status: DISCONTINUED | OUTPATIENT
Start: 2024-09-26 | End: 2024-09-27

## 2024-09-26 RX ORDER — BUPIVACAINE HYDROCHLORIDE 2.5 MG/ML
30 INJECTION, SOLUTION EPIDURAL; INFILTRATION; INTRACAUDAL ONCE AS NEEDED
Status: DISCONTINUED | OUTPATIENT
Start: 2024-09-26 | End: 2024-09-27

## 2024-09-26 RX ORDER — SODIUM CHLORIDE, SODIUM LACTATE, POTASSIUM CHLORIDE, CALCIUM CHLORIDE 600; 310; 30; 20 MG/100ML; MG/100ML; MG/100ML; MG/100ML
125 INJECTION, SOLUTION INTRAVENOUS CONTINUOUS
Status: DISCONTINUED | OUTPATIENT
Start: 2024-09-26 | End: 2024-09-28 | Stop reason: HOSPADM

## 2024-09-26 RX ADMIN — SODIUM CHLORIDE, SODIUM LACTATE, POTASSIUM CHLORIDE, AND CALCIUM CHLORIDE 125 ML/HR: .6; .31; .03; .02 INJECTION, SOLUTION INTRAVENOUS at 21:35

## 2024-09-26 NOTE — PROGRESS NOTES
L&D Triage Note - OB/GYN  Marci Huber 29 y.o. female MRN: [id number]  Unit/Bed#:  TRIAGE  Encounter: [id number]      ASSESSMENT:    Marci Huber is a 29 y.o.  at 38w3d who was evaluated today in triage for contractions. SVE 2.5/60/-3 with *** on tocometer. The patient does not appear to be in labor and it is safe to discharge home.     PLAN:    1) Contractions  - SVE: 2.5/60/-3  - [place]: ***  - Encouraged PO hydration, warm bath/shower, reassurance    38w3d weeks gestation of pregnancy  - Continue routine prenatal care  - Discharge from OB triage with labor precautions    - Reviewed rupture of membranes, false vs true labor, decreased fetal movement, and vaginal bleeding   - Pt to call provider with any concerns and follow up at her next scheduled prenatal appointment    - Case discussed with Dr. Erickson    SUBJECTIVE:    Marci Huber 29 y.o.  at 38w3d with an Estimated Date of Delivery: 10/7/24 presenting with abdominal pain. Began having intermittent contractions starting at 4pm today with variable frequency ranging between 2-7 minutes apart. Water has not broke. No vaginal bleeding. Active fetal movement. Has chronic low back tightness with no additional acute symptoms or pains.       Her past obstetrical history is uncomplicated with 1 spontaneous vaginal birth at term. This pregnancy has been uncomplicated.    Contractions: Intermittent with variable frequency  Leakage of fluid: Denies  Vaginal Bleeding: Denies  Fetal movement: present    OBJECTIVE:    Vitals:    24 1827   Resp: 18   Temp: 98.3 °F (36.8 °C)       ROS:  Constitutional: Negative  Respiratory: Negative  Cardiovascular: Negative    Gastrointestinal: Negative    General Physical Exam:  General: Well appearing, no distress  Respiratory: Unlabored breathing  Cardiovascular: Regular rate.  Abdomen: Soft, gravid, nontender  Fundal Height: Appropriate for gestational age.  Extremities: Warm and well  perfused.  Non tender.      Fetal monitoring:  Fetal heart rate: Variability: Moderate  [place]:        Cervical Exam  SVE: 2.5/60/-3        Andres Mcmahan MD  9/26/2024  6:46 PM

## 2024-09-26 NOTE — TELEPHONE ENCOUNTER
"Incoming call from patient. OB patient 38w3d  has been having on and off contractions since this morning. Have intensified over the past hour. Are occurring every 5 minutes. Rates pain 5/10. Denies LOF, vaginal bleeding. Endorses fetal movement. Advised evaluation in L&D. ETA 1800.     ESC sent to Dr. Erickson and AN L&D Charge RN as notification.     Reason for Disposition   History of prior delivery (multipara) with contractions < 10 minutes apart, and present 1 hour    Answer Assessment - Initial Assessment Questions  1. ONSET: \"When did the symptoms begin?\"         Past hour, having contractions every 5 minutes.     2. CONTRACTIONS: \"Describe the contractions that you are having.\" (e.g., duration, frequency, regularity, severity)      Occurring every 5 minutes.     3. SVITLANA: \"What date are you expecting to deliver?\"      10/7/24    4. PARITY: \"Have you had a baby before?\" If Yes, ask: \"How long did the labor last?\"          5. FETAL MOVEMENT: \"Has the baby's movement decreased or changed significantly from normal?\"      Normal fetal movement     6. OTHER SYMPTOMS: \"Do you have any other symptoms?\" (e.g., leaking fluid from vagina, vaginal bleeding, fever, hand/facial swelling)      Denies    Protocols used: Pregnancy - Labor-ADULT-[place]    "

## 2024-09-27 ENCOUNTER — ANESTHESIA EVENT (INPATIENT)
Dept: ANESTHESIOLOGY | Facility: HOSPITAL | Age: 29
End: 2024-09-27
Payer: COMMERCIAL

## 2024-09-27 ENCOUNTER — ANESTHESIA (INPATIENT)
Dept: ANESTHESIOLOGY | Facility: HOSPITAL | Age: 29
End: 2024-09-27
Payer: COMMERCIAL

## 2024-09-27 LAB
BASE EXCESS BLDCOA CALC-SCNC: -5.3 MMOL/L (ref 3–11)
BASE EXCESS BLDCOV CALC-SCNC: -1.4 MMOL/L (ref 1–9)
GLUCOSE SERPL-MCNC: 104 MG/DL (ref 65–140)
GLUCOSE SERPL-MCNC: 67 MG/DL (ref 65–140)
GLUCOSE SERPL-MCNC: 70 MG/DL (ref 65–140)
GLUCOSE SERPL-MCNC: 70 MG/DL (ref 65–140)
GLUCOSE SERPL-MCNC: 77 MG/DL (ref 65–140)
GLUCOSE SERPL-MCNC: 79 MG/DL (ref 65–140)
GLUCOSE SERPL-MCNC: 79 MG/DL (ref 65–140)
GLUCOSE SERPL-MCNC: 81 MG/DL (ref 65–140)
GLUCOSE SERPL-MCNC: 86 MG/DL (ref 65–140)
GLUCOSE SERPL-MCNC: 89 MG/DL (ref 65–140)
HCO3 BLDCOA-SCNC: 21.8 MMOL/L (ref 17.3–27.3)
HCO3 BLDCOV-SCNC: 21.6 MMOL/L (ref 12.2–28.6)
O2 CT VFR BLDCOA CALC: 11.7 ML/DL
OXYHGB MFR BLDCOA: 49.2 %
OXYHGB MFR BLDCOV: 68.2 %
PCO2 BLDCOA: 47.8 MM[HG] (ref 30–60)
PCO2 BLDCOV: 32.1 MM HG (ref 27–43)
PH BLDCOA: 7.28 [PH] (ref 7.23–7.43)
PH BLDCOV: 7.45 [PH] (ref 7.19–7.49)
PO2 BLDCOA: 23.2 MM HG (ref 5–25)
PO2 BLDCOV: 27.5 MM HG (ref 15–45)
SAO2 % BLDCOV: 15.4 ML/DL
TREPONEMA PALLIDUM IGG+IGM AB [PRESENCE] IN SERUM OR PLASMA BY IMMUNOASSAY: NORMAL

## 2024-09-27 PROCEDURE — 10907ZC DRAINAGE OF AMNIOTIC FLUID, THERAPEUTIC FROM PRODUCTS OF CONCEPTION, VIA NATURAL OR ARTIFICIAL OPENING: ICD-10-PCS | Performed by: OBSTETRICS & GYNECOLOGY

## 2024-09-27 PROCEDURE — 82805 BLOOD GASES W/O2 SATURATION: CPT | Performed by: STUDENT IN AN ORGANIZED HEALTH CARE EDUCATION/TRAINING PROGRAM

## 2024-09-27 PROCEDURE — 82948 REAGENT STRIP/BLOOD GLUCOSE: CPT

## 2024-09-27 PROCEDURE — 3E033VJ INTRODUCTION OF OTHER HORMONE INTO PERIPHERAL VEIN, PERCUTANEOUS APPROACH: ICD-10-PCS | Performed by: OBSTETRICS & GYNECOLOGY

## 2024-09-27 PROCEDURE — 0KQM0ZZ REPAIR PERINEUM MUSCLE, OPEN APPROACH: ICD-10-PCS | Performed by: OBSTETRICS & GYNECOLOGY

## 2024-09-27 PROCEDURE — 59400 OBSTETRICAL CARE: CPT | Performed by: OBSTETRICS & GYNECOLOGY

## 2024-09-27 RX ORDER — OXYTOCIN/RINGER'S LACTATE 30/500 ML
PLASTIC BAG, INJECTION (ML) INTRAVENOUS
Status: COMPLETED
Start: 2024-09-27 | End: 2024-09-27

## 2024-09-27 RX ORDER — ACETAMINOPHEN 325 MG/1
975 TABLET ORAL EVERY 6 HOURS
Status: DISCONTINUED | OUTPATIENT
Start: 2024-09-27 | End: 2024-09-28 | Stop reason: HOSPADM

## 2024-09-27 RX ORDER — BENZOCAINE/MENTHOL 6 MG-10 MG
1 LOZENGE MUCOUS MEMBRANE DAILY PRN
Status: DISCONTINUED | OUTPATIENT
Start: 2024-09-27 | End: 2024-09-28 | Stop reason: HOSPADM

## 2024-09-27 RX ORDER — IBUPROFEN 600 MG/1
600 TABLET, FILM COATED ORAL EVERY 6 HOURS
Status: DISCONTINUED | OUTPATIENT
Start: 2024-09-27 | End: 2024-09-28 | Stop reason: HOSPADM

## 2024-09-27 RX ORDER — DIPHENHYDRAMINE HYDROCHLORIDE 50 MG/ML
25 INJECTION INTRAMUSCULAR; INTRAVENOUS EVERY 6 HOURS PRN
Status: DISCONTINUED | OUTPATIENT
Start: 2024-09-27 | End: 2024-09-28 | Stop reason: HOSPADM

## 2024-09-27 RX ORDER — CALCIUM CARBONATE 500 MG/1
1000 TABLET, CHEWABLE ORAL 3 TIMES DAILY PRN
Status: DISCONTINUED | OUTPATIENT
Start: 2024-09-27 | End: 2024-09-28 | Stop reason: HOSPADM

## 2024-09-27 RX ORDER — OXYTOCIN/RINGER'S LACTATE 30/500 ML
250 PLASTIC BAG, INJECTION (ML) INTRAVENOUS ONCE
Status: DISCONTINUED | OUTPATIENT
Start: 2024-09-27 | End: 2024-09-28 | Stop reason: HOSPADM

## 2024-09-27 RX ORDER — LIDOCAINE HYDROCHLORIDE AND EPINEPHRINE 15; 5 MG/ML; UG/ML
INJECTION, SOLUTION EPIDURAL
Status: COMPLETED | OUTPATIENT
Start: 2024-09-27 | End: 2024-09-27

## 2024-09-27 RX ORDER — ONDANSETRON 2 MG/ML
4 INJECTION INTRAMUSCULAR; INTRAVENOUS EVERY 8 HOURS PRN
Status: DISCONTINUED | OUTPATIENT
Start: 2024-09-27 | End: 2024-09-28 | Stop reason: HOSPADM

## 2024-09-27 RX ORDER — HYDROXYZINE HYDROCHLORIDE 25 MG/1
25 TABLET, FILM COATED ORAL EVERY 6 HOURS PRN
Status: DISCONTINUED | OUTPATIENT
Start: 2024-09-27 | End: 2024-09-27

## 2024-09-27 RX ORDER — SIMETHICONE 80 MG
80 TABLET,CHEWABLE ORAL 4 TIMES DAILY PRN
Status: DISCONTINUED | OUTPATIENT
Start: 2024-09-27 | End: 2024-09-28 | Stop reason: HOSPADM

## 2024-09-27 RX ORDER — OXYTOCIN/RINGER'S LACTATE 30/500 ML
1-30 PLASTIC BAG, INJECTION (ML) INTRAVENOUS
Status: DISCONTINUED | OUTPATIENT
Start: 2024-09-27 | End: 2024-09-28 | Stop reason: HOSPADM

## 2024-09-27 RX ADMIN — OXYTOCIN 2 MILLI-UNITS/MIN: 10 INJECTION INTRAVENOUS at 06:01

## 2024-09-27 RX ADMIN — SODIUM CHLORIDE, SODIUM LACTATE, POTASSIUM CHLORIDE, AND CALCIUM CHLORIDE 125 ML/HR: .6; .31; .03; .02 INJECTION, SOLUTION INTRAVENOUS at 06:02

## 2024-09-27 RX ADMIN — LIDOCAINE HYDROCHLORIDE AND EPINEPHRINE 2 ML: 15; 5 INJECTION, SOLUTION EPIDURAL at 01:57

## 2024-09-27 RX ADMIN — BENZOCAINE AND LEVOMENTHOL 1 APPLICATION: 200; 5 SPRAY TOPICAL at 12:32

## 2024-09-27 RX ADMIN — WITCH HAZEL 1 PAD: 500 SOLUTION RECTAL; TOPICAL at 12:32

## 2024-09-27 RX ADMIN — SODIUM CHLORIDE, SODIUM LACTATE, POTASSIUM CHLORIDE, AND CALCIUM CHLORIDE 125 ML/HR: .6; .31; .03; .02 INJECTION, SOLUTION INTRAVENOUS at 02:11

## 2024-09-27 RX ADMIN — ROPIVACAINE HYDROCHLORIDE: 2 INJECTION, SOLUTION EPIDURAL; INFILTRATION at 11:05

## 2024-09-27 RX ADMIN — ACETAMINOPHEN 325MG 975 MG: 325 TABLET ORAL at 21:42

## 2024-09-27 RX ADMIN — HYDROXYZINE HYDROCHLORIDE 25 MG: 25 TABLET ORAL at 08:02

## 2024-09-27 RX ADMIN — ROPIVACAINE HYDROCHLORIDE: 2 INJECTION, SOLUTION EPIDURAL; INFILTRATION at 02:11

## 2024-09-27 RX ADMIN — IBUPROFEN 600 MG: 600 TABLET, FILM COATED ORAL at 12:32

## 2024-09-27 RX ADMIN — LIDOCAINE HYDROCHLORIDE AND EPINEPHRINE 3 ML: 15; 5 INJECTION, SOLUTION EPIDURAL at 01:55

## 2024-09-27 RX ADMIN — ACETAMINOPHEN 325MG 975 MG: 325 TABLET ORAL at 15:17

## 2024-09-27 RX ADMIN — IBUPROFEN 600 MG: 600 TABLET, FILM COATED ORAL at 17:45

## 2024-09-27 RX ADMIN — OXYTOCIN 4 MILLI-UNITS/MIN: 10 INJECTION INTRAVENOUS at 06:43

## 2024-09-27 RX ADMIN — HYDROCORTISONE 1 APPLICATION: 1 CREAM TOPICAL at 12:32

## 2024-09-27 NOTE — OB LABOR/OXYTOCIN SAFETY PROGRESS
Oxytocin Safety Progress Check Note - Marci Huber 29 y.o. female MRN: [id number]    Unit/Bed#: -01 Encounter: [id number]    Dose (florence-units/min) Oxytocin: 8 florence-units/min  Contraction Frequency (minutes): 2-5  Contraction Intensity: Mild  Uterine Activity Characteristics: Irregular  Cervical Dilation: 6        Cervical Effacement: 80  Fetal Station: -1  Baseline Rate (FHR): 135 bpm  Fetal Heart Rate (FHT): 118 BPM  FHR Category: I             Vital Signs:   Vitals:    24 0922   BP: 106/58   Pulse: 93   Resp:    Temp:    SpO2:      Notes/comments:   Pt reports moderate pressure. FHR Category I. [place] with contractions q3min. SVE as above. Pit running at 8, continue to titrate to contractions. Will recheck in 2 hours or sooner if clinically indicated. Anticipate . Attending physician Dr. Luz Elena colby.     Sriram Chung MD 2024 9:40 AM

## 2024-09-27 NOTE — OB LABOR/OXYTOCIN SAFETY PROGRESS
Labor Progress Note - Marci Huber 29 y.o. female MRN: [id number]    Unit/Bed#: -01 Encounter: [id number]       Contraction Frequency (minutes): 1-2  Contraction Intensity: Mild/Moderate  Uterine Activity Characteristics: Regular  Cervical Dilation: 6        Cervical Effacement: 70  Fetal Station: -1  Baseline Rate (FHR): 145 bpm  Fetal Heart Rate (FHT): 140 BPM  FHR Category: 1               Vital Signs:   Vitals:    09/27/24 0032   BP: 124/75   Pulse: 100   Resp:    Temp:    SpO2:        Notes/comments:     Marci is now 6/70/-1. FHT is cat 1. Plan for epidural and continued expectant management.       Nori Abreu MD 9/27/2024 1:21 AM

## 2024-09-27 NOTE — PLAN OF CARE
Problem: BIRTH - VAGINAL/ SECTION  Goal: Fetal and maternal status remain reassuring during the birth process  Description: INTERVENTIONS:  - Monitor vital signs  - Monitor fetal heart rate  - Monitor uterine activity  - Monitor labor progression (vaginal delivery)  - DVT prophylaxis  - Antibiotic prophylaxis  Outcome: Progressing  Goal: Emotionally satisfying birthing experience for mother/fetus  Description: Interventions:  - Assess, plan, implement and evaluate the nursing care given to the patient in labor  - Advocate the philosophy that each childbirth experience is a unique experience and support the family's chosen level of involvement and control during the labor process   - Actively participate in both the patient's and family's teaching of the birth process  - Consider cultural, Latter day and age-specific factors and plan care for the patient in labor  Outcome: Progressing     Problem: PAIN - ADULT  Goal: Verbalizes/displays adequate comfort level or baseline comfort level  Description: Interventions:  - Encourage patient to monitor pain and request assistance  - Assess pain using appropriate pain scale  - Administer analgesics based on type and severity of pain and evaluate response  - Implement non-pharmacological measures as appropriate and evaluate response  - Consider cultural and social influences on pain and pain management  - Notify physician/advanced practitioner if interventions unsuccessful or patient reports new pain  Outcome: Progressing     Problem: INFECTION - ADULT  Goal: Absence or prevention of progression during hospitalization  Description: INTERVENTIONS:  - Assess and monitor for signs and symptoms of infection  - Monitor lab/diagnostic results  - Monitor all insertion sites, i.e. indwelling lines, tubes, and drains  - Monitor endotracheal if appropriate and nasal secretions for changes in amount and color  - [place] appropriate cooling/warming therapies per order  -  Administer medications as ordered  - Instruct and encourage patient and family to use good hand hygiene technique  - Identify and instruct in appropriate isolation precautions for identified infection/condition  Outcome: Progressing  Goal: Absence of fever/infection during neutropenic period  Description: INTERVENTIONS:  - Monitor WBC    Outcome: Progressing     Problem: SAFETY ADULT  Goal: Patient will remain free of falls  Description: INTERVENTIONS:  - Educate patient/family on patient safety including physical limitations  - Instruct patient to call for assistance with activity   - Consult OT/PT to assist with strengthening/mobility   - Keep Call bell within reach  - Keep bed low and locked with side rails adjusted as appropriate  - Keep care items and personal belongings within reach  - Initiate and maintain comfort rounds  - Make Fall Risk Sign visible to staff  - Offer Toileting every  Hours, in advance of need  - Initiate/Maintain alarm  - Obtain necessary fall risk management equipment:   - Apply yellow socks and bracelet for high fall risk patients  - Consider moving patient to room near nurses station  Outcome: Progressing  Goal: Maintain or return to baseline ADL function  Description: INTERVENTIONS:  -  Assess patient's ability to carry out ADLs; assess patient's baseline for ADL function and identify physical deficits which impact ability to perform ADLs (bathing, care of mouth/teeth, toileting, grooming, dressing, etc.)  - Assess/evaluate cause of self-care deficits   - Assess range of motion  - Assess patient's mobility; develop plan if impaired  - Assess patient's need for assistive devices and provide as appropriate  - Encourage maximum independence but intervene and supervise when necessary  - Involve family in performance of ADLs  - Assess for home care needs following discharge   - Consider OT consult to assist with ADL evaluation and planning for discharge  - Provide patient education as  appropriate  Outcome: Progressing  Goal: Maintains/Returns to pre admission functional level  Description: INTERVENTIONS:  - Perform AM-PAC 6 Click Basic Mobility/ Daily Activity assessment daily.  - Set and communicate daily mobility goal to care team and patient/family/caregiver.   - Collaborate with rehabilitation services on mobility goals if consulted  - Perform Range of Motion  times a day.  - Reposition patient every  hours.  - Dangle patient  times a day  - Stand patient  times a day  - Ambulate patient  times a day  - Out of bed to chair  times a day   - Out of bed for meals  times a day  - Out of bed for toileting  - Record patient progress and toleration of activity level   Outcome: Progressing     Problem: Knowledge Deficit  Goal: Patient/family/caregiver demonstrates understanding of disease process, treatment plan, medications, and discharge instructions  Description: Complete learning assessment and assess knowledge base.  Interventions:  - Provide teaching at level of understanding  - Provide teaching via preferred learning methods  Outcome: Progressing     Problem: DISCHARGE PLANNING  Goal: Discharge to home or other facility with appropriate resources  Description: INTERVENTIONS:  - Identify barriers to discharge w/patient and caregiver  - Arrange for needed discharge resources and transportation as appropriate  - Identify discharge learning needs (meds, wound care, etc.)  - Arrange for interpretive services to assist at discharge as needed  - Refer to Case Management Department for coordinating discharge planning if the patient needs post-hospital services based on physician/advanced practitioner order or complex needs related to functional status, cognitive ability, or social support system  Outcome: Progressing

## 2024-09-27 NOTE — H&P
"H&P Exam - Obstetrics   Marci Huber 29 y.o. female MRN: [id number]  Unit/Bed#: -01 Encounter: [id number]      ASSESSMENT:  29 y.o.yo  at 38w3d weeks gestation who is being admitted for labor .  EFW: 2265 grams - 4 lbs 15 oz (89%) at 32w0d, Cephalic    PLAN:    Diet controlled gestational diabetes mellitus (GDM) in third trimester  Assessment & Plan  No results found for: \"HGBA1C\"    Recent Labs     24  1926   POCGLU 93       Blood Sugar Average: Last 72 hrs:  (P) 93      Macrosomia affecting management of mother, second trimester, not applicable or unspecified fetus  Assessment & Plan  24 at 32w0d  EFW: 2265 grams - 4 lbs 15 oz (89%) at 32w0d    * Uterine contractions  Assessment & Plan  Admit to L&D  CBC, RPR, Type & Screen  SVE: 2.5/60/-3 -> 4/60/-3  FHT: category I  Clinical EFW: 8lbs by Leopold's ; Vertex confirmed by TAUS  GBS status: negative   Postpartum contraception plan: to discuss postpartum  Analgesia at maternal request  Plan: Expectant management      Discussed with Dr. Erickson    This patient will be an INPATIENT  and I certify the anticipated length of stay is >2 Midnights.    History of Present Illness     Chief Complaint: Contractions    HPI:  Marci Huber is a 29 y.o.  female with an SVITLANA of 10/7/2024, by Ultrasound at 38w3d weeks gestation who is being admitted for labor.  Began having intermittent contractions starting at 4pm today with variable frequency ranging between 2-7 minutes apart. Water has not broke. No vaginal bleeding. Active fetal movement. Has chronic low back tightness with no additional acute symptoms or pains.     Contractions: intermittent  Loss of fluid: none  Vaginal bleeding: none  Fetal movement: Frequent movements    She is a [facility] patient.     PREGNANCY COMPLICATIONS:   1) Pregnancy at 38w3d  2) A1GDM  3) Suspected fetal macrosomia  4) Short interval pregnancy    OB History    Para Term  AB Living   2 1 1 0 0 1 "   SAB IAB Ectopic Multiple Live Births   0 0 0 0 1      # Outcome Date GA Lbr Shane/2nd Weight Sex Type Anes PTL Lv   2 Current            1 Term 09/28/22 39w1d / 01:15 3815 g (8 lb 6.6 oz) M Vag-Spont EPI N KALA       Baby complications/comments: none    Review of Systems   Constitutional:  Negative for chills and fever.   HENT:  Negative for ear pain and sore throat.    Eyes:  Negative for pain and visual disturbance.   Respiratory:  Negative for cough and shortness of breath.    Cardiovascular:  Negative for chest pain and palpitations.   Gastrointestinal:  Negative for abdominal pain and vomiting.   Genitourinary:  Negative for dysuria and hematuria.   Musculoskeletal:  Negative for arthralgias and back pain.   Skin:  Negative for color change and rash.   Neurological:  Negative for seizures and syncope.   All other systems reviewed and are negative.      Historical Information   Past Medical History:   Diagnosis Date    39 weeks gestation of pregnancy 06/11/2022    First OB labs - normal Gc/chlamydia - 3/23/22 neg Pap - 6/8/21 neg Blue folder - has Genetic screening - Qnatal neg, MSAFP normal  It's a boy - Sonny    COVID vaccine - yes x 3  Level II normal  28 week labs - CBC, RPR normal. 1hr gtt abnormal, 3hr gtt normal  Received TDAP  Has breast pump Delivery consent previously signed GBS negative   Maintained on aspirin 162mg po daily thru 36 weeks 32 we    Fetal macrosomia in pregnancy in third trimester 09/07/2022    [facility] recommends considering a 39 IOL IOL request sent    Varicella     HAD VACCINES     Past Surgical History:   Procedure Laterality Date    CYST REMOVAL  2016    WISDOM TOOTH EXTRACTION       Social History   Social History     Substance and Sexual Activity   Alcohol Use Not Currently    Alcohol/week: 1.0 standard drink of alcohol    Types: 1 Glasses of wine per week    Comment: socially none with pregnancy     Social History     Substance and Sexual Activity   Drug Use No    Comment: denies  "self, denies FOB, denies family use     Social History     Tobacco Use   Smoking Status Never   Smokeless Tobacco Never     Family History: Family history non-contributory    Meds/Allergies      Medications Prior to Admission:     Blood Glucose Monitoring Suppl (OneTouch Verio Flex System) w/Device KIT    OneTouch Delica Lancets 33G MISC    OneTouch Verio test strip    Prenatal Vit-Fe Fumarate-FA (prenatal multivitamin) 28-0.8 MG TABS   No Known Allergies    OBJECTIVE:    Vitals: Blood pressure 130/74, pulse (!) 114, temperature 98.3 °F (36.8 °C), temperature source Oral, resp. rate 18, height 5' 5\" (1.651 m), weight 92.5 kg (204 lb), last menstrual period 12/22/2023, SpO2 97%, not currently breastfeeding.Body mass index is 33.95 kg/m².    Physical Exam  Constitutional:       General: She is not in acute distress.  HENT:      Head: Normocephalic and atraumatic.      Nose: Nose normal.      Mouth/Throat:      Mouth: Mucous membranes are moist.      Pharynx: Oropharynx is clear.   Eyes:      Conjunctiva/sclera: Conjunctivae normal.      Pupils: Pupils are equal, round, and reactive to light.   Cardiovascular:      Rate and Rhythm: Normal rate and regular rhythm.      Pulses: Normal pulses.   Pulmonary:      Effort: Pulmonary effort is normal.   Abdominal:      Palpations: Abdomen is soft.      Tenderness: There is no abdominal tenderness.   Skin:     General: Skin is warm and dry.   Neurological:      Mental Status: She is alert.       Cervix:   SVE: 4/60/-3    Fetal heart rate:   Baseline Rate (FHR): 130 bpm  Variability: Moderate  Accelerations: 15 x 15 or greater  Decelerations: None  FHR Category: Category I    [place]:   Contraction Frequency (minutes): 2-7  Contraction Duration (seconds):   Contraction Intensity: Mild/Moderate    GBS: negative    Prenatal Labs: Blood Type:   Lab Results   Component Value Date/Time    ABO Grouping B 09/26/2024 09:27 PM     , D (Rh type):   Lab Results   Component Value " "Date/Time    Rh Factor Positive 09/26/2024 09:27 PM     , Antibody Screen: No results found for: \"ANTIBODYSCR\" , HCT/HGB:   Lab Results   Component Value Date/Time    Hematocrit 36.3 09/26/2024 09:27 PM    Hemoglobin 11.4 (L) 09/26/2024 09:27 PM      , MCV:   Lab Results   Component Value Date/Time    MCV 72 (L) 09/26/2024 09:27 PM      , Platelets:   Lab Results   Component Value Date/Time    Platelets 281 09/26/2024 09:27 PM      , 1 hour Glucola:   Lab Results   Component Value Date/Time    Glucose 152 (H) 07/03/2024 08:23 AM   , 3 hour GTT:   Lab Results   Component Value Date/Time    Glucose, GTT - 3 Hour 140 (H) 07/12/2024 10:23 AM   , Varicella: No results found for: \"VARICELLAIGG\"    , Rubella:   Lab Results   Component Value Date/Time    Rubella IgG Quant 33.6 03/25/2024 08:42 AM        , VDRL/RPR:   Lab Results   Component Value Date/Time    RPR Non-Reactive 09/27/2022 03:21 PM      , Hep B:   Lab Results   Component Value Date/Time    Hepatitis B Surface Ag Non-reactive 03/25/2024 08:42 AM     , Hep C: No components found for: \"HEPCSAG\", \"EXTHEPCSAG\"   , HIV:   Lab Results   Component Value Date/Time    HIV-1/HIV-2 Ab Non-Reactive 03/10/2022 03:16 PM     , Chlamydia: No results found for: \"EXTCHLAMYDIA\"  , Gonorrhea:   Lab Results   Component Value Date/Time    N gonorrhoeae, DNA Probe Negative 03/28/2024 12:10 PM     , Group B Strep:    Lab Results   Component Value Date/Time    Strep Grp B PCR Negative 09/11/2024 03:28 PM          Invasive Devices       Peripheral Intravenous Line  Duration             Peripheral IV 09/26/24 Dorsal (posterior);Right Hand <1 day                  Andres Mcmahan MD  PGY-1  9/26/2024  10:58 PM  "

## 2024-09-27 NOTE — OB LABOR/OXYTOCIN SAFETY PROGRESS
Labor Progress Note - Marci Huber 29 y.o. female MRN: [id number]    Unit/Bed#: -01 Encounter: [id number]       Contraction Frequency (minutes): 2 ctx 6 min apart  Contraction Intensity: Moderate/Strong  Uterine Activity Characteristics: Irregular  Cervical Dilation: 6        Cervical Effacement: 70  Fetal Station: -1  Baseline Rate (FHR): 120 bpm  Fetal Heart Rate (FHT): 117 BPM  FHR Category: 1               Vital Signs:   Vitals:    09/27/24 0323   BP: 97/67   Pulse: 96   Resp:    Temp:    SpO2:        Notes/comments:   Pt requested check because she was curious. SVE unchanged as above      Enriqueta Cazares MD 9/27/2024 3:43 AM

## 2024-09-27 NOTE — PLAN OF CARE
Problem: BIRTH - VAGINAL/ SECTION  Goal: Fetal and maternal status remain reassuring during the birth process  Description: INTERVENTIONS:  - Monitor vital signs  - Monitor fetal heart rate  - Monitor uterine activity  - Monitor labor progression (vaginal delivery)  - DVT prophylaxis  - Antibiotic prophylaxis  2024 by Donita Burnette RN  Outcome: Completed  2024 by Donita Burnette RN  Outcome: Progressing  Goal: Emotionally satisfying birthing experience for mother/fetus  Description: Interventions:  - Assess, plan, implement and evaluate the nursing care given to the patient in labor  - Advocate the philosophy that each childbirth experience is a unique experience and support the family's chosen level of involvement and control during the labor process   - Actively participate in both the patient's and family's teaching of the birth process  - Consider cultural, Shinto and age-specific factors and plan care for the patient in labor  2024 by Donita Burnette RN  Outcome: Completed  2024 by Donita Burnette RN  Outcome: Progressing     Problem: POSTPARTUM  Goal: Experiences normal postpartum course  Description: INTERVENTIONS:  - Monitor maternal vital signs  - Assess uterine involution and lochia  Reactivated  Goal: Appropriate maternal -  bonding  Description: INTERVENTIONS:  - Identify family support  - Assess for appropriate maternal/infant bonding   -Encourage maternal/infant bonding opportunities  - Referral to  or  as needed  Reactivated  Goal: Establishment of infant feeding pattern  Description: INTERVENTIONS:  - Assess breast/bottle feeding  - Refer to lactation as needed  Reactivated  Goal: Incision(s), wounds(s) or drain site(s) healing without S/S of infection  Description: INTERVENTIONS  - Assess and document dressing, incision, wound bed, drain sites and surrounding tissue  - Provide patient and family education  -  Perform skin care/dressing changes every   Reactivated     Problem: PAIN - ADULT  Goal: Verbalizes/displays adequate comfort level or baseline comfort level  Description: Interventions:  - Encourage patient to monitor pain and request assistance  - Assess pain using appropriate pain scale  - Administer analgesics based on type and severity of pain and evaluate response  - Implement non-pharmacological measures as appropriate and evaluate response  - Consider cultural and social influences on pain and pain management  - Notify physician/advanced practitioner if interventions unsuccessful or patient reports new pain  9/27/2024 1428 by Donita Burnette RN  Outcome: Progressing  9/27/2024 0744 by Donita Burnette RN  Outcome: Progressing     Problem: INFECTION - ADULT  Goal: Absence or prevention of progression during hospitalization  Description: INTERVENTIONS:  - Assess and monitor for signs and symptoms of infection  - Monitor lab/diagnostic results  - Monitor all insertion sites, i.e. indwelling lines, tubes, and drains  - Monitor endotracheal if appropriate and nasal secretions for changes in amount and color  - [place] appropriate cooling/warming therapies per order  - Administer medications as ordered  - Instruct and encourage patient and family to use good hand hygiene technique  - Identify and instruct in appropriate isolation precautions for identified infection/condition  9/27/2024 1428 by Donita Burnette RN  Outcome: Progressing  9/27/2024 0744 by Donita Burnette RN  Outcome: Progressing  Goal: Absence of fever/infection during neutropenic period  Description: INTERVENTIONS:  - Monitor WBC    9/27/2024 1428 by Donita Burnette RN  Outcome: Progressing  9/27/2024 0744 by Donita Burnette RN  Outcome: Progressing     Problem: SAFETY ADULT  Goal: Patient will remain free of falls  Description: INTERVENTIONS:  - Educate patient/family on patient safety including physical limitations  - Instruct patient to  call for assistance with activity   - Consult OT/PT to assist with strengthening/mobility   - Keep Call bell within reach  - Keep bed low and locked with side rails adjusted as appropriate  - Keep care items and personal belongings within reach  - Initiate and maintain comfort rounds  - Make Fall Risk Sign visible to staff  - Offer Toileting every  Hours, in advance of need  - Initiate/Maintain alarm  - Obtain necessary fall risk management equipment:  - Apply yellow socks and bracelet for high fall risk patients  - Consider moving patient to room near nurses station  9/27/2024 1428 by Donita Burnette RN  Outcome: Progressing  9/27/2024 0744 by Donita Burnette RN  Outcome: Progressing  Goal: Maintain or return to baseline ADL function  Description: INTERVENTIONS:  -  Assess patient's ability to carry out ADLs; assess patient's baseline for ADL function and identify physical deficits which impact ability to perform ADLs (bathing, care of mouth/teeth, toileting, grooming, dressing, etc.)  - Assess/evaluate cause of self-care deficits   - Assess range of motion  - Assess patient's mobility; develop plan if impaired  - Assess patient's need for assistive devices and provide as appropriate  - Encourage maximum independence but intervene and supervise when necessary  - Involve family in performance of ADLs  - Assess for home care needs following discharge   - Consider OT consult to assist with ADL evaluation and planning for discharge  - Provide patient education as appropriate  9/27/2024 1428 by Donita Burnette RN  Outcome: Progressing  9/27/2024 0744 by Donita Burnette RN  Outcome: Progressing  Goal: Maintains/Returns to pre admission functional level  Description: INTERVENTIONS:  - Perform AM-PAC 6 Click Basic Mobility/ Daily Activity assessment daily.  - Set and communicate daily mobility goal to care team and patient/family/caregiver.   - Collaborate with rehabilitation services on mobility goals if consulted  -  Perform Range of Motion  times a day.  - Reposition patient every  hours.  - Dangle patient  times a day  - Stand patient  times a day  - Ambulate patient times a day  - Out of bed to chair  times a day   - Out of bed for meals times a day  - Out of bed for toileting  - Record patient progress and toleration of activity level   9/27/2024 1428 by Donita Burnette RN  Outcome: Progressing  9/27/2024 0744 by Donita Burnette RN  Outcome: Progressing     Problem: Knowledge Deficit  Goal: Patient/family/caregiver demonstrates understanding of disease process, treatment plan, medications, and discharge instructions  Description: Complete learning assessment and assess knowledge base.  Interventions:  - Provide teaching at level of understanding  - Provide teaching via preferred learning methods  9/27/2024 1428 by Donita Burnette RN  Outcome: Progressing  9/27/2024 0744 by Donita Burnette RN  Outcome: Progressing     Problem: DISCHARGE PLANNING  Goal: Discharge to home or other facility with appropriate resources  Description: INTERVENTIONS:  - Identify barriers to discharge w/patient and caregiver  - Arrange for needed discharge resources and transportation as appropriate  - Identify discharge learning needs (meds, wound care, etc.)  - Arrange for interpretive services to assist at discharge as needed  - Refer to Case Management Department for coordinating discharge planning if the patient needs post-hospital services based on physician/advanced practitioner order or complex needs related to functional status, cognitive ability, or social support system  9/27/2024 1428 by Donita Burnette RN  Outcome: Progressing  9/27/2024 0744 by Donita Burnette RN  Outcome: Progressing

## 2024-09-27 NOTE — ANESTHESIA POSTPROCEDURE EVALUATION
Post-Op Assessment Note    CV Status:  Stable  Pain Score: 0    Pain management: satisfactory to patient      Post-op block assessment: catheter intact and no complications   Mental Status:  Awake   Hydration Status:  Stable   PONV Controlled:  None   Airway Patency:  Patent     Post Op Vitals Reviewed: Yes    No anethesia notable event occurred.    Staff: CRNA               /74 (09/27/24 1333)    Temp   98.3F   Pulse (!) 117 (09/27/24 1333)   Resp 18   SpO2 100% RA

## 2024-09-27 NOTE — OB LABOR/OXYTOCIN SAFETY PROGRESS
Labor Progress Note - Marci Huber 29 y.o. female MRN: [id number]    Unit/Bed#: -01 Encounter: [id number]       Contraction Frequency (minutes): 13  Contraction Intensity: Moderate/Strong  Uterine Activity Characteristics: Irregular  Cervical Dilation: 6        Cervical Effacement: 70  Fetal Station: -1  Baseline Rate (FHR): 130 bpm  Fetal Heart Rate (FHT): 117 BPM  FHR Category: 1               Vital Signs:   Vitals:    09/27/24 0521   BP: 102/55   Pulse: 88   Resp: 18   Temp:    SpO2:        Notes/comments:     Marci is comfortable with epidural. FHT is cat 1 but contractions are beginning to space. Will augment with pitocin. D/W Dr. Dre Abreu MD 9/27/2024 5:52 AM

## 2024-09-27 NOTE — OB LABOR/OXYTOCIN SAFETY PROGRESS
Labor Progress Note - Marci Huber 29 y.o. female MRN: [id number]    Unit/Bed#: -01 Encounter: [id number]       Contraction Frequency (minutes): 2-7  Contraction Intensity: Mild/Moderate  Uterine Activity Characteristics: Irregular  Cervical Dilation: 4-5        Cervical Effacement: 60  Fetal Station: -3  Baseline Rate (FHR): 130 bpm  Fetal Heart Rate (FHT): 144 BPM  FHR Category: 1               Vital Signs:   Vitals:    09/26/24 2244   BP:    Pulse:    Resp:    Temp: 98.3 °F (36.8 °C)   SpO2:        Notes/comments:     Marci states her contractions are more intense when standing. On SVE she made minimal change to 4.5/60/-3. FHT is category I. Discussed not augmenting her labor as she is before 39w without medical indications. Discussed the possibility of discharge if her labor course stalls. Patient expressed understanding.       Nori Abreu MD 9/26/2024 10:58 PM

## 2024-09-27 NOTE — PLAN OF CARE
Problem: BIRTH - VAGINAL/ SECTION  Goal: Fetal and maternal status remain reassuring during the birth process  Description: INTERVENTIONS:  - Monitor vital signs  - Monitor fetal heart rate  - Monitor uterine activity  - Monitor labor progression (vaginal delivery)  - DVT prophylaxis  - Antibiotic prophylaxis  Outcome: Progressing  Goal: Emotionally satisfying birthing experience for mother/fetus  Description: Interventions:  - Assess, plan, implement and evaluate the nursing care given to the patient in labor  - Advocate the philosophy that each childbirth experience is a unique experience and support the family's chosen level of involvement and control during the labor process   - Actively participate in both the patient's and family's teaching of the birth process  - Consider cultural, Congregational and age-specific factors and plan care for the patient in labor  Outcome: Progressing     Problem: PAIN - ADULT  Goal: Verbalizes/displays adequate comfort level or baseline comfort level  Description: Interventions:  - Encourage patient to monitor pain and request assistance  - Assess pain using appropriate pain scale  - Administer analgesics based on type and severity of pain and evaluate response  - Implement non-pharmacological measures as appropriate and evaluate response  - Consider cultural and social influences on pain and pain management  - Notify physician/advanced practitioner if interventions unsuccessful or patient reports new pain  Outcome: Progressing     Problem: INFECTION - ADULT  Goal: Absence or prevention of progression during hospitalization  Description: INTERVENTIONS:  - Assess and monitor for signs and symptoms of infection  - Monitor lab/diagnostic results  - Monitor all insertion sites, i.e. indwelling lines, tubes, and drains  - Monitor endotracheal if appropriate and nasal secretions for changes in amount and color  - [place] appropriate cooling/warming therapies per order  -  Administer medications as ordered  - Instruct and encourage patient and family to use good hand hygiene technique  - Identify and instruct in appropriate isolation precautions for identified infection/condition  Outcome: Progressing  Goal: Absence of fever/infection during neutropenic period  Description: INTERVENTIONS:  - Monitor WBC    Outcome: Progressing     Problem: SAFETY ADULT  Goal: Patient will remain free of falls  Description: INTERVENTIONS:  - Educate patient/family on patient safety including physical limitations  - Instruct patient to call for assistance with activity   - Consult OT/PT to assist with strengthening/mobility   - Keep Call bell within reach  - Keep bed low and locked with side rails adjusted as appropriate  - Keep care items and personal belongings within reach  - Initiate and maintain comfort rounds  - Make Fall Risk Sign visible to staff  - Offer Toileting every  Hours, in advance of need  - Initiate/Maintain alarm  - Obtain necessary fall risk management equipment:   - Apply yellow socks and bracelet for high fall risk patients  - Consider moving patient to room near nurses station  Outcome: Progressing  Goal: Maintain or return to baseline ADL function  Description: INTERVENTIONS:  -  Assess patient's ability to carry out ADLs; assess patient's baseline for ADL function and identify physical deficits which impact ability to perform ADLs (bathing, care of mouth/teeth, toileting, grooming, dressing, etc.)  - Assess/evaluate cause of self-care deficits   - Assess range of motion  - Assess patient's mobility; develop plan if impaired  - Assess patient's need for assistive devices and provide as appropriate  - Encourage maximum independence but intervene and supervise when necessary  - Involve family in performance of ADLs  - Assess for home care needs following discharge   - Consider OT consult to assist with ADL evaluation and planning for discharge  - Provide patient education as  appropriate  Outcome: Progressing  Goal: Maintains/Returns to pre admission functional level  Description: INTERVENTIONS:  - Perform AM-PAC 6 Click Basic Mobility/ Daily Activity assessment daily.  - Set and communicate daily mobility goal to care team and patient/family/caregiver.   - Collaborate with rehabilitation services on mobility goals if consulted  - Perform Range of Motion  times a day.  - Reposition patient every  hours.  - Dangle patient times a day  - Stand patient  times a day  - Ambulate patient  times a day  - Out of bed to chair  times a day   - Out of bed for meals  times a day  - Out of bed for toileting  - Record patient progress and toleration of activity level   Outcome: Progressing     Problem: Knowledge Deficit  Goal: Patient/family/caregiver demonstrates understanding of disease process, treatment plan, medications, and discharge instructions  Description: Complete learning assessment and assess knowledge base.  Interventions:  - Provide teaching at level of understanding  - Provide teaching via preferred learning methods  Outcome: Progressing     Problem: DISCHARGE PLANNING  Goal: Discharge to home or other facility with appropriate resources  Description: INTERVENTIONS:  - Identify barriers to discharge w/patient and caregiver  - Arrange for needed discharge resources and transportation as appropriate  - Identify discharge learning needs (meds, wound care, etc.)  - Arrange for interpretive services to assist at discharge as needed  - Refer to Case Management Department for coordinating discharge planning if the patient needs post-hospital services based on physician/advanced practitioner order or complex needs related to functional status, cognitive ability, or social support system  Outcome: Progressing

## 2024-09-27 NOTE — ANESTHESIA PREPROCEDURE EVALUATION
Procedure:  LABOR ANALGESIA    Relevant Problems   GYN   (+) 38 weeks gestation of pregnancy        Physical Exam    Airway    Mallampati score: I  TM Distance: >3 FB  Neck ROM: full     Dental   No notable dental hx     Cardiovascular  Cardiovascular exam normal    Pulmonary  Pulmonary exam normal     Other Findings  post-pubertal.      Anesthesia Plan  ASA Score- 2     Anesthesia Type- epidural with ASA Monitors.         Additional Monitors:     Airway Plan:            Plan Factors-Exercise tolerance (METS): >4 METS.    Chart reviewed.   Existing labs reviewed. Patient summary reviewed.                  Induction-     Postoperative Plan-     Perioperative Resuscitation Plan - Level 1 - Full Code.       Informed Consent- Anesthetic plan and risks discussed with patient.  I personally reviewed this patient with the CRNA. Discussed and agreed on the Anesthesia Plan with the CRNA..

## 2024-09-27 NOTE — ASSESSMENT & PLAN NOTE
Admit to L&D  CBC, RPR, Type & Screen  SVE: 2.5/60/-3 -> 4/60/-3  FHT: category I  Clinical EFW: 8lbs by Leopold's ; Vertex confirmed by TAUS  GBS status: negative   Postpartum contraception plan: to discuss postpartum  Analgesia at maternal request  Plan: Expectant management

## 2024-09-27 NOTE — OB LABOR/OXYTOCIN SAFETY PROGRESS
Oxytocin Safety Progress Check Note - Marci Huber 29 y.o. female MRN: [id number]    Unit/Bed#: -01 Encounter: [id number]    Dose ([place]-units/min) Oxytocin: 10 [place]-units/min  Contraction Frequency (minutes): 2-3  Contraction Intensity: Mild/Moderate  Uterine Activity Characteristics: Regular  Cervical Dilation: 10  Dilation Complete Date: 09/27/24  Dilation Complete Time: 1059  Cervical Effacement: 100  Fetal Station: 1  Baseline Rate (FHR): 130 bpm  Fetal Heart Rate (FHT): 129 BPM  FHR Category: 2               Vital Signs:   Vitals:    09/27/24 1038   BP:    Pulse: 104   Resp:    Temp: 98.3 °F (36.8 °C)   SpO2:        Notes/comments:   Patient feeling more rectal pressure, on exam now completely dilated, fetal heart tracing with intermittent variable decelerations, will start pushing momentarily    Rafaela Chand MD 9/27/2024 11:02 AM

## 2024-09-27 NOTE — ANESTHESIA PROCEDURE NOTES
Epidural Block    Patient location during procedure: OB/L&D  Start time: 9/27/2024 1:53 AM  Reason for block: procedure for pain  Staffing  Performed by: Karly Joseph CRNA  Authorized by: Andrea Cates MD    Preanesthetic Checklist  Completed: patient identified, IV checked, site marked, risks and benefits discussed, surgical consent, monitors and equipment checked, pre-op evaluation and timeout performed  Epidural  Patient position: sitting  Prep: ChloraPrep  Sedation Level: no sedation  Patient monitoring: frequent blood pressure checks, continuous pulse oximetry and heart rate  Approach: midline  Location: lumbar, L3-4  Injection technique: REEMA air  Needle  Needle type: Tuohy   Needle gauge: 18 G  Needle insertion depth: 6 cm  Catheter type: multi-orifice  Catheter size: 20 G  Catheter at skin depth: 12 cm  Catheter securement method: stabilization device and clear occlusive dressing  Test dose: negativelidocaine-epinephrine (XYLOCAINE-MPF/EPINEPHRINE) 1.5 %-1:200,000 injection 3 mL - Epidural   3 mL - 9/27/2024 1:55:00 AM  Assessment  Sensory level: T10  Number of attempts: 1negative aspiration for CSF, negative aspiration for heme and no paresthesia on injection  patient tolerated the procedure well with no immediate complications

## 2024-09-28 VITALS
HEIGHT: 65 IN | RESPIRATION RATE: 18 BRPM | DIASTOLIC BLOOD PRESSURE: 70 MMHG | SYSTOLIC BLOOD PRESSURE: 111 MMHG | WEIGHT: 204 LBS | OXYGEN SATURATION: 98 % | TEMPERATURE: 97.6 F | BODY MASS INDEX: 33.99 KG/M2 | HEART RATE: 93 BPM

## 2024-09-28 PROBLEM — O47.9 UTERINE CONTRACTIONS: Status: RESOLVED | Noted: 2024-09-26 | Resolved: 2024-09-28

## 2024-09-28 PROBLEM — Z3A.38 38 WEEKS GESTATION OF PREGNANCY: Status: RESOLVED | Noted: 2024-02-20 | Resolved: 2024-09-28

## 2024-09-28 PROBLEM — O36.62X0: Status: RESOLVED | Noted: 2024-06-17 | Resolved: 2024-09-28

## 2024-09-28 PROCEDURE — 99024 POSTOP FOLLOW-UP VISIT: CPT | Performed by: OBSTETRICS & GYNECOLOGY

## 2024-09-28 PROCEDURE — NC001 PR NO CHARGE: Performed by: OBSTETRICS & GYNECOLOGY

## 2024-09-28 RX ORDER — SIMETHICONE 80 MG
80 TABLET,CHEWABLE ORAL 4 TIMES DAILY PRN
Qty: 30 TABLET | Refills: 0 | Status: SHIPPED | OUTPATIENT
Start: 2024-09-28

## 2024-09-28 RX ORDER — ACETAMINOPHEN 325 MG/1
975 TABLET ORAL EVERY 6 HOURS
Start: 2024-09-28

## 2024-09-28 RX ORDER — CALCIUM CARBONATE 500 MG/1
1000 TABLET, CHEWABLE ORAL 3 TIMES DAILY PRN
Start: 2024-09-28

## 2024-09-28 RX ORDER — IBUPROFEN 600 MG/1
600 TABLET, FILM COATED ORAL EVERY 6 HOURS
Qty: 30 TABLET | Refills: 0 | Status: SHIPPED | OUTPATIENT
Start: 2024-09-28

## 2024-09-28 RX ORDER — BENZOCAINE/MENTHOL 6 MG-10 MG
1 LOZENGE MUCOUS MEMBRANE DAILY PRN
Start: 2024-09-28

## 2024-09-28 RX ADMIN — IBUPROFEN 600 MG: 600 TABLET, FILM COATED ORAL at 12:33

## 2024-09-28 RX ADMIN — SIMETHICONE 80 MG: 80 TABLET, CHEWABLE ORAL at 06:18

## 2024-09-28 RX ADMIN — ACETAMINOPHEN 325MG 975 MG: 325 TABLET ORAL at 09:54

## 2024-09-28 RX ADMIN — IBUPROFEN 600 MG: 600 TABLET, FILM COATED ORAL at 06:18

## 2024-09-28 NOTE — DISCHARGE SUMMARY
Discharge Summary - OB/GYN  Marci Huber 29 y.o. female MRN: [id number]  Unit/Bed#: -01 Encounter: [id number]    Admission Date: 2024     Discharge Date: 24    Admitting Attending: Clau Laguna MD    Delivering Attending: Dr. Laguna    Discharging Attending: Dr. Majano    Principal Diagnosis: Pregnancy at 38w4d    Secondary Diagnosis, Admission:  A1GDM    Discharge Diagnosis:  Same, delivered  2nd degree perineal laceration, repaired    Procedures: spontaneous vaginal delivery, laceration repair    Anesthesia: epidural    Hospital course:  Ms. Marci Huber presented as a 29 y.o.  at 38w3d with history significant for A1GDM. She presented to labor and delivery with contractions. On initial exam she was noted to be 4/60/-3. Labor was augmented with pitocin titration. Amniotomy was performed for clear fluid. She progressed to complete and began to push.     After pushing for 3 minutes, at 1112 patient delivered a viable male , wt pending, apgars of 8 (1 min) and 9 (5 min). The fetal vertex delivered spontaneously in the left occiput anterior position. There was a loose nuchal cord, which was reduced. The right anterior shoulder delivered atraumatically with maternal expulsive forces and the assistance of gentle downward traction. The left posterior shoulder delivered with maternal expulsive forces and the assistance of gentle upward traction. The remainder of the fetus delivered spontaneously.      The patient sustained a 2nd degree perineal laceration during delivery which was adequately repaired. She tolerated the procedure well.  was transferred to  nursery.     Her post-delivery course was uncomplicated. Mom's blood type is B positive, while baby's, so Rhogam was not indicated.    Her postpartum pain was well controlled with oral analgesics. On day of discharge, she was ambulating and able to reasonably perform all ADLs. She was voiding and had  appropriate bowel function. She was discharged home on postpartum day #1 without complications. She was instructed to follow up with her OB as an outpatient and was given appropriate warnings to call provider if she develops signs of infection or uncontrolled pain.    Complications: none apparent    Condition at discharge: good     Discharge medications and instructions:   Provisions for Follow-Up Care:  See after visit summary    Disposition: Home    Planned Readmission: No    Sriram Chung MD  OBGYN PGY-1  09/29/24  1:18 PM

## 2024-09-28 NOTE — L&D DELIVERY NOTE
Vaginal Delivery Summary - OB/GYN   Marci Huber 29 y.o. female MRN: [id number]  Unit/Bed#: -01 Encounter: [id number]      Pre-delivery Diagnosis:   Pregnancy at 38w3d  A1GDM    Post-delivery Diagnosis:   Same, delivered  2nd degree perineal laceration, repaired    Procedure: Spontaneous Vaginal Delivery, 2nd degree perineal laceration repair    Attending: Dr. Laguna    Assistant(s): Dr. Chung    Anesthesia: Epidural    QBL: 166 mL    Complications: none apparent    Specimens:   1. Arterial and venous cord gases  2. Cord blood  3. Segment of umbilical cord  4. Placenta to storage     Findings:  1. Viable male on 2024 at 1112, with APGARS of 8 and 9 at 1 and 5 minutes respectively  2. Spontaneous delivery of intact placenta at 1117  3. 2 degree laceration repaired with 3-0 Vicryl  4. Blood gases:  Recent Results (from the past 24 hour(s))   Fingerstick Glucose (POCT)    Collection Time: 24 10:36 AM   Result Value Ref Range    POC Glucose 70 65 - 140 mg/dl   CORD, Blood gas, venous    Collection Time: 24 11:18 AM   Result Value Ref Range    pH, Cord Harry 7.446 7.190 - 7.490    pCO2, Cord Harry 32.1 27.0 - 43.0 mm HG    pO2, Cord Harry 27.5 15.0 - 45.0 mm HG    HCO3, Cord Harry 21.6 12.2 - 28.6 mmol/L    Base Exc, Cord Harry -1.4 (L) 1.0 - 9.0 mmol/L    O2 Cont, Cord Harry 15.4 mL/dL    O2 HGB,VENOUS CORD 68.2 %   CORD, Blood gas, arterial    Collection Time: 24 11:18 AM   Result Value Ref Range    pH, Cord Art 7.276 7.230 - 7.430    pCO2, Cord Art 47.8 30.0 - 60.0    pO2, Cord Art 23.2 5.0 - 25.0 mm HG    HCO3, Cord Art 21.8 17.3 - 27.3 mmol/L    Base Exc, Cord Art -5.3 (L) 3.0 - 11.0 mmol/L    O2 Content, Cord Art 11.7 ml/dl    O2 Hgb, Arterial Cord 49.2 %     Disposition:  Patient tolerated the procedure well and was recovering in labor and delivery room     Brief history and labor course:  Ms. Marci Huber presented as a 29 y.o.  at 38w3d with history significant for  A1GDM. She presented to labor and delivery with contractions. On initial exam she was noted to be 4/60/-3. Labor was augmented with pitocin titration. Amniotomy was performed for clear fluid. She progressed to complete and began to push.    Description of procedure:  After pushing for 3 minutes, at 1112 patient delivered a viable male , wt pending, apgars of 8 (1 min) and 9 (5 min). The fetal vertex delivered spontaneously in the left occiput anterior position. There was a loose nuchal cord, which was reduced. The right anterior shoulder delivered atraumatically with maternal expulsive forces and the assistance of gentle downward traction. The left posterior shoulder delivered with maternal expulsive forces and the assistance of gentle upward traction. The remainder of the fetus delivered spontaneously.     Upon delivery, the infant was placed on the mother's abdomen and the cord was clamped and cut after delayed cord clamping. The infant was noted to cry spontaneously and was moving all extremities appropriately. There was no evidence of injury. Awaiting nurse resuscitators evaluated the . Arterial and venous cord blood gases and cord blood was collected for analysis. These were promptly sent to the lab. In the immediate post-partum, 30 units of IV pitocin was administered, and the uterus was noted to contract down well with massage and pitocin. The placenta delivered spontaneously at 1117 and was noted to have a centrally inserted 3 vessel cord.     The vagina, cervix, perineum, and rectum were inspected and there was noted to be a 2nd degree perineal laceration.    Laceration Repair  Patient was comfortable with epidural at this time. The laceration was repaired with 3-0 Vicryl in the standard fashion to reapproximate the laceration. Good hemostasis was confirmed at the conclusion of this procedure. Bimanual exam revealed minimal clots and good uterine tone. The fundus was firm and at the level of  the umbilicus. All needle, sponge, and instrument counts were noted to be correct. Patient tolerated the procedure well and was allowed to recover in the labor and delivery room with family and  before being transferred to the post-partum floor. Dr. Laguna was present and participated in all key portions of the case.    Sriram Chung MD  OBGYN PGY-1  24  10:02 AM

## 2024-09-28 NOTE — ASSESSMENT & PLAN NOTE
Recovering well  Routine postpartum care  Encourage ambulation and familial bonding  Lactation support for breast/bottle feeding  Continue pain management with scheduled Tylenol/Motrin  Postpartum contraceptive plan: vasectomy  DVT prophylaxis: ambulation and SCDs

## 2024-09-28 NOTE — PROGRESS NOTES
Progress Note - OB/GYN  Marci Huber 29 y.o. female MRN: [id number]  Unit/Bed#: -01 Encounter: [id number]    Assessment and Plan     Marci Huber is a patient of: [facility]. She is PPD # 1 s/p  spontaneous vaginal delivery. Recovering well and is stable.       *  (spontaneous vaginal delivery)  Assessment & Plan  Recovering well  Routine postpartum care  Encourage ambulation and familial bonding  Lactation support for breast/bottle feeding  Continue pain management with scheduled Tylenol/Motrin  Postpartum contraceptive plan: vasectomy  DVT prophylaxis: ambulation and SCDs    Diet controlled gestational diabetes mellitus (GDM) in third trimester  Assessment & Plan  Recent Labs     24  1926   POCGLU 93       Blood Sugar Average: Last 72 hrs:  (P) 93      Uterine contractions-resolved as of 2024  Assessment & Plan  Admit to L&D  CBC, RPR, Type & Screen  SVE: 2.5/60/-3 -> 4/60/-3  FHT: category I  Clinical EFW: 8lbs by Leopold's ; Vertex confirmed by TAUS  GBS status: negative   Postpartum contraception plan: to discuss postpartum  Analgesia at maternal request  Plan: Expectant management    Macrosomia affecting management of mother, second trimester, not applicable or unspecified fetus-resolved as of 2024  Assessment & Plan  24 at 32w0d  EFW: 2265 grams - 4 lbs 15 oz (89%) at 32w0d        Disposition   Anticipate discharge home on PPD # 1-2      Subjective/Objective     Chief Complaint: Postpartum State     Subjective:    Marci Huber is PPD#1 s/p  spontaneous vaginal delivery. She has no current complaints. Pain is well controlled. Patient is currently voiding. She is ambulating. Patient is currently passing flatus and has had no bowel movement. She is tolerating PO, and denies nausea or vomitting. Patient denies fever, chills, chest pain, shortness of breath, or calf tenderness. Lochia is normal. She is Bottle feeding. She is recovering well and is  "stable.       Vitals:   /71 (BP Location: Left arm)   Pulse 92   Temp 97.9 °F (36.6 °C) (Oral)   Resp 20   Ht 5' 5\" (1.651 m)   Wt 92.5 kg (204 lb)   LMP 12/22/2023 (Exact Date)   SpO2 98%   Breastfeeding Unknown   BMI 33.95 kg/m²       Intake/Output Summary (Last 24 hours) at 9/28/2024 0714  Last data filed at 9/27/2024 1600  Gross per 24 hour   Intake --   Output 2016 ml   Net -2016 ml       Invasive Devices       None                   Physical Exam:   GEN: Marci Huber appears well, alert and oriented x3, pleasant and cooperative   CARDIO: RRR, no murmurs or rubs  RESP:  CTAB, no wheezes or rales  ABDOMEN: soft, no tenderness, no distention, fundus @ U-2.  EXTREMITIES: SCDs on, non tender, no erythema, b/l Garrett's sign negative.      Labs:     Hemoglobin   Date Value Ref Range Status   09/26/2024 11.4 (L) 11.5 - 15.4 g/dL Final   08/21/2024 10.7 (L) 11.5 - 15.4 g/dL Final     WBC   Date Value Ref Range Status   09/26/2024 11.25 (H) 4.31 - 10.16 Thousand/uL Final   08/21/2024 11.02 (H) 4.31 - 10.16 Thousand/uL Final     Platelets   Date Value Ref Range Status   09/26/2024 281 149 - 390 Thousands/uL Final   08/21/2024 189 149 - 390 Thousands/uL Final          Jose Tadeo DO  9/28/2024  7:14 AM  "

## 2024-09-28 NOTE — DISCHARGE SUMMARY
Discharge Summary - OB/GYN   Marci Huber 29 y.o. female MRN: [id number]  Unit/Bed#: -01 Encounter: [id number]      Admission Date: 2024     Discharge Date: 24    Admitting Diagnosis:     Patient Active Problem List   Diagnosis    Severe obesity due to excess calories affecting pregnancy in third trimester (HCC)     (spontaneous vaginal delivery)    Pregnancy related nausea, antepartum    Short interval between pregnancies affecting pregnancy, antepartum    Diet controlled gestational diabetes mellitus (GDM) in third trimester    Decreased fetal movement    Tachycardia        Discharge Diagnosis:   Same, delivered    Procedures: spontaneous vaginal delivery    Delivery Attending: Clau Laguna MD  Discharge Attending: Dr. Pinto    [facility] Course:     Marci Huber is a 29 y.o.  at 38w4d wks who was initially admitted for labor. She was 4/60/-3 on admission. She progressed to 6/80/-1. She remained unchanged from several hours and was augmented with pitocin. She received an epidural for pain control. An amniotomy was performed for clear fluid. She progressed to complete dilation and began pushing.    She delivered a viable male  on 2024 at 1112. Weight 8 lbs 1.1 oz via spontaneous vaginal delivery. Apgars were 8 (1 min) and 9 (5 min).  was transferred to  nursery. Patient tolerated the procedure well and was transferred to recovery in stable condition.     Her postpartum course was uncomplicated. . Her postpartum pain was well controlled with oral analgesics.    On day of discharge, she was ambulating and able to reasonably perform all ADLs. She was voiding and had appropriate bowel function. Pain was well controlled. She was discharged home on post-partum day #1 without complications. Patient was instructed to follow up with her OB as an outpatient and was given appropriate warnings to call provider if she develops signs of infection or  uncontrolled pain.    Complications: none apparent    Condition at discharge: good     Discharge instructions/Information to patient and family:   - Do not place anything (no partner, tampons or douche) in your vagina for 6 weeks.  -You may walk for exercise for the first 6 weeks then gradually return to your usual activities.   -Please do not drive for 1 week if you have no stitches and for 2 weeks if you have stitches or underwent a  delivery.    -You may take baths or shower per your preference.   -Please look at your bust (breasts) in the mirror daily and call for redness or tenderness or increased warmth.   -Please call us for temperature > 100.4*F or 38* C, worsening pain or a foul discharge.      Discharge Medications:   Prenatal vitamin daily for 6 months or the duration of nursing whichever is longer.  Motrin 600 mg orally every 6 hours as needed for pain  Tylenol (over the counter) per bottle directions as needed for pain: do NOT use with percocet  Hydrocortisone cream 1% (over the counter) applied 1-2x daily to hemorrhoids as needed    Planned Readmission: No      Jose Tadeo DO  24  7:30 AM

## 2024-10-01 DIAGNOSIS — Z13.1 DIABETES MELLITUS SCREENING: Primary | ICD-10-CM

## 2024-10-01 DIAGNOSIS — Z86.32 HISTORY OF GESTATIONAL DIABETES: ICD-10-CM

## 2024-10-02 ENCOUNTER — TELEPHONE (OUTPATIENT)
Age: 29
End: 2024-10-02

## 2024-10-02 NOTE — TELEPHONE ENCOUNTER
Patient delivered on 9/27/24 with Dr. Laguna, no availability for postpartum appointment within timeframe for provider. Contact number on file to assist with scheduling.

## 2024-10-03 LAB — PLACENTA IN STORAGE: NORMAL

## 2024-10-17 ENCOUNTER — POSTPARTUM VISIT (OUTPATIENT)
Dept: OBGYN CLINIC | Facility: CLINIC | Age: 29
End: 2024-10-17

## 2024-10-17 DIAGNOSIS — Z86.32 HISTORY OF DIET CONTROLLED GESTATIONAL DIABETES MELLITUS (GDM): ICD-10-CM

## 2024-10-17 DIAGNOSIS — L73.2 HIDRADENITIS SUPPURATIVA: ICD-10-CM

## 2024-10-17 PROBLEM — E66.01 SEVERE OBESITY DUE TO EXCESS CALORIES AFFECTING PREGNANCY IN THIRD TRIMESTER (HCC): Status: RESOLVED | Noted: 2022-04-18 | Resolved: 2024-10-17

## 2024-10-17 PROBLEM — O99.213 SEVERE OBESITY DUE TO EXCESS CALORIES AFFECTING PREGNANCY IN THIRD TRIMESTER (HCC): Status: RESOLVED | Noted: 2022-04-18 | Resolved: 2024-10-17

## 2024-10-17 PROBLEM — O09.899 SHORT INTERVAL BETWEEN PREGNANCIES AFFECTING PREGNANCY, ANTEPARTUM: Status: RESOLVED | Noted: 2024-03-29 | Resolved: 2024-10-17

## 2024-10-17 PROBLEM — R11.0 PREGNANCY RELATED NAUSEA, ANTEPARTUM: Status: RESOLVED | Noted: 2024-02-20 | Resolved: 2024-10-17

## 2024-10-17 PROBLEM — O26.899 PREGNANCY RELATED NAUSEA, ANTEPARTUM: Status: RESOLVED | Noted: 2024-02-20 | Resolved: 2024-10-17

## 2024-10-17 PROBLEM — O36.8190 DECREASED FETAL MOVEMENT: Status: RESOLVED | Noted: 2024-08-21 | Resolved: 2024-10-17

## 2024-10-17 PROCEDURE — 99024 POSTOP FOLLOW-UP VISIT: CPT | Performed by: STUDENT IN AN ORGANIZED HEALTH CARE EDUCATION/TRAINING PROGRAM

## 2024-10-17 RX ORDER — CLINDAMYCIN PHOSPHATE 10 MG/ML
1 SOLUTION TOPICAL DAILY
Qty: 30 PAD | Refills: 6 | Status: SHIPPED | OUTPATIENT
Start: 2024-10-17 | End: 2025-05-15

## 2024-10-17 NOTE — PROGRESS NOTES
Ambulatory Visit  Name: Marci Huber      : 1995      MRN: [id number]  Encounter Provider: Colleen Méndez DO  Encounter Date: 10/17/2024   Encounter department: [facility] [place]    Assessment & Plan   (spontaneous vaginal delivery)  -doing well post partum   -may return to intercourse 6 weeks post partum, recommend 12-18 month interdelivery interval, plan for  to get vasectomy  -follow up prn/annual          History of diet controlled gestational diabetes mellitus (GDM)  -2hr GTT ordered         Hidradenitis suppurativa  -topical clindamycin to use prn     Orders:    clindamycin (CLEOCIN T) 1 %; Apply 1 Pad topically daily      History of Present Illness     Marci Huber is a 29 y.o. female  s/p  24 presents for post partum visit. Pain well controlled, minimal vaginal bleeding, voiding, having bowel movements, sleeping well, has help at home with baby from , bottle feeding, denies signs of depression or SI/HI.      Review of Systems   Constitutional:  Negative for appetite change, chills, fatigue and fever.   Respiratory:  Negative for cough, chest tightness, shortness of breath and wheezing.    Cardiovascular:  Negative for chest pain, palpitations and leg swelling.   Gastrointestinal:  Negative for abdominal distention, abdominal pain, constipation, diarrhea, nausea and vomiting.   Endocrine: Negative for cold intolerance, heat intolerance and polyuria.   Genitourinary:  Negative for difficulty urinating, dyspareunia, dysuria, genital sores, menstrual problem, vaginal bleeding, vaginal discharge and vaginal pain.   Neurological:  Negative for dizziness, weakness, light-headedness and headaches.           Objective     There were no vitals taken for this visit.    Physical Exam  Constitutional:       General: She is not in acute distress.     Appearance: Normal appearance. She is not ill-appearing.   Cardiovascular:      Rate  and Rhythm: Normal rate.   Pulmonary:      Effort: Pulmonary effort is normal. No respiratory distress.   Abdominal:      General: Abdomen is flat. There is no distension.      Palpations: Abdomen is soft.      Tenderness: There is no abdominal tenderness. There is no guarding or rebound.   Genitourinary:     General: Normal vulva.      Exam position: Lithotomy position.      Labia:         Right: No rash, tenderness, lesion or injury.         Left: No rash, tenderness, lesion or injury.       Vagina: Normal. No foreign body. No vaginal discharge, erythema, tenderness, bleeding or lesions.      Cervix: Normal. No cervical motion tenderness, discharge, friability, lesion, erythema, cervical bleeding or eversion.      Uterus: Normal. Not enlarged, not fixed, not tender and no uterine prolapse.       Adnexa: Right adnexa normal and left adnexa normal.        Right: No mass, tenderness or fullness.          Left: No mass, tenderness or fullness.        Comments: HS exacerbation which is healing throughout groin area   Musculoskeletal:      Right lower leg: No edema.      Left lower leg: No edema.   Neurological:      General: No focal deficit present.      Mental Status: She is alert and oriented to person, place, and time.   Psychiatric:         Mood and Affect: Mood normal.         Behavior: Behavior normal.         Thought Content: Thought content normal.         Judgment: Judgment normal.

## 2024-10-17 NOTE — ASSESSMENT & PLAN NOTE
-topical clindamycin to use prn     Orders:    clindamycin (CLEOCIN T) 1 %; Apply 1 Pad topically daily

## 2024-10-17 NOTE — ASSESSMENT & PLAN NOTE
-doing well post partum   -may return to intercourse 6 weeks post partum, recommend 12-18 month interdelivery interval, plan for  to get vasectomy  -follow up prn/annual

## 2024-11-20 DIAGNOSIS — L73.2 HIDRADENITIS SUPPURATIVA: ICD-10-CM

## 2024-11-21 RX ORDER — CLINDAMYCIN PHOSPHATE 10 MG/ML
1 SOLUTION TOPICAL DAILY
Qty: 30 PAD | Refills: 0 | Status: SHIPPED | OUTPATIENT
Start: 2024-11-21 | End: 2025-06-19